# Patient Record
Sex: FEMALE | Race: WHITE | NOT HISPANIC OR LATINO | Employment: PART TIME | URBAN - METROPOLITAN AREA
[De-identification: names, ages, dates, MRNs, and addresses within clinical notes are randomized per-mention and may not be internally consistent; named-entity substitution may affect disease eponyms.]

---

## 2018-03-06 ENCOUNTER — TELEPHONE (OUTPATIENT)
Dept: FAMILY MEDICINE | Facility: CLINIC | Age: 48
End: 2018-03-06

## 2018-03-06 RX ORDER — MONTELUKAST SODIUM 10 MG/1
5 TABLET ORAL NIGHTLY
COMMUNITY
End: 2018-04-02 | Stop reason: SDUPTHER

## 2018-03-06 RX ORDER — LEVOTHYROXINE SODIUM 25 UG/1
TABLET ORAL
COMMUNITY
Start: 2017-10-20 | End: 2019-04-04

## 2018-03-06 RX ORDER — MONTELUKAST SODIUM 10 MG/1
10 TABLET ORAL NIGHTLY
Qty: 30 TABLET | Refills: 0 | Status: SHIPPED | OUTPATIENT
Start: 2018-03-06 | End: 2018-04-02 | Stop reason: SDUPTHER

## 2018-03-06 RX ORDER — ZOLPIDEM TARTRATE 5 MG/1
5 TABLET ORAL
COMMUNITY
Start: 2017-07-14 | End: 2018-04-11 | Stop reason: SDUPTHER

## 2018-03-06 RX ORDER — MONTELUKAST SODIUM 10 MG/1
10 TABLET ORAL
COMMUNITY
Start: 2017-08-07 | End: 2018-03-06 | Stop reason: SDUPTHER

## 2018-03-06 RX ORDER — AZELASTINE HCL 205.5 UG/1
SPRAY NASAL
COMMUNITY
Start: 2017-07-26 | End: 2018-10-12 | Stop reason: SDUPTHER

## 2018-03-06 RX ORDER — BUPROPION HYDROCHLORIDE 150 MG/1
150 TABLET ORAL
COMMUNITY
Start: 2017-12-26 | End: 2018-03-26 | Stop reason: SDUPTHER

## 2018-03-06 RX ORDER — PAROXETINE HYDROCHLORIDE 40 MG/1
40 TABLET, FILM COATED ORAL
COMMUNITY
Start: 2017-12-06 | End: 2018-03-26 | Stop reason: SDUPTHER

## 2018-03-26 RX ORDER — BUPROPION HYDROCHLORIDE 150 MG/1
TABLET ORAL
Qty: 30 TABLET | Refills: 3 | Status: SHIPPED | OUTPATIENT
Start: 2018-03-26 | End: 2018-07-21 | Stop reason: SDUPTHER

## 2018-03-26 RX ORDER — PAROXETINE HYDROCHLORIDE 40 MG/1
TABLET, FILM COATED ORAL
Qty: 30 TABLET | Refills: 3 | Status: SHIPPED | OUTPATIENT
Start: 2018-03-26 | End: 2018-07-21 | Stop reason: SDUPTHER

## 2018-03-28 ENCOUNTER — TELEPHONE (OUTPATIENT)
Dept: FAMILY MEDICINE | Facility: CLINIC | Age: 48
End: 2018-03-28

## 2018-03-28 NOTE — TELEPHONE ENCOUNTER
Pt calling for refill of her Zolpidem 5mg. Pt can be reached at: [phone]. Pt would like [facility]

## 2018-04-02 RX ORDER — MONTELUKAST SODIUM 10 MG/1
TABLET ORAL
Qty: 30 TABLET | Refills: 0 | Status: SHIPPED | OUTPATIENT
Start: 2018-04-02 | End: 2018-09-01

## 2018-04-11 RX ORDER — ZOLPIDEM TARTRATE 5 MG/1
5 TABLET ORAL NIGHTLY
Qty: 30 TABLET | Refills: 3 | Status: SHIPPED | OUTPATIENT
Start: 2018-04-11 | End: 2018-10-30 | Stop reason: SDUPTHER

## 2018-04-11 NOTE — TELEPHONE ENCOUNTER
Dr Beavers, [facility] shows that this script was filled 2/16/18 for #30 w/ 3 rf, but confirmed with pharmacy that they do not show receiving this script.  Pt last filled 11/2016, can we please resend the refill. Pt has been seen, last seen by you on 5/25/17 ty

## 2018-07-11 ENCOUNTER — TELEPHONE (OUTPATIENT)
Dept: FAMILY MEDICINE | Facility: CLINIC | Age: 48
End: 2018-07-11

## 2018-07-11 NOTE — TELEPHONE ENCOUNTER
Pt LVM, returning Phone call about pharm, Pt needs refill of medication for zolpidem (AMBIEN) 5 mg tablet, [facility] to [facility]

## 2018-07-12 NOTE — TELEPHONE ENCOUNTER
Last refill 4/11 for 30# with 3 refills  Pt should have a refill at her pharmacy  LM asking [facility] to contact [facility]

## 2018-08-03 ENCOUNTER — OFFICE VISIT (OUTPATIENT)
Dept: FAMILY MEDICINE | Facility: CLINIC | Age: 48
End: 2018-08-03
Payer: COMMERCIAL

## 2018-08-03 VITALS
TEMPERATURE: 99.1 F | WEIGHT: 186 LBS | SYSTOLIC BLOOD PRESSURE: 124 MMHG | HEIGHT: 63 IN | HEART RATE: 87 BPM | OXYGEN SATURATION: 98 % | BODY MASS INDEX: 32.96 KG/M2 | DIASTOLIC BLOOD PRESSURE: 84 MMHG

## 2018-08-03 DIAGNOSIS — Z09 HOSPITAL DISCHARGE FOLLOW-UP: Primary | ICD-10-CM

## 2018-08-03 PROCEDURE — 99213 OFFICE O/P EST LOW 20 MIN: CPT | Performed by: NURSE PRACTITIONER

## 2018-08-03 RX ORDER — SUMATRIPTAN SUCCINATE 25 MG/1
25 TABLET ORAL ONCE AS NEEDED
Qty: 9 TABLET | Refills: 0 | Status: SHIPPED | OUTPATIENT
Start: 2018-08-03 | End: 2020-09-04

## 2018-08-03 RX ORDER — CLINDAMYCIN HYDROCHLORIDE 150 MG/1
CAPSULE ORAL
Refills: 0 | COMMUNITY
Start: 2018-06-18 | End: 2019-04-04

## 2018-08-03 RX ORDER — CHLORHEXIDINE GLUCONATE ORAL RINSE 1.2 MG/ML
SOLUTION DENTAL
Refills: 0 | COMMUNITY
Start: 2018-06-18 | End: 2019-04-04

## 2018-08-03 RX ORDER — LEVOTHYROXINE SODIUM 75 UG/1
TABLET ORAL
Refills: 3 | COMMUNITY
Start: 2018-07-21

## 2018-08-03 RX ORDER — OXYCODONE AND ACETAMINOPHEN 5; 325 MG/1; MG/1
TABLET ORAL
Refills: 0 | COMMUNITY
Start: 2018-06-25 | End: 2019-04-04

## 2018-08-03 RX ORDER — IBUPROFEN 600 MG/1
TABLET ORAL
Refills: 0 | COMMUNITY
Start: 2018-06-18 | End: 2019-04-04

## 2018-08-03 ASSESSMENT — ENCOUNTER SYMPTOMS
HEADACHES: 1
FEVER: 1
WHEEZING: 0
CHILLS: 1
COUGH: 0
VOMITING: 0
FATIGUE: 1
NAUSEA: 0
DIARRHEA: 0
CONSTIPATION: 0
SHORTNESS OF BREATH: 0
ABDOMINAL PAIN: 0

## 2018-08-03 ASSESSMENT — PAIN SCALES - GENERAL: PAINLEVEL: 0-NO PAIN

## 2018-08-03 NOTE — PATIENT INSTRUCTIONS
New onset migraine 5d ago, seen at [facility] ER and released. CT negative per papers from the Er. Trial sumatriptan PRN migraine, refer to Dr Xavier.     If Migraine returns, take 1 sumatriptan tablet. If symptoms not resolved in 2 hours, you can take a second tablet. If symptoms still not resolved 2 hours after the second dose, go to the ER for evaluation.     If you have any weakness, difficulty speaking, blurred vision, go to the ER right away for evaluation.

## 2018-08-03 NOTE — LETTER
August 3, 2018     Patient: Annette Montes De Oca   YOB: 1970   Date of Visit: 8/3/2018       To Whom it May Concern:    Annette Montes De Oca was seen in my clinic on 8/3/2018 at 10:20 am. Please excuse Annette NEIL for her absence from work on this day to make the appointment. She may return to work on Wednesday, 8/8/18.    If you have any questions or concerns, please don't hesitate to call.         Sincerely,         CLINTON Delaney        CC: No Recipients

## 2018-08-03 NOTE — PROGRESS NOTES
[facility]  [address] [place] [id number]  [phone]     Reason for visit:   Chief Complaint   Patient presents with   • Post Op Hospital Visit     For migraines- seen at [facility]. [place] disorganized Saturday and [place] started Sunday with multiple vomiting.       HPI   Annette Montes De Oca is a 48 y.o. female who presents with hospital follow up       No past medical history on file.  No past surgical history on file.  Social History     Social History Narrative   • No narrative on file     No family history on file.  Cortisone and Penicillins  Current Outpatient Prescriptions   Medication Sig Dispense Refill   • azelastine 0.15 % (205.5 mcg) nasal spray spray 1 spray by intranasal route 2 times every day in each nostril     • buPROPion XL (WELLBUTRIN XL) 150 mg 24 hr tablet TAKE 1 TABLET BY MOUTH DAILY 30 tablet 1   • ibuprofen (MOTRIN) 600 mg tablet TAKE 1 TABLET BY MOUTH EVERY 4 TO 6 HOURS AS NEEDED FOR PAIN  0   • insulin regular (NovoLIN R) 100 unit/mL injection inject by sliding scale with meals (according to carbohydrate scale)     • levothyroxine (SYNTHROID) 75 mcg tablet 1 TABLET ON AN EMPTY STOMACH IN THE MORNING  3   • montelukast (SINGULAIR) 10 mg tablet TAKE 1 TABLET BY MOUTH NIGHTLY 30 tablet 0   • norgestrel-ethinyl estradiol (CRYSELLE, 28,) 0.3-30 mg-mcg per tablet take 1 tablet by oral route  every day     • oxyCODONE-acetaminophen (PERCOCET) 5-325 mg per tablet TAKE 1 TABLET BY MOUTH EVERY 4 TO 6 HOURS AS NEEDED FOR PAIN  0   • PARoxetine (PAXIL) 40 mg tablet TAKE 1 TABLET BY MOUTH DAILY 30 tablet 1   • zolpidem (AMBIEN) 5 mg tablet Take 1 tablet (5 mg total) by mouth nightly. 30 tablet 3   • chlorhexidine (PERIDEX) 0.12 % solution SWISH FOR 30 SECONDS 1/2 OUNCE TWICE A DAY DO NOT RINSE,DRINK OR EAT FOR 1/2 HOUR  0   • clindamycin (CLEOCIN) 150 mg capsule TAKE ONE CAPSULE BY MOUTH 4 TIMES A DAY UNTIL FINISHED  0   • levothyroxine (SYNTHROID) 25 mcg tablet  take 1 tablet by oral route  every day       No current facility-administered medications for this visit.        Review of Systems   Constitutional: Positive for chills (occasional), fatigue and fever (low grade).   Respiratory: Negative for cough, shortness of breath and wheezing.    Cardiovascular: Negative for chest pain.   Gastrointestinal: Negative for abdominal pain, constipation, diarrhea, nausea and vomiting.   Neurological: Positive for headaches (complex migraine 5d ago).       Objective     Vitals:    08/03/18 1029   BP: 124/84   Pulse: 87   Temp: 37.3 °C (99.1 °F)   SpO2: 98%       Physical Exam   Constitutional: She is oriented to person, place, and time. Vital signs are normal. She appears well-developed and well-nourished.   HENT:   Head: Normocephalic and atraumatic.   Cardiovascular: Normal rate, regular rhythm and normal heart sounds.    Pulmonary/Chest: Effort normal and breath sounds normal.   Neurological: She is alert and oriented to person, place, and time.   Psychiatric: She has a normal mood and affect. Her speech is normal and behavior is normal. Judgment and thought content normal. Cognition and memory are normal.       Recent labs before today:     Lab Results   Component Value Date    WBC 5.6 10/06/2017    HGB 14.3 10/06/2017    HCT 41.9 10/06/2017     (H) 10/06/2017    ALT 11 10/06/2017    AST 10 10/06/2017     10/06/2017    K 4.2 10/06/2017    CL 99 10/06/2017    CREATININE 0.71 10/06/2017    BUN 9 10/06/2017    CO2 23 10/06/2017    TSH 4.920 (H) 10/06/2017    HGBA1C 8.0 (H) 10/06/2017        Procedures   Assessment   [unfilled]  Problem List Items Addressed This Visit     None              CLINTON Delaney  8/3/2018

## 2018-08-29 ENCOUNTER — TELEPHONE (OUTPATIENT)
Dept: FAMILY MEDICINE | Facility: CLINIC | Age: 48
End: 2018-08-29

## 2018-08-29 NOTE — TELEPHONE ENCOUNTER
Pt is scheduled with Dr Shivam greene for DM check up  Pt is a complicated type 1 diabetic and typically sees endo  Per Dr Beavers she will see the pt tonight at 6:40 if she is not seeing nilton, or Friday afternoon per Dr Beavers   Tried to reach pt at both number and LM askingher to call..  If she does not call back will attempt again jc to schedule for friday

## 2018-08-30 NOTE — TELEPHONE ENCOUNTER
Do not see that this pt called back last night after our attempts to reach.  I tried to reach her again today, but had to again leave a detailed message that Dr Beavers her PCP would like to see her jc and we would like to change her appt from Today with Dr Langley to an appt time for jc w/ Dr Beavers, my direct line as well as the  number left for the pt.

## 2018-08-30 NOTE — TELEPHONE ENCOUNTER
I tried to call patient again , stated Nimesh Marc seen she was schd with Shivam today and she would rather see her I have an appt with Nimesh Friday open for her , left message for her to return my call

## 2018-08-31 ENCOUNTER — OFFICE VISIT (OUTPATIENT)
Dept: FAMILY MEDICINE | Facility: CLINIC | Age: 48
End: 2018-08-31
Payer: COMMERCIAL

## 2018-08-31 VITALS
HEART RATE: 94 BPM | DIASTOLIC BLOOD PRESSURE: 60 MMHG | RESPIRATION RATE: 14 BRPM | TEMPERATURE: 99.1 F | HEIGHT: 64 IN | BODY MASS INDEX: 31.76 KG/M2 | SYSTOLIC BLOOD PRESSURE: 128 MMHG | WEIGHT: 186 LBS | OXYGEN SATURATION: 97 %

## 2018-08-31 DIAGNOSIS — L02.211 ABDOMINAL WALL ABSCESS: Primary | ICD-10-CM

## 2018-08-31 DIAGNOSIS — E10.628 TYPE 1 DIABETES MELLITUS WITH OTHER SKIN COMPLICATION: ICD-10-CM

## 2018-08-31 PROCEDURE — 99213 OFFICE O/P EST LOW 20 MIN: CPT | Mod: 25 | Performed by: FAMILY MEDICINE

## 2018-08-31 PROCEDURE — 10061 I&D ABSCESS COMP/MULTIPLE: CPT | Performed by: FAMILY MEDICINE

## 2018-08-31 RX ORDER — CLINDAMYCIN HYDROCHLORIDE 300 MG/1
300 CAPSULE ORAL 3 TIMES DAILY
Qty: 30 CAPSULE | Refills: 0 | Status: SHIPPED | OUTPATIENT
Start: 2018-08-31 | End: 2018-09-07 | Stop reason: SDUPTHER

## 2018-08-31 RX ORDER — CLINDAMYCIN HYDROCHLORIDE 300 MG/1
300 CAPSULE ORAL 3 TIMES DAILY
Qty: 30 CAPSULE | Refills: 0 | Status: SHIPPED | OUTPATIENT
Start: 2018-08-31 | End: 2018-08-31 | Stop reason: SDUPTHER

## 2018-09-01 ASSESSMENT — ENCOUNTER SYMPTOMS
CHEST TIGHTNESS: 0
ACTIVITY CHANGE: 0
FATIGUE: 0
APPETITE CHANGE: 0
ABDOMINAL DISTENTION: 0
FEVER: 1
POLYPHAGIA: 0
BRUISES/BLEEDS EASILY: 0
DIAPHORESIS: 0
DYSURIA: 0

## 2018-09-02 NOTE — PROGRESS NOTES
Subjective      Patient ID: Annette Montes De Oca is a 48 y.o. female.      HPI    The following have been reviewed and updated as appropriate in this visit:     Pt with abscess left lower abd - started few days ago     Likely where she injects insulin     Diabetic with fair control  BG in 200 range   Have not been abnormally out of control for  She does have followup with endo in  A few weeks     She thinks she maybe fever but not sure       Review of Systems   Constitutional: Positive for fever. Negative for activity change, appetite change, diaphoresis and fatigue.   Eyes: Negative for visual disturbance.   Respiratory: Negative for chest tightness.    Cardiovascular: Negative for chest pain.   Gastrointestinal: Negative for abdominal distention.   Endocrine: Negative for polyphagia and polyuria.   Genitourinary: Negative for dysuria.   Hematological: Does not bruise/bleed easily.         Current Outpatient Prescriptions:   •  azelastine 0.15 % (205.5 mcg) nasal spray, spray 1 spray by intranasal route 2 times every day in each nostril, Disp: , Rfl:   •  buPROPion XL (WELLBUTRIN XL) 150 mg 24 hr tablet, TAKE 1 TABLET BY MOUTH DAILY, Disp: 30 tablet, Rfl: 1  •  chlorhexidine (PERIDEX) 0.12 % solution, SWISH FOR 30 SECONDS 1/2 OUNCE TWICE A DAY DO NOT RINSE,DRINK OR EAT FOR 1/2 HOUR, Disp: , Rfl: 0  •  ibuprofen (MOTRIN) 600 mg tablet, TAKE 1 TABLET BY MOUTH EVERY 4 TO 6 HOURS AS NEEDED FOR PAIN, Disp: , Rfl: 0  •  insulin regular (NovoLIN R) 100 unit/mL injection, inject by sliding scale with meals (according to carbohydrate scale), Disp: , Rfl:   •  levothyroxine (SYNTHROID) 25 mcg tablet, take 1 tablet by oral route  every day, Disp: , Rfl:   •  levothyroxine (SYNTHROID) 75 mcg tablet, 1 TABLET ON AN EMPTY STOMACH IN THE MORNING, Disp: , Rfl: 3  •  norgestrel-ethinyl estradiol (CRYSELLE, [place],) 0.3-30 mg-mcg per tablet, take 1 tablet by oral route  every day, Disp: , Rfl:   •  PARoxetine (PAXIL) 40 mg tablet, TAKE 1  "TABLET BY MOUTH DAILY, Disp: 30 tablet, Rfl: 1  •  SUMAtriptan (IMITREX) 25 mg tablet, Take 1 tablet (25 mg total) by mouth once as needed for migraine for up to 1 dose. May repeat dose once in 2 hours if no relief., Disp: 9 tablet, Rfl: 0  •  zolpidem (AMBIEN) 5 mg tablet, Take 1 tablet (5 mg total) by mouth nightly., Disp: 30 tablet, Rfl: 3  •  clindamycin (CLEOCIN HCL) 300 mg capsule, Take 1 capsule (300 mg total) by mouth 3 (three) times a day for 10 days., Disp: 30 capsule, Rfl: 0  •  clindamycin (CLEOCIN) 150 mg capsule, TAKE ONE CAPSULE BY MOUTH 4 TIMES A DAY UNTIL FINISHED, Disp: , Rfl: 0  •  oxyCODONE-acetaminophen (PERCOCET) 5-325 mg per tablet, TAKE 1 TABLET BY MOUTH EVERY 4 TO 6 HOURS AS NEEDED FOR PAIN, Disp: , Rfl: 0    History reviewed. No pertinent past medical history.  Objective     Vitals:    08/31/18 1512   BP: 128/60   BP Location: Right upper arm   Patient Position: Sitting   Pulse: 94   Resp: 14   Temp: 37.3 °C (99.1 °F)   TempSrc: Oral   SpO2: 97%   Weight: 84.4 kg (186 lb)   Height: 1.626 m (5' 4\")     Body mass index is 31.93 kg/m².    Physical Exam   Constitutional: She appears well-developed and well-nourished.   HENT:   Head: Normocephalic and atraumatic.   Neck: Neck supple.   Cardiovascular: Normal rate, regular rhythm, normal heart sounds and intact distal pulses.    Pulmonary/Chest: Effort normal and breath sounds normal.   Abdominal: Soft. Bowel sounds are normal. There is no tenderness. There is no guarding.   Musculoskeletal: She exhibits no edema or deformity.   Skin:   abd wall left lower abd -  Palp abcess with surrounding erythema        Psychiatric: She has a normal mood and affect. Her behavior is normal. Judgment and thought content normal.     Procedures   Incision and Drainage Procedure Note  Diagnosis: abscess  Location: abdomen  Informed Consent: Discussed risks (permanent scarring, light or dark discoloration, infection, pain, bleeding, bruising, redness, damage to " adjacent structures, and recurrence of the lesion) and benefits of the procedure, as well as the alternatives.  Informed consent was obtained.  Preparation: The area was prepared and draped in a standard fashion.  Anesthesia: Lidocaine 2% without epinephrine  Procedure Details: An incision was made overlying the lesion. The lesion drained pus.   A large amount of fluid was drained.     Antibiotic ointment and a sterile pressure dressing were applied. The patient tolerated procedure well.  Total number of lesions drained: 1  Plan: The patient was instructed on post-op care. Recommend OTC analgesia as needed for pain.               Assessment/Plan   Problem List Items Addressed This Visit        Other    Type 1 diabetes mellitus (CMS/[facility]) ([facility])      Other Visit Diagnoses     Abdominal wall abscess    -  Primary    Relevant Orders    Bacterial culture / smear, aerobic and anaerobic      abscess - incised released copious discharge   Wound packed with small amt of of packing with tail     Disc this is a severe infection and may require IV abx   rx clindamycin dis starting immediately with abx take now and another dose in 4-6 hrs before bed tonight  Disc if increasing redness or drainage or fevers she will need to go to ER immediately    Disc monitoring BG closely and cover   Maintain hydration   Disc on Sunday if decreased redness can remove packing - discussed how   followu with me on tuesday

## 2018-09-02 NOTE — PROCEDURES
Procedures   Incision and Drainage Procedure Note  Diagnosis: abscess  Location: abdomen  Informed Consent: Discussed risks (permanent scarring, light or dark discoloration, infection, pain, bleeding, bruising, redness, damage to adjacent structures, and recurrence of the lesion) and benefits of the procedure, as well as the alternatives.  Informed consent was obtained.  Preparation: The area was prepared and draped in a standard fashion.  Anesthesia: Lidocaine 2% without epinephrine  Procedure Details: An incision was made overlying the lesion. The lesion drained pus.   A large amount of fluid was drained.     Antibiotic ointment and a sterile pressure dressing were applied. The patient tolerated procedure well.  Total number of lesions drained: 1  Plan: The patient was instructed on post-op care. Recommend OTC analgesia as needed for pain.

## 2018-09-04 ENCOUNTER — OFFICE VISIT (OUTPATIENT)
Dept: FAMILY MEDICINE | Facility: CLINIC | Age: 48
End: 2018-09-04
Payer: COMMERCIAL

## 2018-09-04 VITALS
TEMPERATURE: 98.4 F | HEIGHT: 64 IN | BODY MASS INDEX: 31.65 KG/M2 | SYSTOLIC BLOOD PRESSURE: 118 MMHG | DIASTOLIC BLOOD PRESSURE: 60 MMHG | OXYGEN SATURATION: 96 % | WEIGHT: 185.4 LBS | RESPIRATION RATE: 14 BRPM | HEART RATE: 91 BPM

## 2018-09-04 DIAGNOSIS — L02.211 ABDOMINAL WALL ABSCESS: ICD-10-CM

## 2018-09-04 DIAGNOSIS — E10.628 TYPE 1 DIABETES MELLITUS WITH OTHER SKIN COMPLICATION: Primary | ICD-10-CM

## 2018-09-04 LAB
BACTERIA SPEC ANAEROBE CULT: ABNORMAL
BACTERIA SPEC CULT: ABNORMAL

## 2018-09-04 PROCEDURE — 99024 POSTOP FOLLOW-UP VISIT: CPT | Performed by: FAMILY MEDICINE

## 2018-09-04 NOTE — PROGRESS NOTES
Subjective      Patient ID: Annette Montes De Oca is a 48 y.o. female.      HPI    The following have been reviewed and updated as appropriate in this visit:          PT with abd wall abcess with I and D - Done 4 days  Ago (Friday)  packing was removed yesterday     She is on day 4 of clindamycin ; still open with some discharge minimal     She did have fevers  Saturday and maybe Sunday but no fever past 2 days ; tolerating clinda without side effects.     She has hx of type 1 DM - has followup with endo set up for next month     BG  occas high was 350 this am  but used ISS  And has come down to < 200     Sept 18th has followup of DM      Review of Systems      Current Outpatient Prescriptions:   •  azelastine 0.15 % (205.5 mcg) nasal spray, spray 1 spray by intranasal route 2 times every day in each nostril, Disp: , Rfl:   •  buPROPion XL (WELLBUTRIN XL) 150 mg 24 hr tablet, TAKE 1 TABLET BY MOUTH DAILY, Disp: 30 tablet, Rfl: 1  •  chlorhexidine (PERIDEX) 0.12 % solution, SWISH FOR 30 SECONDS 1/2 OUNCE TWICE A DAY DO NOT RINSE,DRINK OR EAT FOR 1/2 HOUR, Disp: , Rfl: 0  •  clindamycin (CLEOCIN HCL) 300 mg capsule, Take 1 capsule (300 mg total) by mouth 3 (three) times a day for 10 days., Disp: 30 capsule, Rfl: 0  •  clindamycin (CLEOCIN) 150 mg capsule, TAKE ONE CAPSULE BY MOUTH 4 TIMES A DAY UNTIL FINISHED, Disp: , Rfl: 0  •  ibuprofen (MOTRIN) 600 mg tablet, TAKE 1 TABLET BY MOUTH EVERY 4 TO 6 HOURS AS NEEDED FOR PAIN, Disp: , Rfl: 0  •  insulin regular (NovoLIN R) 100 unit/mL injection, inject by sliding scale with meals (according to carbohydrate scale), Disp: , Rfl:   •  levothyroxine (SYNTHROID) 25 mcg tablet, take 1 tablet by oral route  every day, Disp: , Rfl:   •  levothyroxine (SYNTHROID) 75 mcg tablet, 1 TABLET ON AN EMPTY STOMACH IN THE MORNING, Disp: , Rfl: 3  •  norgestrel-ethinyl estradiol (CRYSELLE, [place],) 0.3-30 mg-mcg per tablet, take 1 tablet by oral route  every day, Disp: , Rfl:   •   "oxyCODONE-acetaminophen (PERCOCET) 5-325 mg per tablet, TAKE 1 TABLET BY MOUTH EVERY 4 TO 6 HOURS AS NEEDED FOR PAIN, Disp: , Rfl: 0  •  PARoxetine (PAXIL) 40 mg tablet, TAKE 1 TABLET BY MOUTH DAILY, Disp: 30 tablet, Rfl: 1  •  SUMAtriptan (IMITREX) 25 mg tablet, Take 1 tablet (25 mg total) by mouth once as needed for migraine for up to 1 dose. May repeat dose once in 2 hours if no relief., Disp: 9 tablet, Rfl: 0  •  zolpidem (AMBIEN) 5 mg tablet, Take 1 tablet (5 mg total) by mouth nightly., Disp: 30 tablet, Rfl: 3    History reviewed. No pertinent past medical history.  Objective     Vitals:    09/04/18 1040   BP: 118/60   BP Location: Right upper arm   Patient Position: Sitting   Pulse: 91   Resp: 14   Temp: 36.9 °C (98.4 °F)   TempSrc: Oral   SpO2: 96%   Weight: 84.1 kg (185 lb 6.4 oz)   Height: 1.626 m (5' 4\")     Body mass index is 31.82 kg/m².    Physical Exam   Constitutional: She appears well-developed and well-nourished.   HENT:   Head: Normocephalic and atraumatic.   Eyes: Conjunctivae and EOM are normal.   Neck: Neck supple.   Cardiovascular: Normal rate, regular rhythm and normal heart sounds.    Pulmonary/Chest: Effort normal.   Abdominal: Soft. Bowel sounds are normal. She exhibits no distension and no mass. There is no tenderness. There is no guarding.   Skin:   abd wall abcess lower left abd wall   I and D site still open some discharge   Not able to express significant discharge       Assessment/Plan   Problem List Items Addressed This Visit        Other    Type 1 diabetes mellitus (CMS/HCC) (HCC) - Primary    Abdominal wall abscess      continue clindamycin   Warm compresses moist   Monitor closely if increase in redness or discharge or pain or fever call or go to ER     Monitor BG closely  And adjust with ISS   followup in 3 day    "

## 2018-09-06 ENCOUNTER — TELEPHONE (OUTPATIENT)
Dept: FAMILY MEDICINE | Facility: CLINIC | Age: 48
End: 2018-09-06

## 2018-09-06 NOTE — TELEPHONE ENCOUNTER
----- Message from Ruba Beavers MD sent at 9/5/2018 12:56 PM EDT -----  Please call pt   Culture results back finally from abd wall abckt showed bacteria is sensitive to clindamycin that she has been prescribed     Continue to take abx  dressing changes twice a day     See me as scheduled on Friday or call if worsening  eg  fever more drainage more redness more fullness  in mean time

## 2018-09-07 ENCOUNTER — OFFICE VISIT (OUTPATIENT)
Dept: FAMILY MEDICINE | Facility: CLINIC | Age: 48
End: 2018-09-07
Payer: COMMERCIAL

## 2018-09-07 VITALS
WEIGHT: 184.2 LBS | RESPIRATION RATE: 14 BRPM | HEIGHT: 64 IN | HEART RATE: 95 BPM | SYSTOLIC BLOOD PRESSURE: 120 MMHG | DIASTOLIC BLOOD PRESSURE: 60 MMHG | OXYGEN SATURATION: 99 % | BODY MASS INDEX: 31.45 KG/M2 | TEMPERATURE: 98.5 F

## 2018-09-07 DIAGNOSIS — L02.211 ABDOMINAL WALL ABSCESS: Primary | ICD-10-CM

## 2018-09-07 PROCEDURE — 99024 POSTOP FOLLOW-UP VISIT: CPT | Performed by: FAMILY MEDICINE

## 2018-09-07 RX ORDER — CLINDAMYCIN HYDROCHLORIDE 300 MG/1
300 CAPSULE ORAL 3 TIMES DAILY
Qty: 9 CAPSULE | Refills: 0 | Status: SHIPPED | OUTPATIENT
Start: 2018-09-07 | End: 2019-04-04

## 2018-09-07 NOTE — PROGRESS NOTES
Subjective      Patient ID: Annette Montes De Oca is a 48 y.o. female.      HPI    The following have been reviewed and updated as appropriate in this visit:     improving   No fever   Less drainage   Tolerating clinda -  No diarrhea no GI upset     BG are better 100-200 range      Review of Systems      Current Outpatient Prescriptions:   •  azelastine 0.15 % (205.5 mcg) nasal spray, spray 1 spray by intranasal route 2 times every day in each nostril, Disp: , Rfl:   •  buPROPion XL (WELLBUTRIN XL) 150 mg 24 hr tablet, TAKE 1 TABLET BY MOUTH DAILY, Disp: 30 tablet, Rfl: 1  •  chlorhexidine (PERIDEX) 0.12 % solution, SWISH FOR 30 SECONDS 1/2 OUNCE TWICE A DAY DO NOT RINSE,DRINK OR EAT FOR 1/2 HOUR, Disp: , Rfl: 0  •  clindamycin (CLEOCIN HCL) 300 mg capsule, Take 1 capsule (300 mg total) by mouth 3 (three) times a day., Disp: 9 capsule, Rfl: 0  •  clindamycin (CLEOCIN) 150 mg capsule, TAKE ONE CAPSULE BY MOUTH 4 TIMES A DAY UNTIL FINISHED, Disp: , Rfl: 0  •  ibuprofen (MOTRIN) 600 mg tablet, TAKE 1 TABLET BY MOUTH EVERY 4 TO 6 HOURS AS NEEDED FOR PAIN, Disp: , Rfl: 0  •  insulin regular (NovoLIN R) 100 unit/mL injection, inject by sliding scale with meals (according to carbohydrate scale), Disp: , Rfl:   •  levothyroxine (SYNTHROID) 25 mcg tablet, take 1 tablet by oral route  every day, Disp: , Rfl:   •  levothyroxine (SYNTHROID) 75 mcg tablet, 1 TABLET ON AN EMPTY STOMACH IN THE MORNING, Disp: , Rfl: 3  •  norgestrel-ethinyl estradiol (CRYSELLE, 28,) 0.3-30 mg-mcg per tablet, take 1 tablet by oral route  every day, Disp: , Rfl:   •  oxyCODONE-acetaminophen (PERCOCET) 5-325 mg per tablet, TAKE 1 TABLET BY MOUTH EVERY 4 TO 6 HOURS AS NEEDED FOR PAIN, Disp: , Rfl: 0  •  PARoxetine (PAXIL) 40 mg tablet, TAKE 1 TABLET BY MOUTH DAILY, Disp: 30 tablet, Rfl: 1  •  SUMAtriptan (IMITREX) 25 mg tablet, Take 1 tablet (25 mg total) by mouth once as needed for migraine for up to 1 dose. May repeat dose once in 2 hours if no relief.,  "Disp: 9 tablet, Rfl: 0  •  zolpidem (AMBIEN) 5 mg tablet, Take 1 tablet (5 mg total) by mouth nightly., Disp: 30 tablet, Rfl: 3    History reviewed. No pertinent past medical history.  Objective     Vitals:    09/07/18 1107   BP: 120/60   BP Location: Right upper arm   Patient Position: Sitting   Pulse: 95   Resp: 14   Temp: 36.9 °C (98.5 °F)   TempSrc: Oral   SpO2: 99%   Weight: 83.6 kg (184 lb 3.2 oz)   Height: 1.626 m (5' 4\")     Body mass index is 31.62 kg/m².    Physical Exam   Constitutional: She is oriented to person, place, and time. She appears well-developed and well-nourished.   HENT:   Head: Normocephalic and atraumatic.   Abdominal: Soft. Bowel sounds are normal. She exhibits no distension.   Neurological: She is alert and oriented to person, place, and time.   Skin:   Left lower abd wall   Less erythema   Less palp abcess   No purulent drainage          Assessment/Plan   Problem List Items Addressed This Visit        Other    Abdominal wall abscess - Primary      resolving abx x 4 more days   Call of not resolving or if increased area of redness or discharge go to er     "

## 2018-09-17 RX ORDER — MONTELUKAST SODIUM 10 MG/1
TABLET ORAL
Qty: 30 TABLET | Refills: 4 | OUTPATIENT
Start: 2018-09-17

## 2018-09-17 NOTE — TELEPHONE ENCOUNTER
Can you please look into this med request? I don't see Singulair in the patients med list. Perhaps it didn't transfer in the conversion. Thank you

## 2018-10-12 ENCOUNTER — TELEPHONE (OUTPATIENT)
Dept: FAMILY MEDICINE | Facility: CLINIC | Age: 48
End: 2018-10-12

## 2018-10-12 RX ORDER — AZELASTINE HCL 205.5 UG/1
SPRAY NASAL
Qty: 30 ML | Refills: 0 | Status: SHIPPED | OUTPATIENT
Start: 2018-10-12 | End: 2019-05-26 | Stop reason: SDUPTHER

## 2018-10-14 RX ORDER — AZELASTINE 1 MG/ML
SPRAY, METERED NASAL
Qty: 30 ML | Refills: 1 | OUTPATIENT
Start: 2018-10-14

## 2018-10-23 ENCOUNTER — OFFICE VISIT (OUTPATIENT)
Dept: FAMILY MEDICINE | Facility: CLINIC | Age: 48
End: 2018-10-23
Payer: COMMERCIAL

## 2018-10-23 DIAGNOSIS — Z23 IMMUNIZATION DUE: Primary | ICD-10-CM

## 2018-10-23 PROCEDURE — 200200 PR NO CHARGE: Performed by: NURSE PRACTITIONER

## 2018-10-23 PROCEDURE — 90471 IMMUNIZATION ADMIN: CPT | Performed by: NURSE PRACTITIONER

## 2018-10-23 PROCEDURE — 90686 IIV4 VACC NO PRSV 0.5 ML IM: CPT | Performed by: NURSE PRACTITIONER

## 2018-10-30 RX ORDER — ZOLPIDEM TARTRATE 5 MG/1
TABLET ORAL
Qty: 30 TABLET | Refills: 0 | Status: SHIPPED | OUTPATIENT
Start: 2018-10-30 | End: 2018-11-30 | Stop reason: SDUPTHER

## 2018-11-30 ENCOUNTER — TELEPHONE (OUTPATIENT)
Dept: FAMILY MEDICINE | Facility: CLINIC | Age: 48
End: 2018-11-30

## 2018-11-30 RX ORDER — PAROXETINE HYDROCHLORIDE 40 MG/1
TABLET, FILM COATED ORAL
Qty: 30 TABLET | Refills: 1 | Status: SHIPPED | OUTPATIENT
Start: 2018-11-30 | End: 2019-01-29 | Stop reason: SDUPTHER

## 2018-11-30 RX ORDER — ZOLPIDEM TARTRATE 5 MG/1
5 TABLET ORAL NIGHTLY PRN
Qty: 30 TABLET | Refills: 3 | Status: SHIPPED | OUTPATIENT
Start: 2018-11-30 | End: 2019-05-14 | Stop reason: SDUPTHER

## 2018-11-30 RX ORDER — ZOLPIDEM TARTRATE 5 MG/1
TABLET ORAL
Qty: 30 TABLET | Refills: 0 | Status: SHIPPED | OUTPATIENT
Start: 2018-11-30 | End: 2018-11-30 | Stop reason: SDUPTHER

## 2018-12-04 ENCOUNTER — PATIENT OUTREACH (OUTPATIENT)
Dept: FAMILY MEDICINE | Facility: CLINIC | Age: 48
End: 2018-12-04

## 2018-12-04 NOTE — PROGRESS NOTES
Care Gap Team has outreached to Annette Montes De Oca on behalf of their primary care provider.      Care Gap Source:: IPPIP Gap Report         Care Gap Status:: Due    Outreach via:: Telephone    Adult Preventive Wellness Protocol(s) Used: : HbA1c Completion, Diabetic Nephropathy Screening    Chart Review Completed:: Yes  Patient interview completed:: No  Inclusion Criteria:: Met  Exclusion Criteria: None  Patient educated on recommended care:: Yes                        I left a message requesting that patient return my call at her earliest convenience to further discuss past due testing.

## 2019-01-02 ENCOUNTER — OFFICE VISIT (OUTPATIENT)
Dept: FAMILY MEDICINE | Facility: CLINIC | Age: 49
End: 2019-01-02
Payer: COMMERCIAL

## 2019-01-02 ENCOUNTER — TELEPHONE (OUTPATIENT)
Dept: FAMILY MEDICINE | Facility: CLINIC | Age: 49
End: 2019-01-02

## 2019-01-02 VITALS
HEIGHT: 63 IN | DIASTOLIC BLOOD PRESSURE: 70 MMHG | HEART RATE: 85 BPM | OXYGEN SATURATION: 98 % | BODY MASS INDEX: 33.06 KG/M2 | WEIGHT: 186.6 LBS | TEMPERATURE: 99.4 F | SYSTOLIC BLOOD PRESSURE: 122 MMHG

## 2019-01-02 DIAGNOSIS — L03.032 PARONYCHIA OF TOE OF LEFT FOOT: Primary | ICD-10-CM

## 2019-01-02 PROCEDURE — 99213 OFFICE O/P EST LOW 20 MIN: CPT | Performed by: FAMILY MEDICINE

## 2019-01-02 RX ORDER — CEPHALEXIN 500 MG/1
500 CAPSULE ORAL 4 TIMES DAILY
Qty: 28 CAPSULE | Refills: 1 | Status: SHIPPED | OUTPATIENT
Start: 2019-01-02 | End: 2019-04-04

## 2019-01-02 NOTE — TELEPHONE ENCOUNTER
Pt's penicillin allergy is GI upset, informed Ritesh at [facility] of this and they will proceed with RF. Pt already informed pharm of this as well.

## 2019-01-02 NOTE — ASSESSMENT & PLAN NOTE
Discussed and reviewed her allergy history: She has GI intolerance to penicillin drugs but no true allergy, denies known allergy to cephalosporins.  Will initiate oral Keflex times 7 days, cautious use of warm foot baths and Epson salt and water, being very careful not to make the water too hot.  Follow-up with podiatrist in 7 days if any problems.    We discussed the expected course of losing that nail over the next 8 weeks: She is instructed to keep it trimmed back, cover with a Band-Aid as needed to avoid irritation of raised nail.

## 2019-01-02 NOTE — TELEPHONE ENCOUNTER
Ritesh from [facility] calling asking if its ok to fill cephalexin due to pcn allergy

## 2019-01-02 NOTE — PATIENT INSTRUCTIONS
Keflex x 7 to 14 days  (call if any GI side effects).    Stay out of work till Saturday or Monday.    Paronychia of toe of left foot  Discussed and reviewed her allergy history: She has GI intolerance to penicillin drugs but no true allergy, denies known allergy to cephalosporins.  Will initiate oral Keflex times 7 days, cautious use of warm foot baths and Epson salt and water, being very careful not to make the water too hot.  Follow-up with podiatrist in 7 days if any problems.    We discussed the expected course of losing that nail over the next 8 weeks: She is instructed to keep it trimmed back, cover with a Band-Aid as needed to avoid irritation of raised nail.

## 2019-01-02 NOTE — PROGRESS NOTES
"Subjective  After wearing a new pair of poorly fitted narrow shoes patient developed partial separation of her left great toenail along with redness swelling and sign of infection with some drainage at the base of the nail which is .    She has a 40-year history of type 1 diabetes but no neuropathy today.  Most recent A1c was above target at 8.0 according to patient.She denies fever chills lymphangitic streaking.  She has been using topical Neosporin for the past few days.  It is uncomfortable to stand on her feet at work at present.     Patient ID: Annette Montes De Oca is a 48 y.o. female.  1970      HPI    The following have been reviewed and updated as appropriate in this visit:  Allergies  Meds  Problems       Review of Systems    Objective     Vitals:    01/02/19 0956   BP: 122/70   BP Location: Right upper arm   Patient Position: Sitting   Pulse: 85   Temp: 37.4 °C (99.4 °F)   SpO2: 98%   Weight: 84.6 kg (186 lb 9.6 oz)   Height: 1.6 m (5' 3\")     Body mass index is 33.05 kg/m².    Physical Exam   Skin:   Left great toe exhibits early paronychia but no fluctuance at medial aspect of the cuticle, the nail has started to separate but remains beneath the cuticle.  No lymphangitic streaking but mild tenderness at the base of nail.    Sensory exam of bilateral feet shows intact vibratory and filament testing.       Assessment/Plan   Problem List Items Addressed This Visit     Paronychia of toe of left foot - Primary     Discussed and reviewed her allergy history: She has GI intolerance to penicillin drugs but no true allergy, denies known allergy to cephalosporins.  Will initiate oral Keflex times 7 days, cautious use of warm foot baths and Epson salt and water, being very careful not to make the water too hot.  Follow-up with podiatrist in 7 days if any problems.    We discussed the expected course of losing that nail over the next 8 weeks: She is instructed to keep it trimmed back, cover with a " Band-Aid as needed to avoid irritation of raised nail.         Relevant Medications    cephalexin (KEFLEX) 500 mg capsule

## 2019-01-02 NOTE — LETTER
January 2, 2019     Patient: Annette Montes De Oca   YOB: 1970   Date of Visit: 1/2/2019       To Whom it May Concern:    Annette Montes De Oca was seen in my clinic on 1/2/2019 at 10:00 am. Please excuse Annette NEIL for her absence from work until Saturday (at the soonest) or Monday if not improved due to  Foot infection treated today    If you have any questions or concerns, please don't hesitate to call.         Sincerely,         Ephraim Patel MD        CC: No Recipients

## 2019-01-11 ENCOUNTER — PATIENT OUTREACH (OUTPATIENT)
Dept: FAMILY MEDICINE | Facility: CLINIC | Age: 49
End: 2019-01-11

## 2019-01-11 NOTE — PROGRESS NOTES
Care Gap Team has outreached to Annette Montes De Oca on behalf of their primary care provider.      Care Gap Source:: IPPIP Gap Report         Care Gap Status:: Due    Outreach via:: Telephone    Adult Preventive Wellness Protocol(s) Used: : HbA1c Completion, Diabetic Nephropathy Screening    Chart Review Completed:: Yes  Patient interview completed:: No  Inclusion Criteria:: Met  Exclusion Criteria: None  Patient educated on recommended care:: Yes       I left a message requesting that patient return my call at her earliest convenience to further discuss past due HbA1c testng and Nephropathy screening.

## 2019-01-29 ENCOUNTER — PATIENT OUTREACH (OUTPATIENT)
Dept: FAMILY MEDICINE | Facility: CLINIC | Age: 49
End: 2019-01-29

## 2019-01-29 NOTE — PROGRESS NOTES
Care Gap Team has outreached to Annette Montes De Oca on behalf of their primary care provider.      Care Gap Source:: [employer] CareBook Report         Care Gap Status:: Due    Outreach via:: Telephone    Adult Preventive Wellness Protocol(s) Used: : HbA1c Completion, Diabetic Nephropathy Screening    Chart Review Completed:: Yes  Patient interview completed:: Yes  Inclusion Criteria:: Met  Exclusion Criteria: None  Patient educated on recommended care:: Yes                 Appointment provided:: No                Addressed patient's questions and concerns.  Patient verbalized an understanding of the plan and is without additional questions or concerns at this time. Patient has lab slips from her endocrinologist and will have them completed soon. She has an appt scheduled for 3/6/19 and her endo is Dr. Vega.

## 2019-03-05 RX ORDER — PAROXETINE HYDROCHLORIDE 40 MG/1
TABLET, FILM COATED ORAL
Qty: 30 TABLET | Refills: 0 | Status: SHIPPED | OUTPATIENT
Start: 2019-03-05 | End: 2019-04-02 | Stop reason: SDUPTHER

## 2019-04-04 ENCOUNTER — OFFICE VISIT (OUTPATIENT)
Dept: FAMILY MEDICINE | Facility: CLINIC | Age: 49
End: 2019-04-04
Payer: COMMERCIAL

## 2019-04-04 VITALS
HEART RATE: 87 BPM | WEIGHT: 185.8 LBS | OXYGEN SATURATION: 99 % | BODY MASS INDEX: 32.92 KG/M2 | HEIGHT: 63 IN | TEMPERATURE: 98.7 F | SYSTOLIC BLOOD PRESSURE: 130 MMHG | RESPIRATION RATE: 20 BRPM | DIASTOLIC BLOOD PRESSURE: 70 MMHG

## 2019-04-04 DIAGNOSIS — R35.0 FREQUENCY OF URINATION: ICD-10-CM

## 2019-04-04 DIAGNOSIS — E10.628 TYPE 1 DIABETES MELLITUS WITH OTHER SKIN COMPLICATION: ICD-10-CM

## 2019-04-04 DIAGNOSIS — N30.00 ACUTE CYSTITIS WITHOUT HEMATURIA: Primary | ICD-10-CM

## 2019-04-04 LAB
BILIRUBIN, POC: NEGATIVE
BLOOD URINE, POC: ABNORMAL
CLARITY, POC: CLEAR
COLOR, POC: YELLOW
EXPIRATION DATE: ABNORMAL
GLUCOSE URINE, POC: NEGATIVE
KETONES, POC: NEGATIVE
LEUKOCYTE EST, POC: ABNORMAL
Lab: ABNORMAL
NITRITE, POC: NEGATIVE
PH, POC: 5
POCT MANUFACTURER: ABNORMAL
PROTEIN, POC: ABNORMAL
SPECIFIC GRAVITY, POC: 1.02
UROBILINOGEN, POC: 0.2

## 2019-04-04 PROCEDURE — 99214 OFFICE O/P EST MOD 30 MIN: CPT | Performed by: FAMILY MEDICINE

## 2019-04-04 PROCEDURE — 81002 URINALYSIS NONAUTO W/O SCOPE: CPT | Performed by: FAMILY MEDICINE

## 2019-04-04 RX ORDER — SULFAMETHOXAZOLE AND TRIMETHOPRIM 800; 160 MG/1; MG/1
1 TABLET ORAL 2 TIMES DAILY
Qty: 20 TABLET | Refills: 0 | Status: SHIPPED | OUTPATIENT
Start: 2019-04-04 | End: 2019-07-19

## 2019-04-04 NOTE — PROGRESS NOTES
Chief Complaint  Chief Complaint   Patient presents with   • Abdominal Pain     left side pain   • UTI       History of Present Illness  Has been under a lot of work and financial stress. Blood sugars have been high and have been variable.   This AM started to notice urine frequency and urgency and a little bladder pressure more to the left and midline in lower pelvis and feels like her UTI in the past and BS higher yet.  No fever, back pain or GI complaints.          Current Outpatient Prescriptions   Medication Sig Dispense Refill   • azelastine 0.15 % (205.5 mcg) nasal spray spray 1 spray by intranasal route 2 times every day in each nostril 30 mL 0   • buPROPion XL (WELLBUTRIN XL) 150 mg 24 hr tablet TAKE 1 TABLET BY MOUTH EVERY DAY 30 tablet 5   • insulin regular (NovoLIN R) 100 unit/mL injection inject by sliding scale with meals (according to carbohydrate scale)     • levothyroxine (SYNTHROID) 75 mcg tablet 1 TABLET ON AN EMPTY STOMACH IN THE MORNING  3   • norgestrel-ethinyl estradiol (CRYSELLE, 28,) 0.3-30 mg-mcg per tablet take 1 tablet by oral route  every day     • PARoxetine (PAXIL) 40 mg tablet TAKE 1 TABLET BY MOUTH EVERY DAY 30 tablet 0   • SUMAtriptan (IMITREX) 25 mg tablet Take 1 tablet (25 mg total) by mouth once as needed for migraine for up to 1 dose. May repeat dose once in 2 hours if no relief. 9 tablet 0   • zolpidem (AMBIEN) 5 mg tablet Take 1 tablet (5 mg total) by mouth nightly as needed for sleep. 30 tablet 3     No current facility-administered medications for this visit.        Patient Active Problem List   Diagnosis   • Anxiety   • Hypothyroid   • Iron deficiency anemia   • Type 1 diabetes mellitus (CMS/HCC) (HCC)   • Abdominal wall abscess   • Paronychia of toe of left foot       Past Medical History:   Diagnosis Date   • Diabetes mellitus type I (CMS/HCC) (HCC)    • Disease of thyroid gland        Past Surgical History:   Procedure Laterality Date   • CARPAL TUNNEL RELEASE     • FOOT  "SURGERY     • HERNIA REPAIR     • MOUTH SURGERY     • OOPHORECTOMY     • SINUS SURGERY     • WISDOM TOOTH EXTRACTION         Family History   Problem Relation Age of Onset   • Heart disease Maternal Grandmother        Social History     Social History   • Marital status:      Spouse name: N/A   • Number of children: N/A   • Years of education: N/A     Occupational History   • Not on file.     Social History Main Topics   • Smoking status: Former Smoker   • Smokeless tobacco: Never Used   • Alcohol use No   • Drug use: No   • Sexual activity: Defer     Other Topics Concern   • Not on file     Social History Narrative    [employer]       Allergies   Allergen Reactions   • Cortisone      inrease in blood sugars, mood changes   • Penicillins GI intolerance       Review of Systems   Constitutional:        As in HPI       Vitals:    04/04/19 0903   BP: 130/70   BP Location: Left upper arm   Patient Position: Sitting   Pulse: 87   Resp: 20   Temp: 37.1 °C (98.7 °F)   TempSrc: Oral   SpO2: 99%   Weight: 84.3 kg (185 lb 12.8 oz)   Height: 1.6 m (5' 3\")     Body mass index is 32.91 kg/m².    Physical Exam   Constitutional: She is oriented to person, place, and time. She appears well-developed and well-nourished.   Eyes: Conjunctivae are normal.   Cardiovascular: Normal rate and regular rhythm.    Pulmonary/Chest: Effort normal and breath sounds normal.   Abdominal: Soft. She exhibits no mass. There is tenderness (Suprapubic and just left of the suprapubic area). There is no rebound and no guarding.   Neurological: She is alert and oriented to person, place, and time.   Skin: No rash noted.   Psychiatric: She has a normal mood and affect. Her behavior is normal.   Nursing note and vitals reviewed.      Problem List Items Addressed This Visit     Type 1 diabetes mellitus (CMS/HCC) ([facility])      Other Visit Diagnoses     Acute cystitis without hematuria    -  Primary    Urine dip with trace leukocytes and blood, start " antibiotic and counseled. ER if worsening left sided pain or fever as atypical diverticulitis a possibility.    Relevant Medications    sulfamethoxazole-trimethoprim (BACTRIM DS) 800-160 mg per tablet    Other Relevant Orders    Urine culture    Urinalysis with microscopic    Frequency of urination        Await culture results and aware to proceed to ER if not responding.  Frequency was disproportionate and urgency disproportionate to when she has high blood suga    Relevant Orders    POCT urinalysis dipstick (Completed)          Return if symptoms worsen or fail to improve.          Jessica Singh MD  [facility]  Family Medicine in [place]  [address],  [address]  [place]  [address]  P: [phone]  F: [phone]

## 2019-04-06 LAB
APPEARANCE UR: CLEAR
BACTERIA #/AREA URNS HPF: ABNORMAL /[HPF]
BILIRUB UR QL STRIP: NEGATIVE
COLOR UR: YELLOW
EPI CELLS #/AREA URNS HPF: ABNORMAL /HPF
GLUCOSE UR QL: NEGATIVE
HGB UR QL STRIP: NEGATIVE
KETONES UR QL STRIP: NEGATIVE
LEUKOCYTE ESTERASE UR QL STRIP: (no result)
MICRO URNS: (no result)
MUCOUS THREADS URNS QL MICRO: PRESENT
NITRITE UR QL STRIP: NEGATIVE
PH UR STRIP: 5.5 [PH] (ref 5–7.5)
PROT UR QL STRIP: NEGATIVE
RBC #/AREA URNS HPF: ABNORMAL /HPF
SP GR UR: 1.02 (ref 1–1.03)
UROBILINOGEN UR STRIP-MCNC: 1 EU/DL (ref 0.2–1)
WBC #/AREA URNS HPF: ABNORMAL /HPF

## 2019-04-07 LAB
BACTERIA UR CULT: NO GROWTH
BACTERIA UR CULT: NORMAL

## 2019-04-25 ENCOUNTER — PATIENT OUTREACH (OUTPATIENT)
Dept: FAMILY MEDICINE | Facility: CLINIC | Age: 49
End: 2019-04-25

## 2019-04-25 NOTE — PROGRESS NOTES
Care Gap Team has outreached to Annette Montes De Oca on behalf of their primary care provider.      Care Gap Source:: IPPIP Gap Report         Care Gap Status:: Due    Outreach via:: Telephone    Adult Preventive Wellness Protocol(s) Used: : HbA1c Completion, Diabetic Nephropathy Screening    Chart Review Completed:: Yes  Patient interview completed:: No  Inclusion Criteria:: Met  Exclusion Criteria: None  Patient educated on recommended care:: No       Provided order(s) for:: none    Provided clinical referral(s) for:: None    Appointment provided:: No        I left a message requesting that patient return my call at her earliest convenience. Per IPPIP Care Gap Report, patient is due for HbA1c and microalbumin screening.

## 2019-05-01 RX ORDER — PAROXETINE HYDROCHLORIDE 40 MG/1
TABLET, FILM COATED ORAL
Qty: 30 TABLET | Refills: 0 | Status: SHIPPED | OUTPATIENT
Start: 2019-05-01 | End: 2019-06-03 | Stop reason: SDUPTHER

## 2019-05-14 RX ORDER — ZOLPIDEM TARTRATE 5 MG/1
5 TABLET ORAL NIGHTLY PRN
Qty: 30 TABLET | Refills: 3 | Status: SHIPPED | OUTPATIENT
Start: 2019-05-14 | End: 2019-09-22 | Stop reason: SDUPTHER

## 2019-05-29 ENCOUNTER — TELEPHONE (OUTPATIENT)
Dept: FAMILY MEDICINE | Facility: CLINIC | Age: 49
End: 2019-05-29

## 2019-05-29 RX ORDER — MONTELUKAST SODIUM 10 MG/1
10 TABLET ORAL EVERY EVENING
Qty: 90 TABLET | Refills: 0 | Status: SHIPPED | OUTPATIENT
Start: 2019-05-29 | End: 2019-09-07 | Stop reason: SDUPTHER

## 2019-05-29 RX ORDER — MONTELUKAST SODIUM 10 MG/1
TABLET ORAL EVERY EVENING
Refills: 4 | COMMUNITY
Start: 2019-04-09 | End: 2019-05-29 | Stop reason: SDUPTHER

## 2019-05-29 RX ORDER — MONTELUKAST SODIUM 10 MG/1
TABLET ORAL EVERY EVENING
Refills: 4 | Status: CANCELLED | OUTPATIENT
Start: 2019-05-29

## 2019-05-29 NOTE — TELEPHONE ENCOUNTER
Medicine Refill Request    Last Office Visit: 4/4/2019  Next Office Visit: 7/15/2019        Current Outpatient Prescriptions:   •  azelastine 0.15 % (205.5 mcg) nasal spray, SPRAY 1 SPRAY IN EACH NOSTRIL 2 TIMES EVERY DAY, Disp: 30 mL, Rfl: 0  •  buPROPion XL (WELLBUTRIN XL) 150 mg 24 hr tablet, TAKE 1 TABLET BY MOUTH EVERY DAY, Disp: 30 tablet, Rfl: 1  •  insulin regular (NovoLIN R) 100 unit/mL injection, inject by sliding scale with meals (according to carbohydrate scale), Disp: , Rfl:   •  levothyroxine (SYNTHROID) 75 mcg tablet, 1 TABLET ON AN EMPTY STOMACH IN THE MORNING, Disp: , Rfl: 3  •  montelukast (SINGULAIR) 10 mg tablet, every evening., Disp: , Rfl: 4  •  norgestrel-ethinyl estradiol (CRYSELLE, 28,) 0.3-30 mg-mcg per tablet, take 1 tablet by oral route  every day, Disp: , Rfl:   •  PARoxetine (PAXIL) 40 mg tablet, TAKE 1 TABLET BY MOUTH EVERY DAY, Disp: 30 tablet, Rfl: 0  •  SUMAtriptan (IMITREX) 25 mg tablet, Take 1 tablet (25 mg total) by mouth once as needed for migraine for up to 1 dose. May repeat dose once in 2 hours if no relief., Disp: 9 tablet, Rfl: 0  •  zolpidem (AMBIEN) 5 mg tablet, Take 1 tablet (5 mg total) by mouth nightly as needed for sleep., Disp: 30 tablet, Rfl: 3      BP Readings from Last 3 Encounters:   04/04/19 130/70   01/02/19 122/70   09/07/18 120/60       Recent Lab results:  No results found for: CHOL, No results found for: HDL, No results found for: LDLCALC, No results found for: TRIG     Lab Results   Component Value Date    GLUCOSE 149 (H) 10/06/2017   ,   Lab Results   Component Value Date    HGBA1C 8.0 (H) 10/06/2017         Lab Results   Component Value Date    CREATININE 0.71 10/06/2017

## 2019-05-29 NOTE — TELEPHONE ENCOUNTER
Pt lvm, returning [facility] phone call about montelukast (SINGULAIR) 10 mg tablet, pt uses it every day,

## 2019-05-29 NOTE — TELEPHONE ENCOUNTER
Left voice mail message for patient to call back to clarify the medication.  At her OV on 8/31/18 she reports that she was no longer taking the medication.

## 2019-07-19 ENCOUNTER — OFFICE VISIT (OUTPATIENT)
Dept: FAMILY MEDICINE | Facility: CLINIC | Age: 49
End: 2019-07-19
Payer: COMMERCIAL

## 2019-07-19 ENCOUNTER — HOSPITAL ENCOUNTER (OUTPATIENT)
Dept: RADIOLOGY | Age: 49
Discharge: HOME | End: 2019-07-19
Attending: FAMILY MEDICINE
Payer: COMMERCIAL

## 2019-07-19 ENCOUNTER — TELEPHONE (OUTPATIENT)
Dept: FAMILY MEDICINE | Facility: CLINIC | Age: 49
End: 2019-07-19

## 2019-07-19 VITALS
RESPIRATION RATE: 16 BRPM | TEMPERATURE: 98.6 F | DIASTOLIC BLOOD PRESSURE: 70 MMHG | BODY MASS INDEX: 34.69 KG/M2 | SYSTOLIC BLOOD PRESSURE: 130 MMHG | HEIGHT: 63 IN | WEIGHT: 195.8 LBS | HEART RATE: 90 BPM | OXYGEN SATURATION: 99 %

## 2019-07-19 DIAGNOSIS — R10.32 ABDOMINAL PAIN, LEFT LOWER QUADRANT: ICD-10-CM

## 2019-07-19 DIAGNOSIS — R30.0 DYSURIA: ICD-10-CM

## 2019-07-19 DIAGNOSIS — E10.628 TYPE 1 DIABETES MELLITUS WITH OTHER SKIN COMPLICATION: ICD-10-CM

## 2019-07-19 DIAGNOSIS — R10.32 ABDOMINAL PAIN, LEFT LOWER QUADRANT: Primary | ICD-10-CM

## 2019-07-19 LAB
BILIRUBIN, POC: NEGATIVE
BLOOD URINE, POC: NEGATIVE
CLARITY, POC: CLEAR
COLOR, POC: YELLOW
GLUCOSE URINE, POC: ABNORMAL
KETONES, POC: NEGATIVE
LEUKOCYTE EST, POC: NEGATIVE
NITRITE, POC: NEGATIVE
PH, POC: 7
PROTEIN, POC: ABNORMAL
SPECIFIC GRAVITY, POC: 1
UROBILINOGEN, POC: 0.2

## 2019-07-19 PROCEDURE — 99214 OFFICE O/P EST MOD 30 MIN: CPT | Performed by: FAMILY MEDICINE

## 2019-07-19 PROCEDURE — 74176 CT ABD & PELVIS W/O CONTRAST: CPT

## 2019-07-19 PROCEDURE — 81002 URINALYSIS NONAUTO W/O SCOPE: CPT | Performed by: FAMILY MEDICINE

## 2019-07-19 RX ORDER — SULFAMETHOXAZOLE AND TRIMETHOPRIM 800; 160 MG/1; MG/1
1 TABLET ORAL 2 TIMES DAILY
Qty: 14 TABLET | Refills: 0 | Status: SHIPPED | OUTPATIENT
Start: 2019-07-19 | End: 2019-08-14

## 2019-07-19 NOTE — TELEPHONE ENCOUNTER
I call ed pt - disc CT results normal  No sign of diverticulitis mass or other infection     Disc will cover for uti with bactrim     If the abd pain persists calll    Do get endo labs and see endo      ( urine culture was sent out )

## 2019-07-19 NOTE — PROGRESS NOTES
Subjective      Patient ID: Annette Montes De Oca is a 49 y.o. female.      HPI    The following have been reviewed and updated as appropriate in this visit:          Pt has had 2 days mild paiin left side of lower abd with midl urinary sx    She had similar sx few most ago saw Dr Singh who rx abx for presumed dx of UTI and sx resolved   But she also did consider possible dx of  Diverticulitis    Pt has not had diverticulitis previously   No fever no chills no diarrhea no BM changes   No vaginal sx   No back pain no flank pain     Pain is left lower abd   No uri sx no focal neuro sx   No SOB no cherst pain   Nno GERD   No bleeding in stool or urine     UA in office + glucose but not blood   DM type 1 fair control   Has apt with endo  Dr Vega     Pain pelvis           Review of Systems      Current Outpatient Prescriptions:   •  azelastine 0.15 % (205.5 mcg) nasal spray, INSTILL 1 SPRAY IN EACH NOSTRIL 2 TIMES EVERY DAY, Disp: 30 mL, Rfl: 0  •  buPROPion XL (WELLBUTRIN XL) 150 mg 24 hr tablet, TAKE 1 TABLET BY MOUTH EVERY DAY, Disp: 30 tablet, Rfl: 0  •  insulin regular (NovoLIN R) 100 unit/mL injection, inject by sliding scale with meals (according to carbohydrate scale), Disp: , Rfl:   •  levothyroxine (SYNTHROID) 75 mcg tablet, 1 TABLET ON AN EMPTY STOMACH IN THE MORNING, Disp: , Rfl: 3  •  montelukast (SINGULAIR) 10 mg tablet, Take 1 tablet (10 mg total) by mouth every evening., Disp: 90 tablet, Rfl: 0  •  norgestrel-ethinyl estradiol (CRYSELLE, 28,) 0.3-30 mg-mcg per tablet, take 1 tablet by oral route  every day, Disp: , Rfl:   •  PARoxetine (PAXIL) 40 mg tablet, TAKE 1 TABLET BY MOUTH EVERY DAY, Disp: 30 tablet, Rfl: 1  •  SUMAtriptan (IMITREX) 25 mg tablet, Take 1 tablet (25 mg total) by mouth once as needed for migraine for up to 1 dose. May repeat dose once in 2 hours if no relief., Disp: 9 tablet, Rfl: 0  •  zolpidem (AMBIEN) 5 mg tablet, Take 1 tablet (5 mg total) by mouth nightly as needed for  "sleep., Disp: 30 tablet, Rfl: 3  •  sulfamethoxazole-trimethoprim (BACTRIM DS) 800-160 mg per tablet, Take 1 tablet by mouth 2 (two) times a day for 7 days., Disp: 14 tablet, Rfl: 0    Past Medical History:   Diagnosis Date   • Diabetes mellitus type I (CMS/HCC) (HCC)    • Disease of thyroid gland      Past Surgical History:   Procedure Laterality Date   • CARPAL TUNNEL RELEASE     • FOOT SURGERY     • HERNIA REPAIR     • MOUTH SURGERY     • OOPHORECTOMY     • SINUS SURGERY     • WISDOM TOOTH EXTRACTION       Objective     Vitals:    07/19/19 1110   BP: 130/70   BP Location: Right upper arm   Patient Position: Sitting   Pulse: 90   Resp: 16   Temp: 37 °C (98.6 °F)   TempSrc: Oral   SpO2: 99%   Weight: 88.8 kg (195 lb 12.8 oz)   Height: 1.6 m (5' 3\")     Body mass index is 34.68 kg/m².    Physical Exam   Constitutional: She is oriented to person, place, and time. She appears well-developed and well-nourished. No distress.   Comfortable appearing   HENT:   Head: Normocephalic and atraumatic.   Neck: Normal range of motion. Neck supple. No thyromegaly present.   Cardiovascular: Normal rate, regular rhythm and normal heart sounds.  Exam reveals no gallop and no friction rub.    No murmur heard.  Pulmonary/Chest: Effort normal and breath sounds normal. No respiratory distress. She has no wheezes. She has no rales.   Abdominal: Soft. Bowel sounds are normal. She exhibits no distension and no mass. There is tenderness. There is no rebound and no guarding.   Just mild tenderness LLQ  No rebound      Musculoskeletal: She exhibits no edema.   Lymphadenopathy:     She has no cervical adenopathy.   Neurological: She is alert and oriented to person, place, and time.   Skin: She is not diaphoretic.   Psychiatric: She has a normal mood and affect. Her behavior is normal. Judgment and thought content normal.   Vitals reviewed.      Assessment/Plan   Problem List Items Addressed This Visit        Nervous    Dysuria    Relevant " Orders    POCT urinalysis dipstick (Completed)    Urine culture (clean catch) (Completed)       Endocrine/Metabolic    Type 1 diabetes mellitus (CMS/HCC) (HCC)    Relevant Orders    Comprehensive metabolic panel    Hemoglobin A1c    Lipid panel    TSH w reflex FT4    Microalbumin/Creatinine Ur Random      Other Visit Diagnoses     Abdominal pain, left lower quadrant    -  Primary    Relevant Orders    CT ABDOMEN PELVIS WITHOUT IV CONTRAST (Completed)      suspected early diverticulitis but CT benign - no bowel wall thickening or inflammation also  Nl pancreas  Etc     After CT dw pt will cover for uti   Will send out urine for clx   May still be early diverticulitis   If sx worsen over weekend go to ER     In long run she will need colonoscopy

## 2019-07-20 LAB
BACTERIA UR CULT: NO GROWTH
BACTERIA UR CULT: NORMAL

## 2019-07-21 ENCOUNTER — TELEPHONE (OUTPATIENT)
Dept: FAMILY MEDICINE | Facility: CLINIC | Age: 49
End: 2019-07-21

## 2019-07-23 ENCOUNTER — TELEPHONE (OUTPATIENT)
Dept: FAMILY MEDICINE | Facility: CLINIC | Age: 49
End: 2019-07-23

## 2019-07-23 LAB
ALBUMIN SERPL-MCNC: 3.8 G/DL (ref 3.5–5.5)
ALBUMIN/CREAT UR: <3.4 MG/G CREAT (ref 0–30)
ALBUMIN/GLOB SERPL: 1.5 {RATIO} (ref 1.2–2.2)
ALP SERPL-CCNC: 120 IU/L (ref 39–117)
ALT SERPL-CCNC: 9 IU/L (ref 0–32)
AST SERPL-CCNC: 9 IU/L (ref 0–40)
BILIRUB SERPL-MCNC: 0.3 MG/DL (ref 0–1.2)
BUN SERPL-MCNC: 9 MG/DL (ref 6–24)
BUN/CREAT SERPL: 10 (ref 9–23)
CALCIUM SERPL-MCNC: 9.5 MG/DL (ref 8.7–10.2)
CHLORIDE SERPL-SCNC: 104 MMOL/L (ref 96–106)
CHOLEST SERPL-MCNC: 150 MG/DL (ref 100–199)
CO2 SERPL-SCNC: 20 MMOL/L (ref 20–29)
CREAT SERPL-MCNC: 0.89 MG/DL (ref 0.57–1)
CREAT UR-MCNC: 88.8 MG/DL
GLOBULIN SER CALC-MCNC: 2.6 G/DL (ref 1.5–4.5)
GLUCOSE SERPL-MCNC: 168 MG/DL (ref 65–99)
HBA1C MFR BLD: 8.3 % (ref 4.8–5.6)
HDLC SERPL-MCNC: 45 MG/DL
LAB CORP EGFR IF AFRICN AM: 87 ML/MIN/1.73
LAB CORP EGFR IF NONAFRICN AM: 76 ML/MIN/1.73
LDLC SERPL CALC-MCNC: 94 MG/DL (ref 0–99)
MICROALBUMIN UR-MCNC: <3 UG/ML
POTASSIUM SERPL-SCNC: 4.5 MMOL/L (ref 3.5–5.2)
PROT SERPL-MCNC: 6.4 G/DL (ref 6–8.5)
SODIUM SERPL-SCNC: 139 MMOL/L (ref 134–144)
T4 FREE SERPL-MCNC: 1.36 NG/DL (ref 0.82–1.77)
TRIGL SERPL-MCNC: 55 MG/DL (ref 0–149)
TSH SERPL DL<=0.005 MIU/L-ACNC: 2.83 UIU/ML (ref 0.45–4.5)
VLDLC SERPL CALC-MCNC: 11 MG/DL (ref 5–40)

## 2019-07-24 NOTE — TELEPHONE ENCOUNTER
Please call pt   See if she is feeling better from left abd pain ? early Diverticulitis vs uti    Let her know she needs to have a colonoscopy both for screening and also because of possible diverticulitis -  It should be at least 2 mos after current  Symptoms have resolved     I also want to have followup with her - I see she is scheduled with Hemal - please get her in with me within the next month  -  I will be seeing pts 8/21 or one of holds next week

## 2019-08-06 RX ORDER — BUPROPION HYDROCHLORIDE 150 MG/1
TABLET ORAL
Qty: 30 TABLET | Refills: 0 | Status: SHIPPED | OUTPATIENT
Start: 2019-08-06 | End: 2019-09-01 | Stop reason: SDUPTHER

## 2019-08-14 ENCOUNTER — OFFICE VISIT (OUTPATIENT)
Dept: FAMILY MEDICINE | Facility: CLINIC | Age: 49
End: 2019-08-14
Payer: COMMERCIAL

## 2019-08-14 VITALS
RESPIRATION RATE: 16 BRPM | SYSTOLIC BLOOD PRESSURE: 130 MMHG | OXYGEN SATURATION: 98 % | HEART RATE: 90 BPM | WEIGHT: 196.6 LBS | HEIGHT: 63 IN | BODY MASS INDEX: 34.84 KG/M2 | TEMPERATURE: 98.9 F | DIASTOLIC BLOOD PRESSURE: 70 MMHG

## 2019-08-14 DIAGNOSIS — E10.9 TYPE 1 DIABETES MELLITUS WITHOUT COMPLICATION (CMS/HCC): Primary | ICD-10-CM

## 2019-08-14 DIAGNOSIS — I25.84 CORONARY ATHEROSCLEROSIS DUE TO CALCIFIED CORONARY LESION OF NATIVE ARTERY: ICD-10-CM

## 2019-08-14 DIAGNOSIS — E03.9 ACQUIRED HYPOTHYROIDISM: ICD-10-CM

## 2019-08-14 DIAGNOSIS — Z12.11 COLON CANCER SCREENING: ICD-10-CM

## 2019-08-14 DIAGNOSIS — I25.10 CORONARY ATHEROSCLEROSIS DUE TO CALCIFIED CORONARY LESION OF NATIVE ARTERY: ICD-10-CM

## 2019-08-14 PROCEDURE — 99214 OFFICE O/P EST MOD 30 MIN: CPT | Performed by: FAMILY MEDICINE

## 2019-08-14 RX ORDER — ATORVASTATIN CALCIUM 10 MG/1
10 TABLET, FILM COATED ORAL DAILY
Qty: 30 TABLET | Refills: 6 | Status: SHIPPED | OUTPATIENT
Start: 2019-08-14 | End: 2020-03-04 | Stop reason: SDUPTHER

## 2019-09-23 RX ORDER — ZOLPIDEM TARTRATE 5 MG/1
5 TABLET ORAL NIGHTLY PRN
Qty: 30 TABLET | Refills: 0 | Status: SHIPPED | OUTPATIENT
Start: 2019-09-23 | End: 2019-10-23 | Stop reason: SDUPTHER

## 2019-10-24 RX ORDER — ZOLPIDEM TARTRATE 5 MG/1
TABLET ORAL
Qty: 30 TABLET | Refills: 0 | Status: SHIPPED | OUTPATIENT
Start: 2019-10-24 | End: 2019-11-29 | Stop reason: SDUPTHER

## 2019-10-31 ENCOUNTER — CLINICAL SUPPORT (OUTPATIENT)
Dept: FAMILY MEDICINE | Facility: CLINIC | Age: 49
End: 2019-10-31
Payer: COMMERCIAL

## 2019-10-31 DIAGNOSIS — Z23 NEED FOR IMMUNIZATION AGAINST INFLUENZA: Primary | ICD-10-CM

## 2019-10-31 PROCEDURE — 90471 IMMUNIZATION ADMIN: CPT | Performed by: FAMILY MEDICINE

## 2019-10-31 PROCEDURE — 90686 IIV4 VACC NO PRSV 0.5 ML IM: CPT | Performed by: FAMILY MEDICINE

## 2019-11-29 RX ORDER — ZOLPIDEM TARTRATE 5 MG/1
TABLET ORAL
Qty: 30 TABLET | Refills: 0 | Status: SHIPPED | OUTPATIENT
Start: 2019-11-29 | End: 2019-12-31 | Stop reason: SDUPTHER

## 2019-11-29 NOTE — TELEPHONE ENCOUNTER
Last r/f 10/24/19    ..  Medicine Refill Request    Last Office Visit: 8/14/2019  Next Office Visit: 12/17/2019        Current Outpatient Medications:   •  atorvastatin (LIPITOR) 10 mg tablet, Take 1 tablet (10 mg total) by mouth daily., Disp: 30 tablet, Rfl: 6  •  azelastine 0.15 % (205.5 mcg) nasal spray, INSTILL 1 SPRAY IN EACH NOSTRIL 2 TIMES EVERY DAY, Disp: 30 mL, Rfl: 0  •  buPROPion XL (WELLBUTRIN XL) 150 mg 24 hr tablet, TAKE 1 TABLET BY MOUTH EVERY DAY, Disp: 30 tablet, Rfl: 5  •  insulin regular (NovoLIN R) 100 unit/mL injection, inject by sliding scale with meals (according to carbohydrate scale), Disp: , Rfl:   •  levothyroxine (SYNTHROID) 75 mcg tablet, 1 TABLET ON AN EMPTY STOMACH IN THE MORNING, Disp: , Rfl: 3  •  montelukast (SINGULAIR) 10 mg tablet, TAKE 1 TABLET BY MOUTH EVERY DAY IN THE EVENING, Disp: 90 tablet, Rfl: 1  •  norgestrel-ethinyl estradiol (CRYSELLE, 28,) 0.3-30 mg-mcg per tablet, take 1 tablet by oral route  every day, Disp: , Rfl:   •  PARoxetine (PAXIL) 40 mg tablet, TAKE 1 TABLET BY MOUTH EVERY DAY, Disp: 30 tablet, Rfl: 3  •  SUMAtriptan (IMITREX) 25 mg tablet, Take 1 tablet (25 mg total) by mouth once as needed for migraine for up to 1 dose. May repeat dose once in 2 hours if no relief., Disp: 9 tablet, Rfl: 0  •  zolpidem (AMBIEN) 5 mg tablet, TAKE 1 TABLET BY MOUTH NIGHTLY AS NEEDED FOR SLEEP, Disp: 30 tablet, Rfl: 0      BP Readings from Last 3 Encounters:   08/14/19 130/70   07/19/19 130/70   04/04/19 130/70       Recent Lab results:  Lab Results   Component Value Date    CHOL 150 07/22/2019   ,   Lab Results   Component Value Date    HDL 45 07/22/2019   ,   Lab Results   Component Value Date    LDLCALC 94 07/22/2019   ,   Lab Results   Component Value Date    TRIG 55 07/22/2019        Lab Results   Component Value Date    GLUCOSE 168 (H) 07/22/2019   ,   Lab Results   Component Value Date    HGBA1C 8.3 (H) 07/22/2019         Lab Results   Component Value Date    CREATININE  0.89 07/22/2019       Lab Results   Component Value Date    TSH 2.830 07/22/2019

## 2019-12-11 ENCOUNTER — OFFICE VISIT (OUTPATIENT)
Dept: FAMILY MEDICINE | Facility: CLINIC | Age: 49
End: 2019-12-11
Payer: COMMERCIAL

## 2019-12-11 VITALS
DIASTOLIC BLOOD PRESSURE: 76 MMHG | OXYGEN SATURATION: 94 % | TEMPERATURE: 97.7 F | BODY MASS INDEX: 34.59 KG/M2 | HEART RATE: 94 BPM | SYSTOLIC BLOOD PRESSURE: 120 MMHG | HEIGHT: 63 IN | WEIGHT: 195.2 LBS | RESPIRATION RATE: 16 BRPM

## 2019-12-11 DIAGNOSIS — H66.002 NON-RECURRENT ACUTE SUPPURATIVE OTITIS MEDIA OF LEFT EAR WITHOUT SPONTANEOUS RUPTURE OF TYMPANIC MEMBRANE: Primary | ICD-10-CM

## 2019-12-11 PROCEDURE — 99213 OFFICE O/P EST LOW 20 MIN: CPT | Performed by: FAMILY MEDICINE

## 2019-12-11 RX ORDER — DOXYCYCLINE 100 MG/1
100 CAPSULE ORAL 2 TIMES DAILY
Qty: 14 CAPSULE | Refills: 0 | Status: SHIPPED | OUTPATIENT
Start: 2019-12-11 | End: 2020-03-04

## 2019-12-11 RX ORDER — HUMAN INSULIN 100 [IU]/ML
INJECTION, SUSPENSION SUBCUTANEOUS
Refills: 12 | COMMUNITY
Start: 2019-12-02 | End: 2020-09-04

## 2019-12-11 ASSESSMENT — ENCOUNTER SYMPTOMS
NECK PAIN: 0
SORE THROAT: 0
ABDOMINAL PAIN: 0
COUGH: 0
DIFFICULTY URINATING: 0
SHORTNESS OF BREATH: 0
VOMITING: 0
TROUBLE SWALLOWING: 0
HEADACHES: 0
LIGHT-HEADEDNESS: 0
PALPITATIONS: 0
ADENOPATHY: 0
WHEEZING: 0
WEAKNESS: 0
DIARRHEA: 0
UNEXPECTED WEIGHT CHANGE: 0
CONSTIPATION: 0
FEVER: 0
FATIGUE: 0
DIZZINESS: 0
ABDOMINAL DISTENTION: 0
BLOOD IN STOOL: 0
RHINORRHEA: 0
NAUSEA: 0

## 2019-12-11 NOTE — PATIENT INSTRUCTIONS
Patient Education     Otitis Media, Adult    Otitis media occurs when there is inflammation and fluid in the middle ear. Your middle ear is a part of the ear that contains bones for hearing as well as air that helps send sounds to your brain.  What are the causes?  This condition is caused by a blockage in the eustachian tube. This tube drains fluid from the ear to the back of the nose (nasopharynx). A blockage in this tube can be caused by an object or by swelling (edema) in the tube. Problems that can cause a blockage include:  · A cold or other upper respiratory infection.  · Allergies.  · An irritant, such as tobacco smoke.  · Enlarged adenoids. The adenoids are areas of soft tissue located high in the back of the throat, behind the nose and the roof of the mouth.  · A mass in the nasopharynx.  · Damage to the ear caused by pressure changes (barotrauma).  What are the signs or symptoms?  Symptoms of this condition include:  · Ear pain.  · A fever.  · Decreased hearing.  · A headache.  · Tiredness (lethargy).  · Fluid leaking from the ear.  · Ringing in the ear.  How is this diagnosed?  This condition is diagnosed with a physical exam. During the exam your health care provider will use an instrument called an otoscope to look into your ear and check for redness, swelling, and fluid. He or she will also ask about your symptoms.  Your health care provider may also order tests, such as:  · A test to check the movement of the eardrum (pneumatic otoscopy). This test is done by squeezing a small amount of air into the ear.  · A test that changes air pressure in the middle ear to check how well the eardrum moves and whether the eustachian tube is working (tympanogram).  How is this treated?  This condition usually goes away on its own within 3-5 days. But if the condition is caused by a bacteria infection and does not go away own its own, or keeps coming back, your health care provider may:  · Prescribe antibiotic  medicines to treat the infection.  · Prescribe or recommend medicines to control pain.  Follow these instructions at home:  · Take over-the-counter and prescription medicines only as told by your health care provider.  · If you were prescribed an antibiotic medicine, take it as told by your health care provider. Do not stop taking the antibiotic even if you start to feel better.  · Keep all follow-up visits as told by your health care provider. This is important.  Contact a health care provider if:  · You have bleeding from your nose.  · There is a lump on your neck.  · You are not getting better in 5 days.  · You feel worse instead of better.  Get help right away if:  · You have severe pain that is not controlled with medicine.  · You have swelling, redness, or pain around your ear.  · You have stiffness in your neck.  · A part of your face is paralyzed.  · The bone behind your ear (mastoid) is tender when you touch it.  · You develop a severe headache.  Summary  · Otitis media is redness, soreness, and swelling of the middle ear.  · This condition usually goes away on its own within 3-5 days.  · If the problem does not go away in 3-5 days, your health care provider may prescribe or recommend medicines to treat your symptoms.  · If you were prescribed an antibiotic medicine, take it as told by your health care provider.  This information is not intended to replace advice given to you by your health care provider. Make sure you discuss any questions you have with your health care provider.  Document Released: 09/22/2005 Document Revised: 12/08/2017 Document Reviewed: 12/08/2017  [employer] Patient Education © 2019 [employer]

## 2019-12-11 NOTE — PROGRESS NOTES
Daily Progress Note       Patient ID: Annette Montes De Oca is a 49 y.o. female.    49 year old female presents for left ear pain.     Earache / Otalgia    There is pain in the left ear. Episode onset: 10 days ago. The problem occurs constantly. The problem has been gradually worsening. There has been no fever. The pain is mild. Associated symptoms include hearing loss. Pertinent negatives include no abdominal pain, coughing, diarrhea, ear discharge, headaches, neck pain, rash, rhinorrhea, sore throat or vomiting. Treatments tried: Azelastine 1 spray/nostril twice daily.       The following have been reviewed and updated as appropriate in this visit:  Allergies  Meds  Problems       Review of Systems   Constitutional: Negative for fatigue, fever and unexpected weight change.   HENT: Positive for ear pain and hearing loss. Negative for ear discharge, rhinorrhea, sore throat and trouble swallowing.    Eyes: Negative for visual disturbance.   Respiratory: Negative for cough, shortness of breath and wheezing.    Cardiovascular: Negative for chest pain, palpitations and leg swelling.   Gastrointestinal: Negative for abdominal distention, abdominal pain, blood in stool, constipation, diarrhea, nausea and vomiting.   Endocrine: Negative for cold intolerance and heat intolerance.   Genitourinary: Negative for difficulty urinating.   Musculoskeletal: Negative for neck pain.   Skin: Negative for rash.   Neurological: Negative for dizziness, syncope, weakness, light-headedness and headaches.   Hematological: Negative for adenopathy.             Current Outpatient Medications   Medication Sig Dispense Refill   • atorvastatin (LIPITOR) 10 mg tablet Take 1 tablet (10 mg total) by mouth daily. 30 tablet 6   • azelastine 0.15 % (205.5 mcg) nasal spray INSTILL 1 SPRAY IN EACH NOSTRIL 2 TIMES EVERY DAY 30 mL 0   • buPROPion XL (WELLBUTRIN XL) 150 mg 24 hr tablet TAKE 1 TABLET BY MOUTH EVERY DAY 30 tablet 5   • insulin regular (NovoLIN  R) 100 unit/mL injection inject by sliding scale with meals (according to carbohydrate scale)     • levothyroxine (SYNTHROID) 75 mcg tablet 1 TABLET ON AN EMPTY STOMACH IN THE MORNING  3   • montelukast (SINGULAIR) 10 mg tablet TAKE 1 TABLET BY MOUTH EVERY DAY IN THE EVENING 90 tablet 1   • norgestrel-ethinyl estradiol (CRYSELLE, 28,) 0.3-30 mg-mcg per tablet take 1 tablet by oral route  every day     • NOVOLIN N NPH U-100 INSULIN 100 unit/mL injection INJECT 40 UNITS SUBCUTANEOUSLY AT BEDTIME AS DIRECTED  12   • PARoxetine (PAXIL) 40 mg tablet TAKE 1 TABLET BY MOUTH EVERY DAY 30 tablet 3   • SUMAtriptan (IMITREX) 25 mg tablet Take 1 tablet (25 mg total) by mouth once as needed for migraine for up to 1 dose. May repeat dose once in 2 hours if no relief. 9 tablet 0   • zolpidem (AMBIEN) 5 mg tablet TAKE 1 TABLET BY MOUTH EVERY DAY AT BEDTIME AS NEEDED FOR SLEEP 30 tablet 0   • doxycycline hyclate (VIBRAMYCIN) 100 mg capsule Take 1 capsule (100 mg total) by mouth 2 (two) times a day for 7 days. 14 capsule 0     No current facility-administered medications for this visit.      Past Medical History:   Diagnosis Date   • Diabetes mellitus type I (CMS/HCC)    • Disease of thyroid gland      Family History   Problem Relation Age of Onset   • Heart disease Maternal Grandmother      Past Surgical History:   Procedure Laterality Date   • CARPAL TUNNEL RELEASE     • FOOT SURGERY     • HERNIA REPAIR     • MOUTH SURGERY     • OOPHORECTOMY     • SINUS SURGERY     • WISDOM TOOTH EXTRACTION       Social History     Tobacco Use   • Smoking status: Former Smoker   • Smokeless tobacco: Never Used   Substance Use Topics   • Alcohol use: No   • Drug use: No     Allergies   Allergen Reactions   • Cortisone      inrease in blood sugars, mood changes   • Penicillins GI intolerance       Objective   No new labs.    Vitals:    12/11/19 1531   BP: 120/76   BP Location: Left upper arm   Patient Position: Sitting   Pulse: 94   Resp: 16   Temp:  "36.5 °C (97.7 °F)   TempSrc: Oral   SpO2: 94%   Weight: 88.5 kg (195 lb 3.2 oz)   Height: 1.6 m (5' 2.99\")         Physical Exam   Constitutional: She is oriented to person, place, and time. She appears well-developed and well-nourished.   HENT:   Head: Normocephalic and atraumatic.   Left Ear: Tympanic membrane is erythematous.   Eyes: Pupils are equal, round, and reactive to light. Conjunctivae and EOM are normal.   Neck: Normal range of motion. Neck supple.   Cardiovascular: Normal rate, regular rhythm and normal heart sounds.   Pulmonary/Chest: Effort normal and breath sounds normal. No respiratory distress.   Abdominal: Soft. Bowel sounds are normal. She exhibits no distension. There is no tenderness.   Musculoskeletal: Normal range of motion. She exhibits no edema.   Neurological: She is alert and oriented to person, place, and time.   Skin: Skin is warm and dry.   Nursing note and vitals reviewed.      Assessment/Plan   Problem List Items Addressed This Visit     None      Visit Diagnoses     Non-recurrent acute suppurative otitis media of left ear without spontaneous rupture of tympanic membrane    -  Primary    Relevant Medications    doxycycline hyclate (VIBRAMYCIN) 100 mg capsule      Will initiate abx at this time - started on doxycycline 100 mg twice daily x 7 days. May take NSAIDs or acetaminophen as needed for pain. Advised patient to call back if symptoms persists or worsens.     Chetan Rausch MD  12/11/2019  "

## 2020-01-06 RX ORDER — PAROXETINE HYDROCHLORIDE 40 MG/1
TABLET, FILM COATED ORAL
Qty: 30 TABLET | Refills: 0 | Status: SHIPPED | OUTPATIENT
Start: 2020-01-06 | End: 2020-02-29

## 2020-01-06 NOTE — TELEPHONE ENCOUNTER
Please call and schedule appt  Pt was due 12/14/19      ..  Medicine Refill Request    Last Office Visit: 12/11/2019  Next Office Visit: Visit date not found        Current Outpatient Medications:   •  atorvastatin (LIPITOR) 10 mg tablet, Take 1 tablet (10 mg total) by mouth daily., Disp: 30 tablet, Rfl: 6  •  azelastine 0.15 % (205.5 mcg) nasal spray, INSTILL 1 SPRAY IN EACH NOSTRIL 2 TIMES EVERY DAY, Disp: 30 mL, Rfl: 0  •  buPROPion XL (WELLBUTRIN XL) 150 mg 24 hr tablet, TAKE 1 TABLET BY MOUTH EVERY DAY, Disp: 30 tablet, Rfl: 5  •  insulin regular (NovoLIN R) 100 unit/mL injection, inject by sliding scale with meals (according to carbohydrate scale), Disp: , Rfl:   •  levothyroxine (SYNTHROID) 75 mcg tablet, 1 TABLET ON AN EMPTY STOMACH IN THE MORNING, Disp: , Rfl: 3  •  montelukast (SINGULAIR) 10 mg tablet, TAKE 1 TABLET BY MOUTH EVERY DAY IN THE EVENING, Disp: 90 tablet, Rfl: 1  •  norgestrel-ethinyl estradiol (CRYSELLE, 28,) 0.3-30 mg-mcg per tablet, take 1 tablet by oral route  every day, Disp: , Rfl:   •  NOVOLIN N NPH U-100 INSULIN 100 unit/mL injection, INJECT 40 UNITS SUBCUTANEOUSLY AT BEDTIME AS DIRECTED, Disp: , Rfl: 12  •  PARoxetine (PAXIL) 40 mg tablet, TAKE 1 TABLET BY MOUTH EVERY DAY, Disp: 30 tablet, Rfl: 3  •  SUMAtriptan (IMITREX) 25 mg tablet, Take 1 tablet (25 mg total) by mouth once as needed for migraine for up to 1 dose. May repeat dose once in 2 hours if no relief., Disp: 9 tablet, Rfl: 0  •  zolpidem (AMBIEN) 5 mg tablet, TAKE 1 TABLET BY MOUTH AT BEDTIME AS NEEDED FOR SLEEP, Disp: 30 tablet, Rfl: 1      BP Readings from Last 3 Encounters:   12/11/19 120/76   08/14/19 130/70   07/19/19 130/70       Recent Lab results:  Lab Results   Component Value Date    CHOL 150 07/22/2019   ,   Lab Results   Component Value Date    HDL 45 07/22/2019   ,   Lab Results   Component Value Date    LDLCALC 94 07/22/2019   ,   Lab Results   Component Value Date    TRIG 55 07/22/2019        Lab Results    Component Value Date    GLUCOSE 168 (H) 07/22/2019   ,   Lab Results   Component Value Date    HGBA1C 8.3 (H) 07/22/2019         Lab Results   Component Value Date    CREATININE 0.89 07/22/2019       Lab Results   Component Value Date    TSH 2.830 07/22/2019

## 2020-02-29 RX ORDER — PAROXETINE HYDROCHLORIDE 40 MG/1
TABLET, FILM COATED ORAL
Qty: 30 TABLET | Refills: 0 | Status: SHIPPED | OUTPATIENT
Start: 2020-02-29 | End: 2020-03-04 | Stop reason: SDUPTHER

## 2020-03-02 RX ORDER — ZOLPIDEM TARTRATE 5 MG/1
TABLET ORAL
Qty: 30 TABLET | Refills: 1 | Status: SHIPPED | OUTPATIENT
Start: 2020-03-02 | End: 2020-05-12 | Stop reason: SDUPTHER

## 2020-03-02 NOTE — TELEPHONE ENCOUNTER
Last OV 12/11/19 (SDS), 8/14/19 (med chk)    *Pt has return OV Wed 3/4/20    Last RF 1/1/20 #30 + 1RF

## 2020-03-04 ENCOUNTER — OFFICE VISIT (OUTPATIENT)
Dept: FAMILY MEDICINE | Facility: CLINIC | Age: 50
End: 2020-03-04
Payer: COMMERCIAL

## 2020-03-04 VITALS
RESPIRATION RATE: 16 BRPM | BODY MASS INDEX: 34.23 KG/M2 | TEMPERATURE: 98 F | WEIGHT: 193.2 LBS | SYSTOLIC BLOOD PRESSURE: 144 MMHG | HEART RATE: 101 BPM | OXYGEN SATURATION: 97 % | HEIGHT: 63 IN | DIASTOLIC BLOOD PRESSURE: 70 MMHG

## 2020-03-04 DIAGNOSIS — E03.9 ACQUIRED HYPOTHYROIDISM: ICD-10-CM

## 2020-03-04 DIAGNOSIS — E10.9 TYPE 1 DIABETES MELLITUS WITHOUT COMPLICATION (CMS/HCC): Primary | ICD-10-CM

## 2020-03-04 DIAGNOSIS — I25.10 CORONARY ATHEROSCLEROSIS DUE TO CALCIFIED CORONARY LESION OF NATIVE ARTERY: ICD-10-CM

## 2020-03-04 DIAGNOSIS — I25.84 CORONARY ATHEROSCLEROSIS DUE TO CALCIFIED CORONARY LESION OF NATIVE ARTERY: ICD-10-CM

## 2020-03-04 DIAGNOSIS — H65.92 LEFT OTITIS MEDIA WITH EFFUSION: ICD-10-CM

## 2020-03-04 PROCEDURE — 99214 OFFICE O/P EST MOD 30 MIN: CPT | Performed by: FAMILY MEDICINE

## 2020-03-04 RX ORDER — PAROXETINE HYDROCHLORIDE 40 MG/1
40 TABLET, FILM COATED ORAL
Qty: 30 TABLET | Refills: 5 | Status: SHIPPED | OUTPATIENT
Start: 2020-03-04 | End: 2020-10-21 | Stop reason: SDUPTHER

## 2020-03-04 RX ORDER — ATORVASTATIN CALCIUM 10 MG/1
10 TABLET, FILM COATED ORAL DAILY
Qty: 30 TABLET | Refills: 6 | Status: SHIPPED | OUTPATIENT
Start: 2020-03-04 | End: 2020-12-16 | Stop reason: SDUPTHER

## 2020-03-04 RX ORDER — BUPROPION HYDROCHLORIDE 150 MG/1
150 TABLET ORAL
Qty: 30 TABLET | Refills: 5 | Status: SHIPPED | OUTPATIENT
Start: 2020-03-04 | End: 2020-10-21 | Stop reason: SDUPTHER

## 2020-03-04 RX ORDER — DOXYCYCLINE HYCLATE 100 MG
100 TABLET ORAL 2 TIMES DAILY
Qty: 14 TABLET | Refills: 0 | Status: SHIPPED | OUTPATIENT
Start: 2020-03-04 | End: 2020-03-11

## 2020-03-04 NOTE — PROGRESS NOTES
Subjective      Patient ID: Annette Montes De Oca is a 50 y.o. female.      HPI    The following have been reviewed and updated as appropriate in this visit:  Allergies  Meds  Problems          Pt with hx of type 1 dm hypothyroidism and coronary atherosclerosis on CT started in lipitor last fall - due to recheck labs  - but does not have followup with endo apt until may    Endo  Last ov in august and not scheduled until may - Dr Vega at [facility] last month diabetic ok  Has haslasaer tx in past for retinopathy But dev cataracts     Mood is good     Left ear pressure - past week or two sinus congestion mild   No fever      Review of Systems      Current Outpatient Medications:   •  atorvastatin (LIPITOR) 10 mg tablet, Take 1 tablet (10 mg total) by mouth daily., Disp: 30 tablet, Rfl: 6  •  azelastine 0.15 % (205.5 mcg) nasal spray, INSTILL 1 SPRAY IN EACH NOSTRIL 2 TIMES EVERY DAY, Disp: 30 mL, Rfl: 0  •  buPROPion XL (WELLBUTRIN XL) 150 mg 24 hr tablet, Take 1 tablet (150 mg total) by mouth once daily., Disp: 30 tablet, Rfl: 5  •  insulin regular (NovoLIN R) 100 unit/mL injection, inject by sliding scale with meals (according to carbohydrate scale), Disp: , Rfl:   •  levothyroxine (SYNTHROID) 75 mcg tablet, 1 TABLET ON AN EMPTY STOMACH IN THE MORNING, Disp: , Rfl: 3  •  norgestrel-ethinyl estradiol (CRYSELLE, 28,) 0.3-30 mg-mcg per tablet, take 1 tablet by oral route  every day, Disp: , Rfl:   •  NOVOLIN N NPH U-100 INSULIN 100 unit/mL injection, INJECT 40 UNITS SUBCUTANEOUSLY AT BEDTIME AS DIRECTED, Disp: , Rfl: 12  •  PARoxetine (PAXIL) 40 mg tablet, Take 1 tablet (40 mg total) by mouth once daily., Disp: 30 tablet, Rfl: 5  •  SUMAtriptan (IMITREX) 25 mg tablet, Take 1 tablet (25 mg total) by mouth once as needed for migraine for up to 1 dose. May repeat dose once in 2 hours if no relief., Disp: 9 tablet, Rfl: 0  •  zolpidem (AMBIEN) 5 mg tablet, TAKE 1 TABLET BY MOUTH EVERY DAY AT BEDTIME AS NEEDED FOR  "SLEEP, Disp: 30 tablet, Rfl: 1  •  doxycycline hyclate (VIBRA-TABS) 100 mg tablet, Take 1 tablet (100 mg total) by mouth 2 (two) times a day for 7 days., Disp: 14 tablet, Rfl: 0    Past Medical History:   Diagnosis Date   • Diabetes mellitus type I (CMS/HCC)    • Disease of thyroid gland      Past Surgical History:   Procedure Laterality Date   • CARPAL TUNNEL RELEASE     • FOOT SURGERY     • HERNIA REPAIR     • MOUTH SURGERY     • OOPHORECTOMY     • SINUS SURGERY     • WISDOM TOOTH EXTRACTION       Objective     Vitals:    03/04/20 1440   BP: (!) 144/70   BP Location: Right upper arm   Patient Position: Sitting   Pulse: (!) 101   Resp: 16   Temp: 36.7 °C (98 °F)   TempSrc: Oral   SpO2: 97%   Weight: 87.6 kg (193 lb 3.2 oz)   Height: 1.6 m (5' 2.99\")      Body mass index is 34.23 kg/m².    Physical Exam   Constitutional: She is oriented to person, place, and time. She appears well-developed and well-nourished.   HENT:   Head: Normocephalic and atraumatic.   Right Ear: Tympanic membrane, external ear and ear canal normal.   Left Ear: External ear and ear canal normal. Tympanic membrane is injected, erythematous and retracted.   Neck: Normal range of motion. Neck supple. No thyromegaly present.   Cardiovascular: Normal rate, regular rhythm and normal heart sounds. Exam reveals no gallop and no friction rub.   No murmur heard.  Pulmonary/Chest: Effort normal and breath sounds normal. No respiratory distress. She has no wheezes. She has no rales.   Musculoskeletal: She exhibits deformity. She exhibits no edema.   Lymphadenopathy:     She has no cervical adenopathy.   Neurological: She is alert and oriented to person, place, and time.   Psychiatric: She has a normal mood and affect. Her behavior is normal. Judgment and thought content normal.       Assessment/Plan   Problem List Items Addressed This Visit        Endocrine/Metabolic    Hypothyroid    Relevant Orders    T4, free    TSH    Type 1 diabetes mellitus (CMS/HCC) " - Primary    Relevant Orders    Hemoglobin A1c    Comprehensive metabolic panel    Lipid panel    T4, free    TSH      Other Visit Diagnoses     Coronary atherosclerosis due to calcified coronary lesion of native artery        Relevant Medications    atorvastatin (LIPITOR) 10 mg tablet    Other Relevant Orders    Hemoglobin A1c    Comprehensive metabolic panel    Lipid panel    Left otitis media with effusion          disc doxy aom     Disc labs over due for dm followup   Disc consider freestyle amalia meter to get better data    There are no Patient Instructions on file for this visit.    Ruba Beavers MD

## 2020-03-13 ENCOUNTER — TELEPHONE (OUTPATIENT)
Dept: PRIMARY CARE | Facility: CLINIC | Age: 50
End: 2020-03-13

## 2020-03-13 NOTE — TELEPHONE ENCOUNTER
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 Pt is requesting a doctor's note to excuse her form work because of her increased risk to CO-VID.

## 2020-03-13 NOTE — TELEPHONE ENCOUNTER
Attempted to call patient 2 times, both times the phone hung up on me.  I was calling to tell her that a note is not necessary.  She is still able to work.  She just needs to follow good hand washing.

## 2020-03-15 NOTE — TELEPHONE ENCOUNTER
Please call patient and let her know that a letter to be out of work it not appropriate.  Please remind her of good hand washing.  TY

## 2020-03-18 NOTE — TELEPHONE ENCOUNTER
Medicine Refill Request    Last Office Visit: 3/4/2020  Next Office Visit: Visit date not found        Current Outpatient Medications:   •  atorvastatin (LIPITOR) 10 mg tablet, Take 1 tablet (10 mg total) by mouth daily., Disp: 30 tablet, Rfl: 6  •  azelastine 0.15 % (205.5 mcg) nasal spray, INSTILL 1 SPRAY IN EACH NOSTRIL 2 TIMES EVERY DAY, Disp: 30 mL, Rfl: 0  •  buPROPion XL (WELLBUTRIN XL) 150 mg 24 hr tablet, Take 1 tablet (150 mg total) by mouth once daily., Disp: 30 tablet, Rfl: 5  •  insulin regular (NovoLIN R) 100 unit/mL injection, inject by sliding scale with meals (according to carbohydrate scale), Disp: , Rfl:   •  levothyroxine (SYNTHROID) 75 mcg tablet, 1 TABLET ON AN EMPTY STOMACH IN THE MORNING, Disp: , Rfl: 3  •  norgestrel-ethinyl estradiol (CRYSELLE, 28,) 0.3-30 mg-mcg per tablet, take 1 tablet by oral route  every day, Disp: , Rfl:   •  NOVOLIN N NPH U-100 INSULIN 100 unit/mL injection, INJECT 40 UNITS SUBCUTANEOUSLY AT BEDTIME AS DIRECTED, Disp: , Rfl: 12  •  PARoxetine (PAXIL) 40 mg tablet, Take 1 tablet (40 mg total) by mouth once daily., Disp: 30 tablet, Rfl: 5  •  SUMAtriptan (IMITREX) 25 mg tablet, Take 1 tablet (25 mg total) by mouth once as needed for migraine for up to 1 dose. May repeat dose once in 2 hours if no relief., Disp: 9 tablet, Rfl: 0  •  zolpidem (AMBIEN) 5 mg tablet, TAKE 1 TABLET BY MOUTH EVERY DAY AT BEDTIME AS NEEDED FOR SLEEP, Disp: 30 tablet, Rfl: 1      BP Readings from Last 3 Encounters:   03/04/20 (!) 144/70   12/11/19 120/76   08/14/19 130/70       Recent Lab results:  Lab Results   Component Value Date    CHOL 150 07/22/2019   ,   Lab Results   Component Value Date    HDL 45 07/22/2019   ,   Lab Results   Component Value Date    LDLCALC 94 07/22/2019   ,   Lab Results   Component Value Date    TRIG 55 07/22/2019        Lab Results   Component Value Date    GLUCOSE 168 (H) 07/22/2019   ,   Lab Results   Component Value Date    HGBA1C 8.3 (H) 07/22/2019         Lab  Results   Component Value Date    CREATININE 0.89 07/22/2019       Lab Results   Component Value Date    TSH 2.830 07/22/2019

## 2020-03-27 RX ORDER — MONTELUKAST SODIUM 10 MG/1
TABLET ORAL
Qty: 30 TABLET | Refills: 5 | OUTPATIENT
Start: 2020-03-27

## 2020-04-09 ENCOUNTER — TELEPHONE (OUTPATIENT)
Dept: PRIMARY CARE | Facility: CLINIC | Age: 50
End: 2020-04-09

## 2020-04-09 RX ORDER — MONTELUKAST SODIUM 10 MG/1
10 TABLET ORAL NIGHTLY
Qty: 90 TABLET | Refills: 1 | Status: SHIPPED | OUTPATIENT
Start: 2020-04-09 | End: 2020-10-07

## 2020-04-09 RX ORDER — AZELASTINE HCL 205.5 UG/1
1 SPRAY NASAL 2 TIMES DAILY PRN
Qty: 30 ML | Refills: 0 | Status: SHIPPED | OUTPATIENT
Start: 2020-04-09 | End: 2020-04-30

## 2020-04-09 NOTE — TELEPHONE ENCOUNTER
Do you have enough medication for the next 5 days?: YES    Did you request this medication through your pharmacy or our patient portal in the last day or two? NO    Name of medication requested: MONTELUKAST  Medication Strength: 10MG TABLET  Mediation Directions:   Quantity Requested (example 30/90):   Number of refills requested:       System Generated Preferred Pharmacy(s)  are as follows.  If more than one pharmacy displays please identify which one should be used for this call    Has the pharmacy information below been confirmed with the patient?  YES       [id number] [place] - [address]  [address] [place] [address]  Phone: [phone] Fax: [phone]          If necessary, provide pharmacy details below.     Is this pharmacy a mail order pharmacy?:   Pharmacy Name:   Pharmacy City:   Pharmacy Telephone:     Additional Comments:    Next Encounter with this provider: 9/4/2020

## 2020-04-09 NOTE — TELEPHONE ENCOUNTER
Do you have enough medication for the next 5 days?: YES    Did you request this medication through your pharmacy or our patient portal in the last day or two? NO    Name of medication requested: AZELASTINE   Medication Strength: 205.5 MCG  Mediation Directions: 2 TIMES DAILY  Quantity Requested (example 30/90):   Number of refills requested:       System Generated Preferred Pharmacy(s)  are as follows.  If more than one pharmacy displays please identify which one should be used for this call    Has the pharmacy information below been confirmed with the patient?  YES       [id number] [place] - [address]  [address] [place] [address]  Phone: [phone] Fax: [phone]          If necessary, provide pharmacy details below.     Is this pharmacy a mail order pharmacy?:   Pharmacy Name:   Pharmacy City:   Pharmacy Telephone:     Additional Comments:    Next Encounter with this provider: 9/4/2020

## 2020-04-30 ENCOUNTER — TELEPHONE (OUTPATIENT)
Dept: PRIMARY CARE | Facility: CLINIC | Age: 50
End: 2020-04-30

## 2020-04-30 RX ORDER — AZELASTINE HCL 205.5 UG/1
SPRAY NASAL
Qty: 30 ML | Refills: 4 | Status: SHIPPED | OUTPATIENT
Start: 2020-04-30 | End: 2020-09-21

## 2020-05-05 ENCOUNTER — TELEPHONE (OUTPATIENT)
Dept: FAMILY MEDICINE | Facility: CLINIC | Age: 50
End: 2020-05-05

## 2020-05-05 LAB
ALBUMIN SERPL-MCNC: 3.9 G/DL (ref 3.8–4.9)
ALBUMIN/GLOB SERPL: 1.7 {RATIO} (ref 1.2–2.2)
ALP SERPL-CCNC: 85 IU/L (ref 39–117)
ALT SERPL-CCNC: 7 IU/L (ref 0–32)
AST SERPL-CCNC: 9 IU/L (ref 0–40)
BILIRUB SERPL-MCNC: 0.5 MG/DL (ref 0–1.2)
BUN SERPL-MCNC: 10 MG/DL (ref 6–24)
BUN/CREAT SERPL: 12 (ref 9–23)
CALCIUM SERPL-MCNC: 9.8 MG/DL (ref 8.7–10.2)
CHLORIDE SERPL-SCNC: 105 MMOL/L (ref 96–106)
CHOLEST SERPL-MCNC: 149 MG/DL (ref 100–199)
CO2 SERPL-SCNC: 23 MMOL/L (ref 20–29)
CREAT SERPL-MCNC: 0.82 MG/DL (ref 0.57–1)
GLOBULIN SER CALC-MCNC: 2.3 G/DL (ref 1.5–4.5)
GLUCOSE SERPL-MCNC: 143 MG/DL (ref 65–99)
HBA1C MFR BLD: 8.2 % (ref 4.8–5.6)
HDLC SERPL-MCNC: 44 MG/DL
LAB CORP EGFR IF AFRICN AM: 96 ML/MIN/1.73
LAB CORP EGFR IF NONAFRICN AM: 83 ML/MIN/1.73
LDLC SERPL CALC-MCNC: 87 MG/DL (ref 0–99)
POTASSIUM SERPL-SCNC: 4.5 MMOL/L (ref 3.5–5.2)
PROT SERPL-MCNC: 6.2 G/DL (ref 6–8.5)
SODIUM SERPL-SCNC: 141 MMOL/L (ref 134–144)
T4 FREE SERPL-MCNC: 1.22 NG/DL (ref 0.82–1.77)
TRIGL SERPL-MCNC: 89 MG/DL (ref 0–149)
TSH SERPL DL<=0.005 MIU/L-ACNC: 4.33 UIU/ML (ref 0.45–4.5)
VLDLC SERPL CALC-MCNC: 18 MG/DL (ref 5–40)

## 2020-05-05 NOTE — TELEPHONE ENCOUNTER
FYI - Patient is seeing Dr Darrell Vega at [facility]. I faxed her labs over to him at [phone].

## 2020-05-12 NOTE — TELEPHONE ENCOUNTER
Medicine Refill Request    Last Office Visit: Visit date not found  Next Office Visit: 9/4/2020        Current Outpatient Medications:   •  atorvastatin (LIPITOR) 10 mg tablet, Take 1 tablet (10 mg total) by mouth daily., Disp: 30 tablet, Rfl: 6  •  azelastine 0.15 % (205.5 mcg) nasal spray, ADMINISTER 1 SPRAY INTO EACH NOSTRIL 2 TIMES A DAY AS NEEDED FOR RHINITIS., Disp: 30 mL, Rfl: 4  •  buPROPion XL (WELLBUTRIN XL) 150 mg 24 hr tablet, Take 1 tablet (150 mg total) by mouth once daily., Disp: 30 tablet, Rfl: 5  •  insulin regular (NovoLIN R) 100 unit/mL injection, inject by sliding scale with meals (according to carbohydrate scale), Disp: , Rfl:   •  levothyroxine (SYNTHROID) 75 mcg tablet, 1 TABLET ON AN EMPTY STOMACH IN THE MORNING, Disp: , Rfl: 3  •  montelukast (SINGULAIR) 10 mg tablet, Take 1 tablet (10 mg total) by mouth nightly., Disp: 90 tablet, Rfl: 1  •  norgestrel-ethinyl estradiol (CRYSELLE, 28,) 0.3-30 mg-mcg per tablet, take 1 tablet by oral route  every day, Disp: , Rfl:   •  NOVOLIN N NPH U-100 INSULIN 100 unit/mL injection, INJECT 40 UNITS SUBCUTANEOUSLY AT BEDTIME AS DIRECTED, Disp: , Rfl: 12  •  PARoxetine (PAXIL) 40 mg tablet, Take 1 tablet (40 mg total) by mouth once daily., Disp: 30 tablet, Rfl: 5  •  SUMAtriptan (IMITREX) 25 mg tablet, Take 1 tablet (25 mg total) by mouth once as needed for migraine for up to 1 dose. May repeat dose once in 2 hours if no relief., Disp: 9 tablet, Rfl: 0  •  zolpidem (AMBIEN) 5 mg tablet, TAKE 1 TABLET BY MOUTH EVERY DAY AT BEDTIME AS NEEDED FOR SLEEP, Disp: 30 tablet, Rfl: 1      BP Readings from Last 3 Encounters:   03/04/20 (!) 144/70   12/11/19 120/76   08/14/19 130/70       Recent Lab results:  Lab Results   Component Value Date    CHOL 149 05/04/2020   ,   Lab Results   Component Value Date    HDL 44 05/04/2020   ,   Lab Results   Component Value Date    LDLCALC 87 05/04/2020   ,   Lab Results   Component Value Date    TRIG 89 05/04/2020        Lab  Results   Component Value Date    GLUCOSE 143 (H) 05/04/2020   ,   Lab Results   Component Value Date    HGBA1C 8.2 (H) 05/04/2020         Lab Results   Component Value Date    CREATININE 0.82 05/04/2020       Lab Results   Component Value Date    TSH 4.330 05/04/2020

## 2020-05-12 NOTE — TELEPHONE ENCOUNTER
Do you have enough medication for the next 5 days?: yes    Did you request this medication through your pharmacy or our patient portal in the last day or two? no    Name of medication requested: zolpidem  Medication Strength: 5mg  Mediation Directions: bedtime  Quantity Requested (example 30/90): 30  Number of refills requested:       System Generated Preferred Pharmacy(s)  are as follows.  If more than one pharmacy displays please identify which one should be used for this call    Has the pharmacy information below been confirmed with the patient?  yes       [id number] [place] - [address]  [address] [place] [address]  Phone: [phone] Fax: [phone]          If necessary, provide pharmacy details below.     Is this pharmacy a mail order pharmacy?:   Pharmacy Name:   Pharmacy City:   Pharmacy Telephone:     Additional Comments:    Next Encounter with this provider: 9/4/2020

## 2020-05-13 RX ORDER — ZOLPIDEM TARTRATE 5 MG/1
5 TABLET ORAL NIGHTLY PRN
Qty: 30 TABLET | Refills: 1 | Status: SHIPPED | OUTPATIENT
Start: 2020-05-13 | End: 2020-07-24

## 2020-05-15 ENCOUNTER — TELEPHONE (OUTPATIENT)
Dept: PRIMARY CARE | Facility: CLINIC | Age: 50
End: 2020-05-15

## 2020-05-15 NOTE — TELEPHONE ENCOUNTER
Patientt requesting a copy of her eye report from [facility]  to be faxed to her   Endocrine Specialist.     Darrell Vega     Fax # [phone]

## 2020-07-24 RX ORDER — ZOLPIDEM TARTRATE 5 MG/1
TABLET ORAL
Qty: 30 TABLET | Refills: 1 | Status: SHIPPED | OUTPATIENT
Start: 2020-07-24 | End: 2020-07-28 | Stop reason: SDUPTHER

## 2020-07-27 ENCOUNTER — TELEPHONE (OUTPATIENT)
Dept: PRIMARY CARE | Facility: CLINIC | Age: 50
End: 2020-07-27

## 2020-07-27 NOTE — TELEPHONE ENCOUNTER
[facility]  NPI [id number], diagnosis [id number]    Please submit. Thanks

## 2020-07-28 RX ORDER — ZOLPIDEM TARTRATE 5 MG/1
5 TABLET ORAL NIGHTLY PRN
Qty: 30 TABLET | Refills: 1 | Status: SHIPPED | OUTPATIENT
Start: 2020-07-28 | End: 2020-10-30 | Stop reason: SDUPTHER

## 2020-07-28 NOTE — TELEPHONE ENCOUNTER
Do you have enough medication for the next 5 days?: yes    Did you request this medication through your pharmacy or our patient portal in the last day or two? no    Name of medication requested: zolpidem (AMBIEN)   Medication Strength: 5mg tablet  Mediation Directions: at night as needed  Quantity Requested (example 30/90): 30  Number of refills requested:       System Generated Preferred Pharmacy(s)  are as follows.  If more than one pharmacy displays please identify which one should be used for this call    Has the pharmacy information below been confirmed with the patient?yes         [id number] [place] - [address]  [address] [place] [address]  Phone: [phone] Fax: [phone]          If necessary, provide pharmacy details below.     Is this pharmacy a mail order pharmacy?:   Pharmacy Name:   Pharmacy City:   Pharmacy Telephone:     Additional Comments:    Next Encounter with this provider: 9/4/2020

## 2020-08-12 ENCOUNTER — OFFICE VISIT (OUTPATIENT)
Dept: PRIMARY CARE | Facility: CLINIC | Age: 50
End: 2020-08-12
Payer: COMMERCIAL

## 2020-08-12 VITALS
TEMPERATURE: 98.7 F | BODY MASS INDEX: 35.01 KG/M2 | WEIGHT: 197.6 LBS | HEART RATE: 80 BPM | SYSTOLIC BLOOD PRESSURE: 128 MMHG | OXYGEN SATURATION: 99 % | RESPIRATION RATE: 18 BRPM | HEIGHT: 63 IN | DIASTOLIC BLOOD PRESSURE: 80 MMHG

## 2020-08-12 DIAGNOSIS — J30.89 OTHER ALLERGIC RHINITIS: ICD-10-CM

## 2020-08-12 DIAGNOSIS — H65.03 BILATERAL ACUTE SEROUS OTITIS MEDIA, RECURRENCE NOT SPECIFIED: Primary | ICD-10-CM

## 2020-08-12 PROCEDURE — 99213 OFFICE O/P EST LOW 20 MIN: CPT | Performed by: NURSE PRACTITIONER

## 2020-08-12 RX ORDER — LORATADINE 10 MG/1
10 TABLET ORAL DAILY
Qty: 30 TABLET | Refills: 3 | Status: SHIPPED | OUTPATIENT
Start: 2020-08-12 | End: 2021-01-07 | Stop reason: SDUPTHER

## 2020-08-12 RX ORDER — AZITHROMYCIN 250 MG/1
TABLET, FILM COATED ORAL
COMMUNITY
Start: 2020-06-10 | End: 2020-09-04

## 2020-08-12 RX ORDER — DOXYCYCLINE 100 MG/1
100 CAPSULE ORAL 2 TIMES DAILY
Qty: 20 CAPSULE | Refills: 0 | Status: SHIPPED | OUTPATIENT
Start: 2020-08-12 | End: 2020-09-04

## 2020-08-12 RX ORDER — SODIUM FLUORIDE 5 MG/G
PASTE, DENTIFRICE ORAL
COMMUNITY
Start: 2020-06-11 | End: 2024-04-19

## 2020-08-12 ASSESSMENT — ENCOUNTER SYMPTOMS
AGITATION: 0
NAUSEA: 0
NEUROLOGICAL NEGATIVE: 1
CONFUSION: 0
DIAPHORESIS: 0
PALPITATIONS: 0
ENDOCRINE NEGATIVE: 1
EYES NEGATIVE: 1
ABDOMINAL PAIN: 0
SHORTNESS OF BREATH: 0
FEVER: 0
DIARRHEA: 0
WOUND: 0
FATIGUE: 0
SINUS PRESSURE: 0
SORE THROAT: 0
HEMATOLOGIC/LYMPHATIC NEGATIVE: 1
COUGH: 0
MUSCULOSKELETAL NEGATIVE: 1
SINUS PAIN: 0
CHEST TIGHTNESS: 0
ALLERGIC/IMMUNOLOGIC NEGATIVE: 1

## 2020-08-12 NOTE — PROGRESS NOTES
"Subjective      Patient ID: Annette Montes De Oca is a 50 y.o. female.  1970      Pt. Reports one week of bilateral ear pain and pressure.  Pt denies fever but admits to having ear infections in the past.  Pt denies fever.  Pt. Admits to having allergy and sinus issues.  Pt admits to mild post nasal drip.       The following have been reviewed and updated as appropriate in this visit:  Allergies  Meds  Problems       Review of Systems   Constitutional: Negative for diaphoresis, fatigue and fever.   HENT: Positive for congestion, ear pain and postnasal drip. Negative for sinus pressure, sinus pain, sneezing and sore throat.    Eyes: Negative.    Respiratory: Negative for cough, chest tightness and shortness of breath.    Cardiovascular: Negative for chest pain and palpitations.   Gastrointestinal: Negative for abdominal pain, diarrhea and nausea.   Endocrine: Negative.    Genitourinary: Negative.    Musculoskeletal: Negative.    Skin: Negative for rash and wound.   Allergic/Immunologic: Negative.    Neurological: Negative.    Hematological: Negative.    Psychiatric/Behavioral: Negative for agitation, behavioral problems, confusion, self-injury and suicidal ideas.       Objective     Vitals:    08/12/20 1303   BP: 128/80   BP Location: Left upper arm   Patient Position: Sitting   Pulse: 80   Resp: 18   Temp: 37.1 °C (98.7 °F)   TempSrc: Oral   SpO2: 99%   Weight: 89.6 kg (197 lb 9.6 oz)   Height: 1.6 m (5' 3\")     Body mass index is 35 kg/m².    Physical Exam   Constitutional: She is oriented to person, place, and time. Vital signs are normal. She is cooperative.  Non-toxic appearance. She does not have a sickly appearance. She does not appear ill. No distress.   HENT:   Head: Normocephalic.   Right Ear: Hearing, external ear and ear canal normal. Tympanic membrane is erythematous and bulging. A middle ear effusion is present.   Left Ear: Hearing, external ear and ear canal normal. Tympanic membrane is erythematous " and bulging. A middle ear effusion is present.   Nose: Mucosal edema present.   Mouth/Throat: Uvula is midline and mucous membranes are normal. Posterior oropharyngeal erythema present. No posterior oropharyngeal edema.   Eyes: Pupils are equal, round, and reactive to light. Conjunctivae, EOM and lids are normal.   Neck: Trachea normal and normal range of motion.   Cardiovascular: Regular rhythm, S1 normal and S2 normal.   Pulmonary/Chest: Effort normal and breath sounds normal.   Abdominal: Normal appearance and bowel sounds are normal. There is no tenderness.   Neurological: She is alert and oriented to person, place, and time. She has normal strength.   Skin: Skin is warm, dry and intact.   Psychiatric: She has a normal mood and affect. Her speech is normal and behavior is normal. Judgment and thought content normal. Cognition and memory are normal.       Assessment/Plan   Diagnoses and all orders for this visit:    Bilateral acute serous otitis media, recurrence not specified (Primary)  -     doxycycline hyclate (VIBRAMYCIN) 100 mg capsule; Take 1 capsule (100 mg total) by mouth 2 (two) times a day for 10 days.    Other allergic rhinitis  -     loratadine (CLARITIN) 10 mg tablet; Take 1 tablet (10 mg total) by mouth daily.    Bilateral otitis media - secondary to allergies uncontrolled - pt education on nasal spray - daily antihistamine - Doxycycline x 10 days, follow up as needed

## 2020-08-12 NOTE — PATIENT INSTRUCTIONS
Please take Doxycycline 2x per day for 10 days.  Take Claritin every morning for the next one month.  Use nose spray two squirts in each nostril at night spraying to the outside of each nostril once inserted.      Thank you for choosing [facility].

## 2020-09-04 ENCOUNTER — OFFICE VISIT (OUTPATIENT)
Dept: PRIMARY CARE | Facility: CLINIC | Age: 50
End: 2020-09-04
Payer: COMMERCIAL

## 2020-09-04 VITALS
TEMPERATURE: 98.9 F | SYSTOLIC BLOOD PRESSURE: 140 MMHG | RESPIRATION RATE: 16 BRPM | HEIGHT: 63 IN | WEIGHT: 195.8 LBS | DIASTOLIC BLOOD PRESSURE: 70 MMHG | OXYGEN SATURATION: 98 % | BODY MASS INDEX: 34.69 KG/M2 | HEART RATE: 81 BPM

## 2020-09-04 DIAGNOSIS — Z23 NEED FOR VACCINATION: ICD-10-CM

## 2020-09-04 DIAGNOSIS — E03.9 ACQUIRED HYPOTHYROIDISM: ICD-10-CM

## 2020-09-04 DIAGNOSIS — E10.9 TYPE 1 DIABETES MELLITUS WITHOUT COMPLICATION (CMS/HCC): Primary | ICD-10-CM

## 2020-09-04 DIAGNOSIS — Z12.39 BREAST CANCER SCREENING: ICD-10-CM

## 2020-09-04 DIAGNOSIS — F41.9 ANXIETY: ICD-10-CM

## 2020-09-04 PROCEDURE — 90471 IMMUNIZATION ADMIN: CPT | Performed by: FAMILY MEDICINE

## 2020-09-04 PROCEDURE — 90686 IIV4 VACC NO PRSV 0.5 ML IM: CPT | Performed by: FAMILY MEDICINE

## 2020-09-04 PROCEDURE — 99214 OFFICE O/P EST MOD 30 MIN: CPT | Mod: 25 | Performed by: FAMILY MEDICINE
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
## 2020-09-07 ASSESSMENT — ENCOUNTER SYMPTOMS
ABDOMINAL PAIN: 0
COUGH: 0
DIZZINESS: 0
DYSURIA: 0
AGITATION: 0
WEAKNESS: 0
CHILLS: 0
EYE DISCHARGE: 0
JOINT SWELLING: 0
CONFUSION: 0
CHEST TIGHTNESS: 0
POLYPHAGIA: 0
WHEEZING: 0
FEVER: 0
DYSPHORIC MOOD: 1
BLOOD IN STOOL: 0
BRUISES/BLEEDS EASILY: 0
PALPITATIONS: 0
SINUS PRESSURE: 0
NERVOUS/ANXIOUS: 1

## 2020-09-08 ENCOUNTER — TELEPHONE (OUTPATIENT)
Dept: PRIMARY CARE | Facility: CLINIC | Age: 50
End: 2020-09-08

## 2020-09-08 NOTE — TELEPHONE ENCOUNTER
Description of Request (Referral/PreAuth/Testing): Referral    Diagnosis/Reason for Request (Include ICD-10 Code if available): Diabetic eye exam       Name of Facility or Specialist providing  Service: [facility] ( [facility])   NPI#: [id number]  Address/Telephone #: [address]   [address] [place] [address]    Date of Appointment: 09/11/2020    Insurance Plan: [employer]   Insurance ID: [id number]    Additional Comments:       Next Encounter with provider: Visit date not found

## 2020-09-09 ENCOUNTER — PATIENT OUTREACH (OUTPATIENT)
Dept: CASE MANAGEMENT | Facility: CLINIC | Age: 50
End: 2020-09-09

## 2020-09-09 LAB — HM MAMMOGRAM: NORMAL

## 2020-09-09 NOTE — PROGRESS NOTES
Social Work Note     received referral from PCP RN.    Patient is a 50 year old female diagnosed with Diabetes Mellitus Type 1.  She needs to start on an insuline pump.  Pt's endocrinologist told her that it would cost $10,000 and was not very helpful for her.  PCP would like SW to assist pt with insuline pump request.      KELVIN spoke with [facility] RN and reviewed insuline pump request.  Endocrinologist must submit insuline pump prior auth request.with all the required information.  Patient also needs to attend diabetic education classes in order to learn how to use it.    KELVIN placed call to pt, no answer.  KELVIN lft VM message for pt and requested a return call.    SW to follow up.    Yesi Gr, MSW  [phone]

## 2020-09-11 ENCOUNTER — PATIENT OUTREACH (OUTPATIENT)
Dept: CASE MANAGEMENT | Facility: CLINIC | Age: 50
End: 2020-09-11

## 2020-09-11 NOTE — PROGRESS NOTES
Social Work Note      received referral from PCP RN.     Patient is a 50 year old female diagnosed with Diabetes Mellitus Type 1.  She needs to start on an insuline pump.  Pt's endocrinologist told her that it would cost $10,000 and was not very helpful for her.  PCP would like SW to assist pt with insuline pump request.       KELVIN spoke with [facility] RN and reviewed insuline pump request.  Endocrinologist must submit insuline pump prior auth request.with all the required information.  Patient also needs to attend diabetic education classes in order to learn how to use it.     KELVIN placed 2nd  call to pt, no answer.  KELVIN left 2nd VM message for pt and requested a return call.     SW to follow up.     Yesi Gr, MSW  [phone]

## 2020-09-21 RX ORDER — AZELASTINE HCL 205.5 UG/1
SPRAY NASAL
Qty: 30 ML | Refills: 4 | Status: SHIPPED | OUTPATIENT
Start: 2020-09-21 | End: 2024-03-11

## 2020-09-25 ENCOUNTER — PATIENT OUTREACH (OUTPATIENT)
Dept: CASE MANAGEMENT | Facility: CLINIC | Age: 50
End: 2020-09-25

## 2020-09-25 NOTE — PROGRESS NOTES
Social Work Note      received referral from PCP RN.     Patient is a 50 year old female diagnosed with Diabetes Mellitus Type 1.  She needs to start on an insuline pump.  Pt's endocrinologist told her that it would cost $10,000 and was not very helpful for her.  PCP would like SW to assist pt with insuline pump request.       KELVIN spoke with [facility] RN and reviewed insuline pump request.  Endocrinologist must submit insuline pump prior auth request.with all the required information.  Patient also needs to attend diabetic education classes in order to learn how to use it.     KELVIN placed 3rd  call to pt, no answer.  SW left 3rd VM message for pt and requested a return call.    SW will remain available if patient needs further assistance.  KELVIN to follow as requested, no further calls planned at this time.     Yesi Gr, MSW  [phone]

## 2020-10-05 NOTE — TELEPHONE ENCOUNTER
appt 10/6     Alternatives Discussed Intro (Do Not Add Period): I discussed alternative treatments to Mohs surgery and specifically discussed the risks and benefits of

## 2020-10-06 ENCOUNTER — OFFICE VISIT (OUTPATIENT)
Dept: PRIMARY CARE | Facility: CLINIC | Age: 50
End: 2020-10-06
Payer: COMMERCIAL

## 2020-10-06 VITALS
HEIGHT: 63 IN | WEIGHT: 193 LBS | DIASTOLIC BLOOD PRESSURE: 85 MMHG | BODY MASS INDEX: 34.2 KG/M2 | RESPIRATION RATE: 16 BRPM | TEMPERATURE: 98.8 F | OXYGEN SATURATION: 98 % | HEART RATE: 77 BPM | SYSTOLIC BLOOD PRESSURE: 132 MMHG

## 2020-10-06 DIAGNOSIS — E10.628 TYPE 1 DIABETES MELLITUS WITH OTHER SKIN COMPLICATION: ICD-10-CM

## 2020-10-06 DIAGNOSIS — H92.02 CHRONIC EAR PAIN, LEFT: ICD-10-CM

## 2020-10-06 DIAGNOSIS — J30.2 SEASONAL ALLERGIES: Primary | ICD-10-CM

## 2020-10-06 DIAGNOSIS — G89.29 CHRONIC EAR PAIN, LEFT: ICD-10-CM

## 2020-10-06 PROCEDURE — 99214 OFFICE O/P EST MOD 30 MIN: CPT | Performed by: NURSE PRACTITIONER

## 2020-10-06 RX ORDER — HUMAN INSULIN 100 [IU]/ML
INJECTION, SUSPENSION SUBCUTANEOUS
COMMUNITY
Start: 2020-09-11 | End: 2025-03-12

## 2020-10-06 ASSESSMENT — ENCOUNTER SYMPTOMS
APPETITE CHANGE: 0
FEVER: 0
DIARRHEA: 0
NAUSEA: 0
SINUS PAIN: 0
ARTHRALGIAS: 0
SHORTNESS OF BREATH: 0
CHILLS: 0
WHEEZING: 0
SORE THROAT: 1
COUGH: 1
CHEST TIGHTNESS: 0
FATIGUE: 0
ADENOPATHY: 0
VOMITING: 0
EYE DISCHARGE: 1
TROUBLE SWALLOWING: 0
DIAPHORESIS: 0
SINUS PRESSURE: 1
MYALGIAS: 0
ABDOMINAL PAIN: 0
EYE REDNESS: 1
EYE ITCHING: 1

## 2020-10-06 NOTE — PROGRESS NOTES
"      Subjective      Patient ID: Annette Montes De Oca is a 50 y.o. female.  1970      Pt presents for c/o possible ear infection.    She says she has recurrent issues with left ear pain/infections.  Allergy symptoms started acting up about 2 weeks ago.  Left ear \"pressure\" started today.  Pt was seen in office on 8/12 and diagnosed with antonino serous otitis media secondary to uncontrolled allergies and was started on doxy and claritin.  She has been taking azelastine, claritin, and sinuglair daily since her last visit.  Feels like her allergies have been difficult to control the past few months.  She does not see allergy/immunology.  She says she was on Flonase years ago which worked well for her, but a previous PCP switched her to azelastine but does not remember why.    Earache / Otalgia   There is pain in the left ear. This is a recurrent problem. The current episode started today. The problem occurs every few hours. The problem has been unchanged. The pain is at a severity of 2/10. The pain is mild. Associated symptoms include coughing and a sore throat. Pertinent negatives include no abdominal pain, diarrhea, rash or vomiting. She has tried NSAIDs for the symptoms. The treatment provided mild relief.       The following have been reviewed and updated as appropriate in this visit:  Allergies  Meds  Problems       Review of Systems   Constitutional: Negative for appetite change, chills, diaphoresis, fatigue and fever.   HENT: Positive for congestion, ear pain, postnasal drip, sinus pressure and sore throat. Negative for sinus pain, sneezing and trouble swallowing.    Eyes: Positive for discharge (watery), redness and itching.   Respiratory: Positive for cough. Negative for chest tightness, shortness of breath and wheezing.    Cardiovascular: Negative for chest pain.   Gastrointestinal: Negative for abdominal pain, diarrhea, nausea and vomiting.   Musculoskeletal: Negative for arthralgias and myalgias.   Skin: " "Negative for rash.   Allergic/Immunologic: Positive for environmental allergies.   Hematological: Negative for adenopathy.       Objective     Vitals:    10/06/20 1554   BP: 132/85   BP Location: Left upper arm   Patient Position: Sitting   Pulse: 77   Resp: 16   Temp: 37.1 °C (98.8 °F)   TempSrc: Oral   SpO2: 98%   Weight: 87.5 kg (193 lb)   Height: 1.6 m (5' 3\")     Body mass index is 34.19 kg/m².    Physical Exam  Constitutional:       General: She is not in acute distress.     Appearance: She is well-developed.   HENT:      Right Ear: Ear canal and external ear normal. No drainage or tenderness. Tympanic membrane is not erythematous, retracted or bulging.      Left Ear: Ear canal and external ear normal. No drainage or tenderness. Tympanic membrane is not erythematous, retracted or bulging.      Nose: No mucosal edema or rhinorrhea.      Right Sinus: No maxillary sinus tenderness or frontal sinus tenderness.      Left Sinus: No maxillary sinus tenderness or frontal sinus tenderness.      Mouth/Throat:      Pharynx: No oropharyngeal exudate or posterior oropharyngeal erythema.      Tonsils: No tonsillar exudate.      Comments: + PND  Eyes:      Conjunctiva/sclera: Conjunctivae normal.   Cardiovascular:      Rate and Rhythm: Normal rate and regular rhythm.      Heart sounds: S1 normal and S2 normal.   Pulmonary:      Effort: Pulmonary effort is normal.      Breath sounds: Normal breath sounds. No wheezing or rhonchi.   Lymphadenopathy:      Cervical: No cervical adenopathy.   Skin:     General: Skin is warm and dry.      Findings: No rash.         Assessment/Plan   Diagnoses and all orders for this visit:    Seasonal allergies (Primary)  -     Ambulatory referral to Allergy; Future    Type 1 diabetes mellitus with other skin complication (CMS/[facility])      Pt will switch from azelastine to Flonase.  She will schedule appt with allergist- referral placed and info provided.  Discussed holding of on antibiotic for now " since her ears did not appear to be infected today- will see if switching to Flonase will help.  She will contact us if ear pain is not any better or is worse in another week or so.    Will hold off on medrol dose pack due to pt's fasting sugars being high- low 400 yesterday and low 300 today.    Discussed possible need to see ENT as well, as she also had visit on 12/11/19 for left ear infection.

## 2020-10-07 RX ORDER — MONTELUKAST SODIUM 10 MG/1
TABLET ORAL
Qty: 30 TABLET | Refills: 5 | Status: SHIPPED | OUTPATIENT
Start: 2020-10-07 | End: 2021-05-17

## 2020-10-12 ENCOUNTER — TELEPHONE (OUTPATIENT)
Dept: PRIMARY CARE | Facility: CLINIC | Age: 50
End: 2020-10-12

## 2020-10-12 NOTE — TELEPHONE ENCOUNTER
Pt called and wanted to let EZ know that her family is going to be able to help her get the insulin pump.

## 2020-10-21 ENCOUNTER — TELEPHONE (OUTPATIENT)
Dept: PRIMARY CARE | Facility: CLINIC | Age: 50
End: 2020-10-21

## 2020-10-21 RX ORDER — BUPROPION HYDROCHLORIDE 150 MG/1
150 TABLET ORAL
Qty: 30 TABLET | Refills: 5 | Status: SHIPPED | OUTPATIENT
Start: 2020-10-21 | End: 2021-04-06

## 2020-10-21 RX ORDER — PAROXETINE HYDROCHLORIDE 40 MG/1
40 TABLET, FILM COATED ORAL
Qty: 30 TABLET | Refills: 5 | Status: SHIPPED | OUTPATIENT
Start: 2020-10-21 | End: 2021-04-06

## 2020-10-21 NOTE — TELEPHONE ENCOUNTER
PARoxetine (PAXIL) 40 mg tablet    buPROPion XL (WELLBUTRIN XL) 150 mg 24 hr tablet     Pharmacy:  [facility]/pharmacy #[id number] - [place] - [address]   Phone:  [phone]   Fax:  [phone]   Address:  [address] [place] [address]

## 2020-10-30 NOTE — TELEPHONE ENCOUNTER
zolpidem (AMBIEN) 5 mg tablet     Pharmacy:  [facility]/pharmacy #[id number] - [place] - [address]   Phone:  [phone]   Fax:  [phone]   Address:  [address] [place] [address]

## 2020-11-01 RX ORDER — ZOLPIDEM TARTRATE 5 MG/1
5 TABLET ORAL NIGHTLY PRN
Qty: 30 TABLET | Refills: 1 | Status: SHIPPED | OUTPATIENT
Start: 2020-11-01 | End: 2021-01-07 | Stop reason: SDUPTHER

## 2020-12-15 ENCOUNTER — OFFICE VISIT (OUTPATIENT)
Dept: PRIMARY CARE | Facility: CLINIC | Age: 50
End: 2020-12-15
Payer: COMMERCIAL

## 2020-12-15 ENCOUNTER — DOCUMENTATION (OUTPATIENT)
Dept: PRIMARY CARE | Facility: CLINIC | Age: 50
End: 2020-12-15

## 2020-12-15 VITALS
SYSTOLIC BLOOD PRESSURE: 128 MMHG | DIASTOLIC BLOOD PRESSURE: 70 MMHG | TEMPERATURE: 100.8 F | OXYGEN SATURATION: 98 % | RESPIRATION RATE: 16 BRPM | HEIGHT: 63 IN | BODY MASS INDEX: 34.02 KG/M2 | HEART RATE: 96 BPM | WEIGHT: 192 LBS

## 2020-12-15 DIAGNOSIS — L02.211 CUTANEOUS ABSCESS OF ABDOMINAL WALL: Primary | ICD-10-CM

## 2020-12-15 DIAGNOSIS — E10.65 TYPE 1 DIABETES MELLITUS WITH HYPERGLYCEMIA (CMS/HCC): ICD-10-CM

## 2020-12-15 PROCEDURE — 99213 OFFICE O/P EST LOW 20 MIN: CPT | Performed by: FAMILY MEDICINE

## 2020-12-15 NOTE — PROGRESS NOTES
Subjective      Patient ID: Annette Montes De Oca is a 50 y.o. female.      HPI    The following have been reviewed and updated as appropriate in this visit:  Allergies  Meds  Problems            Pt with hx of type 1 dm hypothyroidism and coronary atherosclerosis on CT started in lipitor last fall - Diabetes type 1 - endo - needs to be rescheduled -will be getting pump with CGM     Presents because of red area right abd wall   Very tender and getting bigger   Now has fever today   Started 2 days ago tried topical abx        Lab Results   Component Value Date    HGBA1C 8.2 (H) 05/04/2020    HGBA1C 8.3 (H) 07/22/2019    HGBA1C 8.0 (H) 10/06/2017     Lab Results   Component Value Date    MICROALBUR <3.0 07/22/2019    LDLCALC 87 05/04/2020    CREATININE 0.82 05/04/2020            Review of Systems      Current Outpatient Medications:   •  atorvastatin (LIPITOR) 10 mg tablet, Take 1 tablet (10 mg total) by mouth daily., Disp: 30 tablet, Rfl: 6  •  azelastine 0.15 % (205.5 mcg) nasal spray, ADMINISTER 1 SPRAY INTO EACH NOSTRIL 2 TIMES A DAY AS NEEDED FOR RHINITIS., Disp: 30 mL, Rfl: 4  •  buPROPion XL (WELLBUTRIN XL) 150 mg 24 hr tablet, Take 1 tablet (150 mg total) by mouth once daily., Disp: 30 tablet, Rfl: 5  •  CLINPRO 5000 1.1 % paste, BRUSH TEETH FOR 1 MINUTE TWICE A DAY DO NOT RINSE, EAT OR DRINK FOR 30 MINS AFTER, Disp: , Rfl:   •  insulin regular (NovoLIN R) 100 unit/mL injection, inject by sliding scale with meals (according to carbohydrate scale), Disp: , Rfl:   •  levothyroxine (SYNTHROID) 75 mcg tablet, 1 TABLET ON AN EMPTY STOMACH IN THE MORNING, Disp: , Rfl: 3  •  loratadine (CLARITIN) 10 mg tablet, Take 1 tablet (10 mg total) by mouth daily., Disp: 30 tablet, Rfl: 3  •  montelukast (SINGULAIR) 10 mg tablet, TAKE 1 TABLET BY MOUTH EVERY DAY AT NIGHT, Disp: 30 tablet, Rfl: 5  •  norgestrel-ethinyl estradiol (CRYSELLE, [place],) 0.3-30 mg-mcg per tablet, take 1 tablet by oral route  every day, Disp: , Rfl:   •   "NovoLIN N NPH U-100 Insulin 100 unit/mL injection, INJECT 40 UNITS SUBCUTANEOUS DAILY AT BEDTIME, Disp: , Rfl:   •  PARoxetine (PAXIL) 40 mg tablet, Take 1 tablet (40 mg total) by mouth once daily., Disp: 30 tablet, Rfl: 5  •  zolpidem (AMBIEN) 5 mg tablet, Take 1 tablet (5 mg total) by mouth nightly as needed for sleep., Disp: 30 tablet, Rfl: 1    Past Medical History:   Diagnosis Date   • Diabetes mellitus type I (CMS/HCC)    • Disease of thyroid gland      Past Surgical History:   Procedure Laterality Date   • CARPAL TUNNEL RELEASE     • FOOT SURGERY     • HERNIA REPAIR     • MOUTH SURGERY     • OOPHORECTOMY     • SINUS SURGERY     • WISDOM TOOTH EXTRACTION       Objective     Vitals:    12/15/20 1501   BP: 128/70   BP Location: Right upper arm   Patient Position: Sitting   Pulse: 96   Resp: 16   Temp: (!) 38.2 °C (100.8 °F)   TempSrc: Oral   SpO2: 98%   Weight: 87.1 kg (192 lb)   Height: 1.6 m (5' 3\")       Body mass index is 34.01 kg/m².    Physical Exam  Constitutional:       General: She is not in acute distress.     Appearance: Normal appearance. She is obese. She is not ill-appearing, toxic-appearing or diaphoretic.   HENT:      Head: Normocephalic and atraumatic.   Neck:      Musculoskeletal: Normal range of motion and neck supple.   Cardiovascular:      Rate and Rhythm: Normal rate.   Pulmonary:      Effort: Pulmonary effort is normal.      Breath sounds: Normal breath sounds.   Abdominal:      General: Bowel sounds are normal.      Comments: 12x 9 cm area of erythema with large subcutaneous collection   Tender    Neurological:      General: No focal deficit present.      Mental Status: She is alert.   Psychiatric:         Mood and Affect: Mood normal.         Behavior: Behavior normal.         Thought Content: Thought content normal.         Judgment: Judgment normal.         Assessment/Plan   Problem List Items Addressed This Visit        Endocrine/Metabolic    Type 1 diabetes mellitus (CMS/HCC)    "   Other Visit Diagnoses     Cutaneous abscess of abdominal wall    -  Primary      needs to go to ER   Type 1 dm with fair - poor control BG in 300's febrile    pt large abcess - needs IV abx   GO to hosp from here  - her endo is at [facility] so prefers to go there   Called ER and gave triage information           There are no Patient Instructions on file for this visit.    Ruba Beavers MD

## 2020-12-15 NOTE — PROGRESS NOTES
Shama Nguyen MA has completed a chart review for Annette Montes De Oca.  Care Gap Source: [facility]    Care Gap(s) Identified: Colorectal Cancer Screening    Chart Review Completed: Yes    Pt outreach not needed. She has an OV today with PCP. Appointment notes edited to reflect care gap.

## 2020-12-16 ENCOUNTER — TELEPHONE (OUTPATIENT)
Dept: PRIMARY CARE | Facility: CLINIC | Age: 50
End: 2020-12-16

## 2020-12-16 RX ORDER — ATORVASTATIN CALCIUM 10 MG/1
10 TABLET, FILM COATED ORAL DAILY
Qty: 30 TABLET | Refills: 3 | Status: SHIPPED | OUTPATIENT
Start: 2020-12-16 | End: 2021-05-17

## 2020-12-18 ENCOUNTER — OFFICE VISIT (OUTPATIENT)
Dept: PRIMARY CARE | Facility: CLINIC | Age: 50
End: 2020-12-18
Payer: COMMERCIAL

## 2020-12-18 VITALS
WEIGHT: 192 LBS | RESPIRATION RATE: 16 BRPM | OXYGEN SATURATION: 99 % | DIASTOLIC BLOOD PRESSURE: 80 MMHG | BODY MASS INDEX: 34.02 KG/M2 | HEART RATE: 91 BPM | TEMPERATURE: 99.1 F | HEIGHT: 63 IN | SYSTOLIC BLOOD PRESSURE: 130 MMHG

## 2020-12-18 DIAGNOSIS — L02.211 ABDOMINAL WALL ABSCESS: Primary | ICD-10-CM

## 2020-12-18 PROCEDURE — 99214 OFFICE O/P EST MOD 30 MIN: CPT | Performed by: NURSE PRACTITIONER

## 2020-12-18 RX ORDER — FLUTICASONE PROPIONATE 50 MCG
1 SPRAY, SUSPENSION (ML) NASAL DAILY
COMMUNITY
End: 2023-09-12 | Stop reason: SDUPTHER

## 2020-12-18 ASSESSMENT — ENCOUNTER SYMPTOMS: WOUND: 1

## 2020-12-18 NOTE — TELEPHONE ENCOUNTER
LVM for patient to call back and let us know if she did in fact go to the ER the other day. If so, which location so we can request the notes from that visit.

## 2020-12-18 NOTE — PROGRESS NOTES
"      Subjective      Patient ID: Annette Montes De Oca is a 50 y.o. female.  1970      F/u ED visit 12/1/5/20, given 600 mg  IV clindamycin and sent home on clindamycin 300 mg daily for 7 days.      The following have been reviewed and updated as appropriate in this visit:  Tobacco  Allergies  Meds  Problems  Med Hx  Surg Hx  Fam Hx       Review of Systems   Skin: Positive for wound.       Objective     Vitals:    12/18/20 1528   BP: 130/80   BP Location: Right upper arm   Patient Position: Sitting   Pulse: 91   Resp: 16   Temp: 37.3 °C (99.1 °F)   TempSrc: Oral   SpO2: 99%   Weight: 87.1 kg (192 lb)   Height: 1.6 m (5' 3\")     Body mass index is 34.01 kg/m².    Physical Exam  Vitals signs and nursing note reviewed.   Constitutional:       Appearance: Normal appearance. She is obese.   Cardiovascular:      Rate and Rhythm: Normal rate.   Pulmonary:      Effort: Pulmonary effort is normal.   Skin:     Findings: Abscess present.             Comments: approx 6 cm area of erythema surrounding puncture site with packing  Packing removed, draining copious pus and blood tinged fluid  Site cleaned, applied triple antibiotic ointment and dressing  Wound culture done   Neurological:      Mental Status: She is alert.         Assessment/Plan   Diagnoses and all orders for this visit:    Abdominal wall abscess (Primary)  -     Wound culture; Future      Patient Instructions   Patient presents today with significant skin condition and diagnosis of abdominal wall cellulitis.  Appropriate selection of topical and/or oral medications prescribed in accordance with research guidelines.  Patient educated on proper skin care and management, as well as any follow-up testing recommended. Wound culture done, will call when results available.     "

## 2020-12-18 NOTE — PATIENT INSTRUCTIONS
Patient presents today with significant skin condition and diagnosis of abdominal wall cellulitis.  Appropriate selection of topical and/or oral medications prescribed in accordance with research guidelines.  Patient educated on proper skin care and management, as well as any follow-up testing recommended. Wound culture done, will call when results available.

## 2020-12-22 LAB
BACTERIA SPEC AEROBE CULT: ABNORMAL
BACTERIA SPEC CULT: ABNORMAL

## 2021-01-07 ENCOUNTER — DOCUMENTATION (OUTPATIENT)
Dept: PRIMARY CARE | Facility: CLINIC | Age: 51
End: 2021-01-07

## 2021-01-07 DIAGNOSIS — J30.89 OTHER ALLERGIC RHINITIS: ICD-10-CM

## 2021-01-07 RX ORDER — LORATADINE 10 MG/1
10 TABLET ORAL DAILY
Qty: 30 TABLET | Refills: 3 | Status: SHIPPED | OUTPATIENT
Start: 2021-01-07 | End: 2021-05-03

## 2021-01-07 RX ORDER — ZOLPIDEM TARTRATE 5 MG/1
5 TABLET ORAL NIGHTLY PRN
Qty: 30 TABLET | Refills: 1 | Status: SHIPPED | OUTPATIENT
Start: 2021-01-07 | End: 2021-03-22 | Stop reason: SDUPTHER

## 2021-01-07 NOTE — TELEPHONE ENCOUNTER
Do you have enough medication for the next 5 days?: NO    Did you request this medication through your pharmacy or our patient portal in the last day or two? NO    Name of medication requested: loratadine (CLARITIN)   Medication Strength: 10 mg  Mediation Directions: Take 1 tablet (10 mg total) by mouth daily  Quantity Requested (example 30/90): 30  Number of refills requested: 3      Name of medication requested: zolpidem (AMBIEN)  Medication Strength: 5 mg  Mediation Directions: Take 1 tablet (5 mg total) by mouth nightly as needed for sleep  Quantity Requested (example 30/90): 30  Number of refills requested: 1      System Generated Preferred Pharmacy(s)  are as follows.  If more than one pharmacy displays please identify which one should be used for this call    Has the pharmacy information below been confirmed with the patient?  YES       [facility]/pharmacy #[id number] - [place] - [address]  [address]  [facility] [address]  Phone: [phone] Fax: [phone]          If necessary, provide pharmacy details below.      Is this pharmacy a mail order pharmacy?:   Pharmacy Name:   Pharmacy City:   Pharmacy Telephone:     Additional Comments:    Next Encounter with this provider: Visit date not found

## 2021-01-07 NOTE — PROGRESS NOTES
Kath Singh MA has completed a chart review for Annette Montes De Oca Care Gap Source: [facility]    Care Gap(s) Identified: Diabetic Hemoglobin A1c    Chart Review Completed: Yes      HbA1c was completed on 5/4/20, but was not controlled with a value of 8.2

## 2021-02-09 ENCOUNTER — TELEPHONE (OUTPATIENT)
Dept: PRIMARY CARE | Facility: CLINIC | Age: 51
End: 2021-02-09

## 2021-02-09 NOTE — TELEPHONE ENCOUNTER
Description of Request (Referral/PreAuth/Testing): Referral    Diagnosis/Reason for Request (Include ICD-10 Code if available): Diabetic Eye Exam       Name of Facility or Specialist providing  Service: Dr. Carolina KIMBLEI#: [id number]  Address/Telephone #:   [address] [place] [address]  [address] [facility] [place] [address] [phone]    Date of Appointment: 2/12/2021    Insurance Plan: [employer] [facility]   Insurance ID: [id number]      Additional Comments:       Next Encounter with provider: Visit date not found

## 2021-02-21 ENCOUNTER — TELEPHONE (OUTPATIENT)
Dept: PRIMARY CARE | Facility: CLINIC | Age: 51
End: 2021-02-21

## 2021-02-21 DIAGNOSIS — E10.65 TYPE 1 DIABETES MELLITUS WITH HYPERGLYCEMIA (CMS/HCC): Primary | ICD-10-CM

## 2021-02-21 DIAGNOSIS — E78.5 HYPERLIPIDEMIA, UNSPECIFIED HYPERLIPIDEMIA TYPE: ICD-10-CM

## 2021-02-21 DIAGNOSIS — E03.9 HYPOTHYROIDISM, UNSPECIFIED TYPE: ICD-10-CM

## 2021-02-22 LAB — AMB MLHC EXTERNAL DIABETIC EYE EXAMS: POSITIVE

## 2021-02-22 NOTE — TELEPHONE ENCOUNTER
Pt called back scheduled for office visit 4/13/2021.Pt states last labs were in November . She is not  Scheduled to go back to Endocrinologist until May.Pt is agreeable to get an labs performed that  wants to order.

## 2021-02-22 NOTE — TELEPHONE ENCOUNTER
Please call pt when was last endo followup and labs?     If more than 4 mos - have her get labs done and call endo to make apt     Order tsh t4 hga1c cmp cmp micro alb and lipid    Set up routine follow up with me in next month or two

## 2021-03-11 ENCOUNTER — TELEPHONE (OUTPATIENT)
Dept: PRIMARY CARE | Facility: CLINIC | Age: 51
End: 2021-03-11

## 2021-03-22 ENCOUNTER — TELEPHONE (OUTPATIENT)
Dept: PRIMARY CARE | Facility: CLINIC | Age: 51
End: 2021-03-22

## 2021-03-22 RX ORDER — ZOLPIDEM TARTRATE 5 MG/1
5 TABLET ORAL NIGHTLY PRN
Qty: 30 TABLET | Refills: 1 | Status: SHIPPED | OUTPATIENT
Start: 2021-03-22 | End: 2021-05-28 | Stop reason: SDUPTHER

## 2021-03-22 NOTE — TELEPHONE ENCOUNTER
Pt requests refill on Zolpidem    Do you have enough medication for the next 5 days?:     Did you request this medication through your pharmacy or our patient portal in the last day or two?     Name of medication requested: zolpidem (AMBIEN)  Medication Strength: 5  mg  Mediation Directions:   Quantity Requested (example 30/90):  Number of refills requested:      System Generated Preferred Pharmacy(s)  are as follows.  If more than one pharmacy displays please identify which one should be used for this call    Has the pharmacy information below been confirmed with the patient?         [facility]/pharmacy #[id number] - [place] - [address]  [address] [place] [address]  Phone: [phone] Fax: [phone]          If necessary, provide pharmacy details below     Is this pharmacy a mail order pharmacy?:   Pharmacy Name:   Pharmacy City:   Pharmacy Telephone:     Additional Comments:    Next Encounter with this provider: 4/6/2021

## 2021-03-22 NOTE — TELEPHONE ENCOUNTER
Medicine Refill Request    Last Office Visit: 12/18/2020  Last Telemedicine Visit: Visit date not found    Next Office Visit: 4/6/2021  Next Telemedicine Visit: Visit date not found         Current Outpatient Medications:   •  atorvastatin (LIPITOR) 10 mg tablet, Take 1 tablet (10 mg total) by mouth daily., Disp: 30 tablet, Rfl: 3  •  azelastine 0.15 % (205.5 mcg) nasal spray, ADMINISTER 1 SPRAY INTO EACH NOSTRIL 2 TIMES A DAY AS NEEDED FOR RHINITIS. (Patient not taking: ADMINISTER 1 SPRAY INTO EACH NOSTRIL 2 TIMES A DAY AS NEEDED FOR RHINITIS.), Disp: 30 mL, Rfl: 4  •  buPROPion XL (WELLBUTRIN XL) 150 mg 24 hr tablet, Take 1 tablet (150 mg total) by mouth once daily., Disp: 30 tablet, Rfl: 5  •  CLINPRO 5000 1.1 % paste, BRUSH TEETH FOR 1 MINUTE TWICE A DAY DO NOT RINSE, EAT OR DRINK FOR 30 MINS AFTER, Disp: , Rfl:   •  fluticasone propionate (FLONASE) 50 mcg/actuation nasal spray, Administer 1 spray into each nostril daily., Disp: , Rfl:   •  insulin regular (NovoLIN R) 100 unit/mL injection, inject by sliding scale with meals (according to carbohydrate scale), Disp: , Rfl:   •  levothyroxine (SYNTHROID) 75 mcg tablet, 1 TABLET ON AN EMPTY STOMACH IN THE MORNING, Disp: , Rfl: 3  •  loratadine (CLARITIN) 10 mg tablet, Take 1 tablet (10 mg total) by mouth daily., Disp: 30 tablet, Rfl: 3  •  montelukast (SINGULAIR) 10 mg tablet, TAKE 1 TABLET BY MOUTH EVERY DAY AT NIGHT, Disp: 30 tablet, Rfl: 5  •  norgestrel-ethinyl estradiol (CRYSELLE, 28,) 0.3-30 mg-mcg per tablet, take 1 tablet by oral route  every day, Disp: , Rfl:   •  NovoLIN N NPH U-100 Insulin 100 unit/mL injection, INJECT 40 UNITS SUBCUTANEOUS DAILY AT BEDTIME, Disp: , Rfl:   •  PARoxetine (PAXIL) 40 mg tablet, Take 1 tablet (40 mg total) by mouth once daily., Disp: 30 tablet, Rfl: 5  •  zolpidem (AMBIEN) 5 mg tablet, Take 1 tablet (5 mg total) by mouth nightly as needed for sleep., Disp: 30 tablet, Rfl: 1      BP Readings from Last 3 Encounters:   12/18/20  130/80   12/15/20 128/70   10/06/20 132/85       Recent Lab results:  Lab Results   Component Value Date    CHOL 149 05/04/2020   ,   Lab Results   Component Value Date    HDL 44 05/04/2020   ,   Lab Results   Component Value Date    LDLCALC 87 05/04/2020   ,   Lab Results   Component Value Date    TRIG 89 05/04/2020        Lab Results   Component Value Date    GLUCOSE 143 (H) 05/04/2020   ,   Lab Results   Component Value Date    HGBA1C 8.2 (H) 05/04/2020         Lab Results   Component Value Date    CREATININE 0.82 05/04/2020       Lab Results   Component Value Date    TSH 4.330 05/04/2020

## 2021-03-24 LAB
ALBUMIN SERPL-MCNC: 4 G/DL (ref 3.8–4.9)
ALBUMIN/CREAT UR: 7 MG/G CREAT (ref 0–29)
ALBUMIN/GLOB SERPL: 1.6 {RATIO} (ref 1.2–2.2)
ALP SERPL-CCNC: 87 IU/L (ref 39–117)
ALT SERPL-CCNC: 7 IU/L (ref 0–32)
AST SERPL-CCNC: 11 IU/L (ref 0–40)
BILIRUB SERPL-MCNC: 0.4 MG/DL (ref 0–1.2)
BUN SERPL-MCNC: 8 MG/DL (ref 6–24)
BUN/CREAT SERPL: 10 (ref 9–23)
CALCIUM SERPL-MCNC: 9.2 MG/DL (ref 8.7–10.2)
CHLORIDE SERPL-SCNC: 105 MMOL/L (ref 96–106)
CHOLEST SERPL-MCNC: 125 MG/DL (ref 100–199)
CO2 SERPL-SCNC: 26 MMOL/L (ref 20–29)
CREAT SERPL-MCNC: 0.82 MG/DL (ref 0.57–1)
CREAT UR-MCNC: 232.8 MG/DL
GLOBULIN SER CALC-MCNC: 2.5 G/DL (ref 1.5–4.5)
GLUCOSE SERPL-MCNC: 90 MG/DL (ref 65–99)
HBA1C MFR BLD: 8.2 % (ref 4.8–5.6)
HDLC SERPL-MCNC: 43 MG/DL
LAB CORP EGFR IF AFRICN AM: 95 ML/MIN/1.73
LAB CORP EGFR IF NONAFRICN AM: 83 ML/MIN/1.73
LDLC SERPL CALC-MCNC: 68 MG/DL (ref 0–99)
MICROALBUMIN UR-MCNC: 16.9 UG/ML
POTASSIUM SERPL-SCNC: 4.3 MMOL/L (ref 3.5–5.2)
PROT SERPL-MCNC: 6.5 G/DL (ref 6–8.5)
SODIUM SERPL-SCNC: 141 MMOL/L (ref 134–144)
T4 FREE SERPL-MCNC: 1.49 NG/DL (ref 0.82–1.77)
TRIGL SERPL-MCNC: 68 MG/DL (ref 0–149)
TSH SERPL DL<=0.005 MIU/L-ACNC: 2.06 UIU/ML (ref 0.45–4.5)
VLDLC SERPL CALC-MCNC: 14 MG/DL (ref 5–40)

## 2021-03-30 ENCOUNTER — IMMUNIZATION (OUTPATIENT)
Dept: IMMUNIZATION | Facility: CLINIC | Age: 51
End: 2021-03-30

## 2021-04-06 ENCOUNTER — CONSULT (OUTPATIENT)
Dept: PRIMARY CARE | Facility: CLINIC | Age: 51
End: 2021-04-06
Payer: COMMERCIAL

## 2021-04-06 VITALS
BODY MASS INDEX: 34.02 KG/M2 | WEIGHT: 192 LBS | TEMPERATURE: 99.8 F | OXYGEN SATURATION: 97 % | HEIGHT: 63 IN | SYSTOLIC BLOOD PRESSURE: 140 MMHG | RESPIRATION RATE: 16 BRPM | DIASTOLIC BLOOD PRESSURE: 80 MMHG | HEART RATE: 95 BPM

## 2021-04-06 DIAGNOSIS — F41.9 ANXIETY: ICD-10-CM

## 2021-04-06 DIAGNOSIS — E03.9 ACQUIRED HYPOTHYROIDISM: ICD-10-CM

## 2021-04-06 DIAGNOSIS — E10.3553 STABLE PROLIFERATIVE DIABETIC RETINOPATHY OF BOTH EYES ASSOCIATED WITH TYPE 1 DIABETES MELLITUS (CMS/HCC): ICD-10-CM

## 2021-04-06 DIAGNOSIS — E10.65 TYPE 1 DIABETES MELLITUS WITH HYPERGLYCEMIA (CMS/HCC): ICD-10-CM

## 2021-04-06 DIAGNOSIS — Z01.818 PRE-OP EXAM: Primary | ICD-10-CM

## 2021-04-06 PROCEDURE — 99214 OFFICE O/P EST MOD 30 MIN: CPT | Performed by: FAMILY MEDICINE

## 2021-04-06 PROCEDURE — 3008F BODY MASS INDEX DOCD: CPT | Performed by: FAMILY MEDICINE

## 2021-04-06 PROCEDURE — 93000 ELECTROCARDIOGRAM COMPLETE: CPT | Performed by: FAMILY MEDICINE

## 2021-04-06 NOTE — PROGRESS NOTES
Subjective      Patient ID: Annette Montes De Oca is a 51 y.o. female.      HPI    The following have been reviewed and updated as appropriate in this visit:  Allergies  Meds  Problems        Pt presents for preoperative evaluation at request of   Dr. Ruben hayward [facility] meeting  4/19     She has hx of type 1 dm diabetic retinopathy obesity hypothyroidism and anxiety     Covid vaccine had 1st 3/29 and scheduled 2nd 4/20      Endocrinologist  scheduled 5/15  omnipod   Dr Silvia piña [place]     Abdominal wall absess has cleared     Lab Results   Component Value Date    WBC 5.6 10/06/2017    HGB 14.3 10/06/2017    HCT 41.9 10/06/2017     (H) 10/06/2017    CHOL 125 03/23/2021    TRIG 68 03/23/2021    HDL 43 03/23/2021    ALT 7 03/23/2021    AST 11 03/23/2021     03/23/2021    K 4.3 03/23/2021     03/23/2021    CREATININE 0.82 03/23/2021    BUN 8 03/23/2021    CO2 26 03/23/2021    TSH 2.060 03/23/2021    HGBA1C 8.2 (H) 03/23/2021    MICROALBUR 16.9 03/23/2021           Review of Systems   Constitutional: Negative for activity change and fatigue.   HENT: Negative for congestion.    Respiratory: Negative for chest tightness.    Cardiovascular: Negative for chest pain and palpitations.   Gastrointestinal: Negative for abdominal pain and blood in stool.   Genitourinary: Negative for difficulty urinating.   Musculoskeletal: Negative for gait problem.   Skin: Negative for rash.   Neurological: Negative for headaches.   Psychiatric/Behavioral: Negative for confusion.         Current Outpatient Medications:   •  atorvastatin (LIPITOR) 10 mg tablet, Take 1 tablet (10 mg total) by mouth daily., Disp: 30 tablet, Rfl: 3  •  azelastine 0.15 % (205.5 mcg) nasal spray, ADMINISTER 1 SPRAY INTO EACH NOSTRIL 2 TIMES A DAY AS NEEDED FOR RHINITIS., Disp: 30 mL, Rfl: 4  •  buPROPion XL (WELLBUTRIN XL) 150 mg 24 hr tablet, TAKE 1 TABLET BY MOUTH EVERY DAY, Disp: 90 tablet, Rfl: 0  •  CLINPRO 5000 1.1 %  "paste, BRUSH TEETH FOR 1 MINUTE TWICE A DAY DO NOT RINSE, EAT OR DRINK FOR 30 MINS AFTER, Disp: , Rfl:   •  fluticasone propionate (FLONASE) 50 mcg/actuation nasal spray, Administer 1 spray into each nostril daily., Disp: , Rfl:   •  insulin regular (NovoLIN R) 100 unit/mL injection, inject by sliding scale with meals (according to carbohydrate scale), Disp: , Rfl:   •  levothyroxine (SYNTHROID) 75 mcg tablet, 1 TABLET ON AN EMPTY STOMACH IN THE MORNING, Disp: , Rfl: 3  •  loratadine (CLARITIN) 10 mg tablet, Take 1 tablet (10 mg total) by mouth daily., Disp: 30 tablet, Rfl: 3  •  montelukast (SINGULAIR) 10 mg tablet, TAKE 1 TABLET BY MOUTH EVERY DAY AT NIGHT, Disp: 30 tablet, Rfl: 5  •  norgestrel-ethinyl estradiol (CRYSELLE, 28,) 0.3-30 mg-mcg per tablet, take 1 tablet by oral route  every day, Disp: , Rfl:   •  NovoLIN N NPH U-100 Insulin 100 unit/mL injection, INJECT 40 UNITS SUBCUTANEOUS DAILY AT BEDTIME, Disp: , Rfl:   •  PARoxetine (PAXIL) 40 mg tablet, TAKE 1 TABLET BY MOUTH EVERY DAY, Disp: 90 tablet, Rfl: 0  •  zolpidem (AMBIEN) 5 mg tablet, Take 1 tablet (5 mg total) by mouth nightly as needed for sleep., Disp: 30 tablet, Rfl: 1    Past Medical History:   Diagnosis Date   • Diabetes mellitus type I (CMS/HCC)    • Disease of thyroid gland      Past Surgical History:   Procedure Laterality Date   • CARPAL TUNNEL RELEASE     • FOOT SURGERY     • HERNIA REPAIR     • MOUTH SURGERY     • OOPHORECTOMY     • SINUS SURGERY     • WISDOM TOOTH EXTRACTION       Objective     Vitals:    04/06/21 1317   BP: 140/80   BP Location: Right upper arm   Patient Position: Sitting   Pulse: 95   Resp: 16   Temp: 37.7 °C (99.8 °F)   TempSrc: Oral   SpO2: 97%   Weight: 87.1 kg (192 lb)   Height: 1.6 m (5' 3\")       Body mass index is 34.01 kg/m².    Physical Exam  Constitutional:       Appearance: She is well-developed. She is obese.   HENT:      Head: Normocephalic and atraumatic.      Right Ear: Tympanic membrane, ear canal and " external ear normal.      Left Ear: Tympanic membrane, ear canal and external ear normal.      Nose: Nose normal.   Eyes:      Conjunctiva/sclera: Conjunctivae normal.   Neck:      Musculoskeletal: Normal range of motion and neck supple.      Thyroid: No thyromegaly.   Cardiovascular:      Rate and Rhythm: Normal rate and regular rhythm.      Heart sounds: Normal heart sounds. No murmur. No friction rub. No gallop.    Pulmonary:      Effort: Pulmonary effort is normal. No respiratory distress.      Breath sounds: Normal breath sounds. No wheezing or rales.   Abdominal:      General: Abdomen is flat. Bowel sounds are normal.      Palpations: Abdomen is soft. There is no mass.      Tenderness: There is no abdominal tenderness.   Musculoskeletal:      Right lower leg: No edema.      Left lower leg: No edema.   Lymphadenopathy:      Cervical: No cervical adenopathy.   Skin:     General: Skin is warm and dry.   Neurological:      General: No focal deficit present.      Mental Status: She is alert and oriented to person, place, and time.   Psychiatric:         Behavior: Behavior normal.         Thought Content: Thought content normal.         Judgment: Judgment normal.         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    Ruba Beavers MD

## 2021-04-07 PROBLEM — E10.3553: Status: ACTIVE | Noted: 2021-04-07

## 2021-04-07 PROBLEM — E10.65 TYPE 1 DIABETES MELLITUS WITH HYPERGLYCEMIA (CMS/HCC): Status: ACTIVE | Noted: 2021-04-07

## 2021-04-07 ASSESSMENT — ENCOUNTER SYMPTOMS
ACTIVITY CHANGE: 0
ABDOMINAL PAIN: 0
CHEST TIGHTNESS: 0
PALPITATIONS: 0
FATIGUE: 0
CONFUSION: 0
HEADACHES: 0
DIFFICULTY URINATING: 0
BLOOD IN STOOL: 0

## 2021-04-09 ENCOUNTER — TELEPHONE (OUTPATIENT)
Dept: PRIMARY CARE | Facility: CLINIC | Age: 51
End: 2021-04-09

## 2021-04-09 NOTE — TELEPHONE ENCOUNTER
Referral #: [id number]   Effective: 04/09/2021   Expires: 07/07/2021     Left a voice message to inform patient about it.

## 2021-04-09 NOTE — TELEPHONE ENCOUNTER
Description of Request (Referral/PreAuth/Testing): Referral    Diagnosis/Reason for Request (Include ICD-10 Code if available): H53.149      Name of Facility or Specialist providing  Service: Dr. Miranda  NPI#: [id number]  Address/Telephone #:   [address]  [facility] [address]   [place] [address] [phone]      Date of Appointment: 4/13/2021     Insurance Plan: [employer]   Insurance ID: [id number]      Additional Comments:       Next Encounter with provider: 7/6/2021

## 2021-04-13 ENCOUNTER — TELEPHONE (OUTPATIENT)
Dept: PRIMARY CARE | Facility: CLINIC | Age: 51
End: 2021-04-13

## 2021-04-14 ENCOUNTER — TELEPHONE (OUTPATIENT)
Dept: PRIMARY CARE | Facility: CLINIC | Age: 51
End: 2021-04-14

## 2021-04-14 NOTE — TELEPHONE ENCOUNTER
Description of Request (Referral/PreAuth/Testing): Referral    Diagnosis/Reason for Request (Include ICD-10 Code if available): H25.12      Name of Facility or Specialist providing  Service: [facility]   NPI#:[id number]  Address/Telephone #: [phone]    Date of Appointment: 04/19    Insurance Plan: [employer]BC - [employer]  Insurance ID: [id number]      Additional Comments:       Next Encounter with provider: 7/6/2021

## 2021-04-14 NOTE — TELEPHONE ENCOUNTER
marg[facility]ita [facility] [phone] , pt [facility] surgery Monday 4/16, a preop form was faxed yesterday and they need it returned no later than tomorrow morning.  She is requesting a call back.

## 2021-04-20 ENCOUNTER — IMMUNIZATION (OUTPATIENT)
Dept: IMMUNIZATION | Facility: CLINIC | Age: 51
End: 2021-04-20
Attending: FAMILY MEDICINE

## 2021-05-03 DIAGNOSIS — J30.89 OTHER ALLERGIC RHINITIS: ICD-10-CM

## 2021-05-03 LAB — AMB MLHC EXTERNAL DIABETIC EYE EXAMS: POSITIVE

## 2021-05-03 RX ORDER — LORATADINE 10 MG/1
TABLET ORAL
Qty: 30 TABLET | Refills: 3 | Status: SHIPPED | OUTPATIENT
Start: 2021-05-03 | End: 2021-08-30

## 2021-05-05 ENCOUNTER — TELEPHONE (OUTPATIENT)
Dept: PRIMARY CARE | Facility: CLINIC | Age: 51
End: 2021-05-05

## 2021-05-05 NOTE — TELEPHONE ENCOUNTER
Description of Request (Referral/PreAuth/Testing Referral     Diagnosis/Reason for Request (Include ICD-10 Code if available)  No Code Provided     diabetic Check up      Name of Facility or Specialist providing     [facility]  Dr Darrell Vega  [address] [place], [place] [address]     Phone    Fax   [phone]      NPI#: [id number]       Date of Appointment: 5.14.21    Insurance Plan: [employer] [facility]   Insurance ID:[id number]      Additional Comments:       Next Encounter with provider: 7/6/2021

## 2021-05-17 RX ORDER — MONTELUKAST SODIUM 10 MG/1
TABLET ORAL
Qty: 30 TABLET | Refills: 5 | Status: SHIPPED | OUTPATIENT
Start: 2021-05-17 | End: 2021-11-17

## 2021-05-17 RX ORDER — ATORVASTATIN CALCIUM 10 MG/1
TABLET, FILM COATED ORAL
Qty: 30 TABLET | Refills: 3 | Status: SHIPPED | OUTPATIENT
Start: 2021-05-17 | End: 2021-09-15

## 2021-05-18 ENCOUNTER — TELEPHONE (OUTPATIENT)
Dept: PRIMARY CARE | Facility: CLINIC | Age: 51
End: 2021-05-18

## 2021-05-18 NOTE — TELEPHONE ENCOUNTER
Caller called for referral explained I  need NPI# she will call them an if still cant get through she will leave a message

## 2021-05-28 NOTE — TELEPHONE ENCOUNTER
Do you have enough medication for the next 5 days?: no    Did you request this medication through your pharmacy or our patient portal in the last day or two? no    Name of medication requested: zolpidem (AMBIEN) 5 mg tablet  Medication Strength: 5mg  Mediation Directions: ON PRN   Quantity Requested (example 30/90): 30  Number of refills requested:       [facility]/pharmacy #[id number] - [place] - [address]  [address] [place] [address]  Phone: [phone] Fax: [phone]    Additional Comments:    Next Encounter with this provider: 7/6/2021

## 2021-05-29 RX ORDER — ZOLPIDEM TARTRATE 5 MG/1
5 TABLET ORAL NIGHTLY PRN
Qty: 30 TABLET | Refills: 1 | Status: SHIPPED | OUTPATIENT
Start: 2021-05-29 | End: 2021-08-04 | Stop reason: SDUPTHER

## 2021-06-28 RX ORDER — BUPROPION HYDROCHLORIDE 150 MG/1
TABLET ORAL
Qty: 90 TABLET | Refills: 0 | Status: SHIPPED | OUTPATIENT
Start: 2021-06-28 | End: 2021-10-01

## 2021-06-28 RX ORDER — PAROXETINE HYDROCHLORIDE 40 MG/1
40 TABLET, FILM COATED ORAL
Qty: 90 TABLET | Refills: 0 | Status: SHIPPED | OUTPATIENT
Start: 2021-06-28 | End: 2021-09-22 | Stop reason: SDUPTHER

## 2021-08-04 NOTE — TELEPHONE ENCOUNTER
Do you have enough medication for the next 5 days?: no    Did you request this medication through your pharmacy or our patient portal in the last day or two? no    Name of medication requested: zolpidem (AMBIEN) 5 mg tablet     Medication Strength:   Mediation Directions:   Quantity Requested (example 30/90):   Number of refills requested:       System Generated Preferred Pharmacy(s)  are as follows.  If more than one pharmacy displays please identify which one should be used for this call    Has the pharmacy information below been confirmed with the patient?    yes     [facility]/pharmacy #[id number] - [place] - [address]  [address] [address]  Phone: [phone] Fax: [phone]          If necessary, provide pharmacy details below.     Is this pharmacy a mail order pharmacy?:   Pharmacy Name:   Pharmacy City:   Pharmacy Telephone:     Additional Comments:    Next Encounter with this provider: 8/31/2021

## 2021-08-05 RX ORDER — ZOLPIDEM TARTRATE 5 MG/1
5 TABLET ORAL NIGHTLY PRN
Qty: 30 TABLET | Refills: 4 | Status: SHIPPED | OUTPATIENT
Start: 2021-08-05 | End: 2021-11-17 | Stop reason: SDUPTHER

## 2021-08-30 DIAGNOSIS — J30.89 OTHER ALLERGIC RHINITIS: ICD-10-CM

## 2021-08-30 RX ORDER — LORATADINE 10 MG/1
TABLET ORAL
Qty: 30 TABLET | Refills: 3 | Status: SHIPPED | OUTPATIENT
Start: 2021-08-30 | End: 2022-04-15

## 2021-09-11 LAB
CREAT SERPL-MCNC: 82.1 MG/DL
EXTERNAL MICROALBUMIN/CREATININE URINE: 7
MICROALBUMIN UR-MCNC: 5.4 MG/L

## 2021-09-15 ENCOUNTER — TELEPHONE (OUTPATIENT)
Dept: PRIMARY CARE | Facility: CLINIC | Age: 51
End: 2021-09-15

## 2021-09-15 NOTE — TELEPHONE ENCOUNTER
Do you have enough medication for the next 5 days?: no     Did you request this medication through your pharmacy or our patient portal in the last day or two? No     Name of medication requested: zolpidem (AMBIEN) 5 mg tablet  Medication Strength: 5mg   Mediation Directions:   Quantity Requested (example 30/90): 30  Number of refills requested:     [facility]/pharmacy #[id number] - [place] - [address]  [address] [address]  Phone: [phone] Fax: [phone]    Additional Comments:    Next Encounter with this provider: 11/19/2021

## 2021-09-22 ENCOUNTER — TELEPHONE (OUTPATIENT)
Dept: PRIMARY CARE | Facility: CLINIC | Age: 51
End: 2021-09-22

## 2021-09-22 RX ORDER — PAROXETINE HYDROCHLORIDE 40 MG/1
40 TABLET, FILM COATED ORAL
Qty: 90 TABLET | Refills: 0 | Status: SHIPPED | OUTPATIENT
Start: 2021-09-22 | End: 2021-11-19 | Stop reason: SDUPTHER

## 2021-09-22 NOTE — TELEPHONE ENCOUNTER
Do you have enough medication for the next 5 days?: No  Did you request this medication through your pharmacy or our patient portal in the last day or two? No    Name of medication requested: PARoxetine (PAXIL)      Medication Strength: 40mg  Mediation Directions: Take 1 tablet (40 mg total) by mouth once daily.  Quantity Requested (example 30/90): 90  Number of refills requested: 2      System Generated Preferred Pharmacy(s)  are as follows.  If more than one pharmacy displays please identify which one should be used for this call    Has the pharmacy information below been confirmed with the patient?         [facility]/pharmacy #[id number] - [place] - [address]  [address]  [facility] [address]  Phone: [phone] Fax: [phone]          If necessary, provide pharmacy details below.     Is this pharmacy a mail order pharmacy?:   Pharmacy Name:   Pharmacy City:   Pharmacy Telephone:     Additional Comments:    Next Encounter with this provider: 11/19/2021

## 2021-10-01 RX ORDER — BUPROPION HYDROCHLORIDE 150 MG/1
TABLET ORAL
Qty: 30 TABLET | Refills: 2 | Status: SHIPPED | OUTPATIENT
Start: 2021-10-01 | End: 2021-11-19 | Stop reason: SDUPTHER

## 2021-11-17 ENCOUNTER — TELEPHONE (OUTPATIENT)
Dept: PRIMARY CARE | Facility: CLINIC | Age: 51
End: 2021-11-17

## 2021-11-17 RX ORDER — ZOLPIDEM TARTRATE 5 MG/1
5 TABLET ORAL NIGHTLY PRN
Qty: 30 TABLET | Refills: 0 | Status: SHIPPED | OUTPATIENT
Start: 2021-11-17 | End: 2022-06-15

## 2021-11-17 RX ORDER — MONTELUKAST SODIUM 10 MG/1
TABLET ORAL
Qty: 30 TABLET | Refills: 5 | Status: SHIPPED | OUTPATIENT
Start: 2021-11-17 | End: 2022-05-17

## 2021-11-17 NOTE — TELEPHONE ENCOUNTER
Last OV 4/6/21 pre op multiple canceled appointments  Scheduled for 11/19/21  Filled on 10/16/21  #30  Pdmp Checked with No Red Flags

## 2021-11-17 NOTE — TELEPHONE ENCOUNTER
Do you have enough medication for the next 5 days?: no    Did you request this medication through your pharmacy or our patient portal in the last day or two? no    Name of medication requested: zolpidem (AMBIEN) 5 mg tablet       Medication Strength:   Mediation Directions:   Quantity Requested (example 30/90):  Number of refills requested:       System Generated Preferred Pharmacy(s)  are as follows.  If more than one pharmacy displays please identify which one should be used for this call    Has the pharmacy information below been confirmed with the patient?    yes     [id number] [place] - [address]  [address] [address]  Phone: [phone] Fax: [phone]          If necessary, provide pharmacy details below.     Is this pharmacy a mail order pharmacy?:   Pharmacy Name:   Pharmacy City:   Pharmacy Telephone:     Additional Comments:    Next Encounter with this provider: 11/19/2021

## 2021-11-19 ENCOUNTER — OFFICE VISIT (OUTPATIENT)
Dept: PRIMARY CARE | Facility: CLINIC | Age: 51
End: 2021-11-19
Payer: COMMERCIAL

## 2021-11-19 VITALS
OXYGEN SATURATION: 98 % | HEART RATE: 100 BPM | RESPIRATION RATE: 16 BRPM | SYSTOLIC BLOOD PRESSURE: 126 MMHG | DIASTOLIC BLOOD PRESSURE: 86 MMHG | BODY MASS INDEX: 34.01 KG/M2 | TEMPERATURE: 97 F | HEIGHT: 63 IN

## 2021-11-19 DIAGNOSIS — E10.3553 STABLE PROLIFERATIVE DIABETIC RETINOPATHY OF BOTH EYES ASSOCIATED WITH TYPE 1 DIABETES MELLITUS (CMS/HCC): ICD-10-CM

## 2021-11-19 DIAGNOSIS — E03.9 ACQUIRED HYPOTHYROIDISM: ICD-10-CM

## 2021-11-19 DIAGNOSIS — E10.65 TYPE 1 DIABETES MELLITUS WITH HYPERGLYCEMIA (CMS/HCC): ICD-10-CM

## 2021-11-19 DIAGNOSIS — J06.9 UPPER RESPIRATORY TRACT INFECTION, UNSPECIFIED TYPE: Primary | ICD-10-CM

## 2021-11-19 PROCEDURE — 99214 OFFICE O/P EST MOD 30 MIN: CPT | Performed by: FAMILY MEDICINE

## 2021-11-19 PROCEDURE — 3008F BODY MASS INDEX DOCD: CPT | Performed by: FAMILY MEDICINE

## 2021-11-19 RX ORDER — BUPROPION HYDROCHLORIDE 150 MG/1
150 TABLET ORAL
Qty: 30 TABLET | Refills: 6 | Status: SHIPPED | OUTPATIENT
Start: 2021-11-19 | End: 2022-08-24

## 2021-11-19 RX ORDER — PAROXETINE HYDROCHLORIDE 40 MG/1
40 TABLET, FILM COATED ORAL
Qty: 90 TABLET | Refills: 6 | Status: SHIPPED | OUTPATIENT
Start: 2021-11-19 | End: 2022-12-12

## 2021-11-19 RX ORDER — ATORVASTATIN CALCIUM 10 MG/1
10 TABLET, FILM COATED ORAL
Qty: 30 TABLET | Refills: 6 | Status: SHIPPED | OUTPATIENT
Start: 2021-11-19 | End: 2022-06-16

## 2021-11-19 NOTE — PROGRESS NOTES
Subjective      Patient ID: Annette Montes De Oca is a 51 y.o. female.      HPI    The following have been reviewed and updated as appropriate in this visit:  Allergies  Meds  Problems        PT with T1IDDM    Had stomach virus few days  Better now     Has had covid vaccine x 3 doses and flu vac and first shingrix     Endo 9/2021  hga1c 8.4  LDL 61 TG 82  TSH nl  micor 7  dexcom meter and then working on getting 3 mos readings  New pump       45 n at night   1/10 lunch dinner      Getting colon - scheduled    Had mammogram done this yr  Gyn Dr Simón Linder      Lab Results   Component Value Date    WBC 5.6 10/06/2017    HGB 14.3 10/06/2017    HCT 41.9 10/06/2017     (H) 10/06/2017    CHOL 125 03/23/2021    TRIG 68 03/23/2021    HDL 43 03/23/2021    ALT 7 03/23/2021    AST 11 03/23/2021     03/23/2021    K 4.3 03/23/2021     03/23/2021    CREATININE 0.82 03/23/2021    BUN 8 03/23/2021    CO2 26 03/23/2021    TSH 2.060 03/23/2021    HGBA1C 8.2 (H) 03/23/2021    MICROALBUR 16.9 03/23/2021    LDLCALC 68 03/23/2021     Review of Systems      Current Outpatient Medications:   •  atorvastatin 10 mg tablet, Take 1 tablet (10 mg total) by mouth once daily., Disp: 30 tablet, Rfl: 6  •  azelastine 0.15 % (205.5 mcg) nasal spray, ADMINISTER 1 SPRAY INTO EACH NOSTRIL 2 TIMES A DAY AS NEEDED FOR RHINITIS., Disp: 30 mL, Rfl: 4  •  buPROPion  mg 24 hr tablet, Take 1 tablet (150 mg total) by mouth once daily., Disp: 30 tablet, Rfl: 6  •  CLINPRO 5000 1.1 % paste, BRUSH TEETH FOR 1 MINUTE TWICE A DAY DO NOT RINSE, EAT OR DRINK FOR 30 MINS AFTER, Disp: , Rfl:   •  fluticasone propionate (FLONASE) 50 mcg/actuation nasal spray, Administer 1 spray into each nostril daily., Disp: , Rfl:   •  levothyroxine (SYNTHROID) 75 mcg tablet, 1 TABLET ON AN EMPTY STOMACH IN THE MORNING, Disp: , Rfl: 3  •  loratadine (CLARITIN) 10 mg tablet, TAKE 1 TABLET BY MOUTH EVERY DAY, Disp: 30 tablet, Rfl: 3  •  montelukast 10 mg tablet,  "TAKE 1 TABLET BY MOUTH EVERY DAY AT NIGHT, Disp: 30 tablet, Rfl: 5  •  norgestrel-ethinyl estradiol (CRYSELLE, 28,) 0.3-30 mg-mcg per tablet, take 1 tablet by oral route  every day, Disp: , Rfl:   •  NovoLIN N NPH U-100 Insulin 100 unit/mL injection, INJECT 40 UNITS SUBCUTANEOUS DAILY AT BEDTIME, Disp: , Rfl:   •  PARoxetine 40 mg tablet, Take 1 tablet (40 mg total) by mouth once daily., Disp: 90 tablet, Rfl: 6  •  zolpidem 5 mg tablet, Take 1 tablet (5 mg total) by mouth nightly as needed for sleep., Disp: 30 tablet, Rfl: 0  •  insulin regular (NovoLIN R) 100 unit/mL injection, inject by sliding scale with meals (according to carbohydrate scale), Disp: , Rfl:     Past Medical History:   Diagnosis Date   • Diabetes mellitus type I (CMS/HCC)    • Disease of thyroid gland      Past Surgical History:   Procedure Laterality Date   • CARPAL TUNNEL RELEASE     • FOOT SURGERY     • HERNIA REPAIR     • MOUTH SURGERY     • OOPHORECTOMY     • SINUS SURGERY     • WISDOM TOOTH EXTRACTION       Objective     Vitals:    11/19/21 1437   BP: 126/86   BP Location: Left upper arm   Patient Position: Sitting   Pulse: 100   Resp: 16   Temp: 36.1 °C (97 °F)   TempSrc: Temporal   SpO2: 98%   Height: 1.6 m (5' 3\")       Body mass index is 34.01 kg/m².    Physical Exam  Constitutional:       Appearance: She is well-developed.   HENT:      Head: Normocephalic and atraumatic.      Right Ear: Tympanic membrane, ear canal and external ear normal.      Left Ear: Tympanic membrane, ear canal and external ear normal.   Neck:      Thyroid: No thyromegaly.   Cardiovascular:      Rate and Rhythm: Normal rate and regular rhythm.      Heart sounds: Normal heart sounds. No murmur heard.  No friction rub. No gallop.    Pulmonary:      Effort: Pulmonary effort is normal. No respiratory distress.      Breath sounds: Normal breath sounds. No wheezing or rales.   Musculoskeletal:      Cervical back: Normal range of motion and neck supple.      Right lower " leg: No edema.      Left lower leg: No edema.   Feet:      Right foot:      Protective Sensation: 10 sites tested. 10 sites sensed.      Left foot:      Protective Sensation: 10 sites tested. 10 sites sensed.   Lymphadenopathy:      Cervical: No cervical adenopathy.   Neurological:      Mental Status: She is alert and oriented to person, place, and time.   Psychiatric:         Mood and Affect: Mood normal.         Behavior: Behavior normal.         Thought Content: Thought content normal.         Judgment: Judgment normal.         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 1 diabetes mellitus (CMS/[facility])       Endocrine/Metabolic    Hypothyroid    Type 1 diabetes mellitus with hyperglycemia (CMS/[facility])      Other Visit Diagnoses     Upper respiratory tract infection, unspecified type    -  Primary    Relevant Orders    COVID-19 [facility] ([facility])      uri sc cough no fever GI sx resolving - call if sx persist     Dm - she is working with endo on getting insulin pump   BG not at goal    followup with  ophtho     Thyroid controlled    Patient Instructions   Colonoscopy     Follow up with endocrinologist       Ruba Beavers MD

## 2021-11-20 ENCOUNTER — TELEPHONE (OUTPATIENT)
Dept: PRIMARY CARE | Facility: CLINIC | Age: 51
End: 2021-11-20

## 2021-11-20 NOTE — TELEPHONE ENCOUNTER
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

## 2021-11-22 NOTE — TELEPHONE ENCOUNTER
Talked to patient. Patient doing much better. Denies nausea/vomiting/diarrhea. Patient eating and drinking and tolerating with no symptoms. Blood sugars stable in the 100s.

## 2021-11-25 LAB — SARS-COV-2 RNA RESP QL NAA+PROBE: NOT DETECTED

## 2021-12-14 LAB — GFR SERPL CREATININE-BSD FRML MDRD: 56 ML/MIN/1.73M*2

## 2022-01-13 ENCOUNTER — OFFICE VISIT (OUTPATIENT)
Dept: PRIMARY CARE | Facility: CLINIC | Age: 52
End: 2022-01-13
Payer: COMMERCIAL

## 2022-01-13 VITALS
OXYGEN SATURATION: 99 % | RESPIRATION RATE: 16 BRPM | HEART RATE: 89 BPM | HEIGHT: 63 IN | TEMPERATURE: 97.3 F | WEIGHT: 190 LBS | DIASTOLIC BLOOD PRESSURE: 88 MMHG | BODY MASS INDEX: 33.66 KG/M2 | SYSTOLIC BLOOD PRESSURE: 136 MMHG

## 2022-01-13 DIAGNOSIS — R53.83 FATIGUE, UNSPECIFIED TYPE: ICD-10-CM

## 2022-01-13 DIAGNOSIS — Z20.822 CLOSE EXPOSURE TO COVID-19 VIRUS: Primary | ICD-10-CM

## 2022-01-13 DIAGNOSIS — R52 GENERALIZED BODY ACHES: ICD-10-CM

## 2022-01-13 PROCEDURE — 3008F BODY MASS INDEX DOCD: CPT | Performed by: NURSE PRACTITIONER

## 2022-01-13 PROCEDURE — 87637 SARSCOV2&INF A&B&RSV AMP PRB: CPT | Performed by: NURSE PRACTITIONER

## 2022-01-13 PROCEDURE — 99213 OFFICE O/P EST LOW 20 MIN: CPT | Mod: CS | Performed by: NURSE PRACTITIONER

## 2022-01-13 ASSESSMENT — ENCOUNTER SYMPTOMS
APPETITE CHANGE: 1
COUGH: 0
SHORTNESS OF BREATH: 0
FATIGUE: 1

## 2022-01-13 NOTE — LETTER
January 13, 2022     Patient: Annette Montes De Oca  YOB: 1970  Date of Visit: 1/13/2022    To Whom it May Concern:    Annette Montes De Oca was seen in my clinic on 1/13/2022 at 1:00 pm. Please excuse Annette NEIL for her absence from work on this day to make the appointment. Patient was seen for symptoms starting 01/10/2022 and causing missed work. May return to work 01/17/2022 with no restrictions, pending test results.    If you have any questions or concerns, please don't hesitate to call.         Sincerely,           CLINTON Contreras        CC: No Recipients

## 2022-01-13 NOTE — PROGRESS NOTES
"      Subjective      Patient ID: Annette Montes De Oca is a 51 y.o. female.  1970      Mon started with body aches and feeling off.  Coworker tested positive and was out Tues. Tues fatigue. Now having some congestion, no appetite. Intermittent chills. Blood sugar elevated. Drinking fluids. Denies taking any meds      The following have been reviewed and updated as appropriate in this visit:   Tobacco  Allergies  Meds  Problems  Med Hx  Surg Hx  Fam Hx       Review of Systems   Constitutional: Positive for appetite change and fatigue.   HENT: Positive for congestion.    Respiratory: Negative for cough and shortness of breath.        Objective     Vitals:    01/13/22 1312   BP: 136/88   BP Location: Left upper arm   Patient Position: Sitting   Pulse: 89   Resp: 16   Temp: 36.3 °C (97.3 °F)   TempSrc: Temporal   SpO2: 99%   Weight: 86.2 kg (190 lb)   Height: 1.6 m (5' 3\")     Body mass index is 33.66 kg/m².    Physical Exam  Vitals and nursing note reviewed.   Constitutional:       General: She is not in acute distress.     Appearance: She is well-developed. She is ill-appearing. She is not diaphoretic.   HENT:      Right Ear: Hearing, ear canal and external ear normal. Tympanic membrane is injected.      Left Ear: Hearing, ear canal and external ear normal. Tympanic membrane is injected.      Nose: Mucosal edema present. No rhinorrhea.      Right Turbinates: Enlarged.      Left Turbinates: Enlarged.      Right Sinus: No maxillary sinus tenderness or frontal sinus tenderness.      Left Sinus: No maxillary sinus tenderness or frontal sinus tenderness.      Mouth/Throat:      Lips: Pink.      Mouth: Mucous membranes are moist.      Pharynx: Oropharynx is clear. Uvula midline. No posterior oropharyngeal erythema.      Tonsils: No tonsillar exudate. 0 on the right. 0 on the left.   Cardiovascular:      Rate and Rhythm: Normal rate and regular rhythm.      Heart sounds: Normal heart sounds. No murmur heard.    No " friction rub.   Pulmonary:      Effort: Pulmonary effort is normal. No respiratory distress.      Breath sounds: Normal breath sounds. No stridor. No decreased breath sounds, wheezing, rhonchi or rales.   Lymphadenopathy:      Head:      Right side of head: Tonsillar adenopathy present. No submental, submandibular, preauricular or posterior auricular adenopathy.      Left side of head: Tonsillar adenopathy present. No submental, submandibular, preauricular or posterior auricular adenopathy.      Cervical: No cervical adenopathy.   Neurological:      Mental Status: She is alert.         Assessment/Plan   Diagnoses and all orders for this visit:    Close exposure to COVID-19 virus (Primary)  -     COVID- 19 PCR Symptomatic (includes FLU A/B & RSV) - [facility]    Generalized body aches  -     COVID- 19 PCR Symptomatic (includes FLU A/B & RSV) - [facility]    Fatigue, unspecified type  -     COVID- 19 PCR Symptomatic (includes FLU A/B & RSV) - [facility]      Patient Instructions   Patient present with congestion, body aches, and Covid exposure. Advised patient that these signs may be representative of Covid. Patient agrees to Covid testing at [facility]. Advised patient that she should self quarantine until results are available. Patient should go to ER if fever is uncontrolled by Advil 600mg every 6 hours and/or tylenol 1000mg 6 hours as needed, or if new symptoms develop such as chest pain or shortness of breath.  Patient is agreeable to treatment plan. No further questions at this time.    Per CDC guidelines, if you tested positive for COVID (or think you had COVID) AND had symptoms:  You can be with others after  · At least 10 days since symptoms first appeared and   · At least 24 hours with no fever without fever-reducing medication and   · Other symptoms of COVID-19 are improving (loss of taste and smell may persist for weeks or months after recovery and need not delay the end of isolation)

## 2022-01-13 NOTE — PATIENT INSTRUCTIONS
Patient present with congestion, body aches, and Covid exposure. Advised patient that these signs may be representative of Covid. Patient agrees to Covid testing at [facility]. Advised patient that she should self quarantine until results are available. Patient should go to ER if fever is uncontrolled by Advil 600mg every 6 hours and/or tylenol 1000mg 6 hours as needed, or if new symptoms develop such as chest pain or shortness of breath.  Patient is agreeable to treatment plan. No further questions at this time.    Per CDC guidelines, if you tested positive for COVID (or think you had COVID) AND had symptoms:  You can be with others after  · At least 10 days since symptoms first appeared and   · At least 24 hours with no fever without fever-reducing medication and   · Other symptoms of COVID-19 are improving (loss of taste and smell may persist for weeks or months after recovery and need not delay the end of isolation)

## 2022-01-14 LAB
FLUAV RNA SPEC QL NAA+PROBE: NEGATIVE
FLUBV RNA SPEC QL NAA+PROBE: NEGATIVE
RSV RNA SPEC QL NAA+PROBE: NEGATIVE
SARS-COV-2 RNA RESP QL NAA+PROBE: NEGATIVE

## 2022-01-17 ENCOUNTER — TELEPHONE (OUTPATIENT)
Dept: PRIMARY CARE | Facility: CLINIC | Age: 52
End: 2022-01-17
Payer: COMMERCIAL

## 2022-01-17 NOTE — TELEPHONE ENCOUNTER
patient was seen thursday , she is requesting we add that she missed work on wed 01/12 because she started feeling sick to her dr's note and upload to patient portal , she is returning to work today

## 2022-01-17 NOTE — TELEPHONE ENCOUNTER
I called Tavon and I received her voicemail so I left a message for her. Sheeba saw her on 1/13/2022 and wrote her a not for work excusing her from 1/10/2022 - up until 1/16/2022, letting her know its good for her to return to work on 1/17/2022.

## 2022-03-24 ENCOUNTER — TELEPHONE (OUTPATIENT)
Dept: PRIMARY CARE | Facility: CLINIC | Age: 52
End: 2022-03-24
Payer: COMMERCIAL

## 2022-03-24 NOTE — TELEPHONE ENCOUNTER
Patient is requesting a rx for a colonoscopy sent to Dr. Green [facility] , she will call back with the fax # . She also wants the dr to be aware she is being treated at [facility] for fracture in right knee tibia , fracture happened in November.

## 2022-04-07 ENCOUNTER — TELEPHONE (OUTPATIENT)
Dept: PRIMARY CARE | Facility: CLINIC | Age: 52
End: 2022-04-07

## 2022-04-07 NOTE — TELEPHONE ENCOUNTER
Barbara from [employer] [phone]. Need confirmation on patient meets medical criteria for the program. Prescription for Smart Map was received 12/6/22.  If Dr. Beavers or her nurse can call Barbara to do a chart review.

## 2022-04-13 NOTE — TELEPHONE ENCOUNTER
I have called brandi and left voicemail to discuss what criteria patient needs to meet for program.

## 2022-04-15 DIAGNOSIS — J30.89 OTHER ALLERGIC RHINITIS: ICD-10-CM

## 2022-04-15 RX ORDER — LORATADINE 10 MG/1
TABLET ORAL
Qty: 30 TABLET | Refills: 3 | Status: SHIPPED | OUTPATIENT
Start: 2022-04-15 | End: 2022-08-24

## 2022-04-15 NOTE — TELEPHONE ENCOUNTER
"Please call to schedule. At last OV in November , \"Return in about 6 months (around 5/19/2022)\" last visit was a sick visit with Sheeba  "

## 2022-04-19 ENCOUNTER — TELEPHONE (OUTPATIENT)
Dept: PRIMARY CARE | Facility: CLINIC | Age: 52
End: 2022-04-19

## 2022-04-19 NOTE — TELEPHONE ENCOUNTER
Barbara from [employer] [phone] needs a call back to confirm script. She previously spoke with Vladimir

## 2022-05-17 RX ORDER — MONTELUKAST SODIUM 10 MG/1
TABLET ORAL
Qty: 30 TABLET | Refills: 5 | Status: SHIPPED | OUTPATIENT
Start: 2022-05-17 | End: 2022-11-16

## 2022-06-03 ENCOUNTER — OFFICE VISIT (OUTPATIENT)
Dept: PRIMARY CARE | Facility: CLINIC | Age: 52
End: 2022-06-03
Payer: COMMERCIAL

## 2022-06-03 VITALS
OXYGEN SATURATION: 98 % | HEIGHT: 63 IN | HEART RATE: 97 BPM | TEMPERATURE: 97 F | SYSTOLIC BLOOD PRESSURE: 136 MMHG | DIASTOLIC BLOOD PRESSURE: 64 MMHG | RESPIRATION RATE: 16 BRPM | WEIGHT: 185 LBS | BODY MASS INDEX: 32.78 KG/M2

## 2022-06-03 DIAGNOSIS — Z09 FOLLOW-UP EXAM: Primary | ICD-10-CM

## 2022-06-03 DIAGNOSIS — Z23 NEED FOR DIPHTHERIA-TETANUS-PERTUSSIS (TDAP) VACCINE: ICD-10-CM

## 2022-06-03 DIAGNOSIS — E10.65 TYPE 1 DIABETES MELLITUS WITH HYPERGLYCEMIA (CMS/HCC): ICD-10-CM

## 2022-06-03 DIAGNOSIS — E03.9 HYPOTHYROIDISM, UNSPECIFIED TYPE: ICD-10-CM

## 2022-06-03 PROCEDURE — 3008F BODY MASS INDEX DOCD: CPT | Performed by: NURSE PRACTITIONER

## 2022-06-03 PROCEDURE — 99213 OFFICE O/P EST LOW 20 MIN: CPT | Mod: 25 | Performed by: NURSE PRACTITIONER

## 2022-06-03 PROCEDURE — 90715 TDAP VACCINE 7 YRS/> IM: CPT | Performed by: NURSE PRACTITIONER

## 2022-06-03 PROCEDURE — 90471 IMMUNIZATION ADMIN: CPT | Performed by: NURSE PRACTITIONER

## 2022-06-03 ASSESSMENT — ENCOUNTER SYMPTOMS
DIZZINESS: 0
COUGH: 0
FEVER: 0
FATIGUE: 0
HEADACHES: 0
DYSURIA: 0

## 2022-06-03 NOTE — PATIENT INSTRUCTIONS
Patient presents for 6 months follow up. Labs ordered, patient will get labs done before endocrine appointment. Tdap updated at this point. Will get records from eye doctor, colonoscopy, and Shingrix vaccine.

## 2022-06-03 NOTE — PROGRESS NOTES
"      Subjective      Patient ID: aiden Montes De Oca is a 52 y.o. female.  1970      6 month follow up    Endocrinology, last appt 9/2021. Blood sugars have 200s. Has appt with endocrine in July.  Eye doctor appt on 6/24. Last diabetice retinal exam 4 months ago [facility], Dr. Bob      The following have been reviewed and updated as appropriate in this visit:   Tobacco  Allergies  Meds  Problems  Med Hx  Surg Hx  Fam Hx         Review of Systems   Constitutional: Negative for fatigue and fever.   Respiratory: Negative for cough.    Genitourinary: Negative for dysuria and urgency.   Neurological: Negative for dizziness and headaches.       Objective     Vitals:    06/03/22 1324   BP: 136/64   BP Location: Left upper arm   Patient Position: Sitting   Pulse: 97   Resp: 16   Temp: 36.1 °C (97 °F)   TempSrc: Temporal   SpO2: 98%   Weight: 83.9 kg (185 lb)   Height: 1.6 m (5' 3\")     Body mass index is 32.77 kg/m².    Physical Exam  Vitals and nursing note reviewed.   Constitutional:       Appearance: Normal appearance. She is not ill-appearing or toxic-appearing.   Cardiovascular:      Rate and Rhythm: Normal rate and regular rhythm.      Heart sounds: Normal heart sounds. No murmur heard.  Pulmonary:      Effort: Pulmonary effort is normal.      Breath sounds: Normal breath sounds. No decreased breath sounds, wheezing, rhonchi or rales.   Neurological:      Mental Status: She is alert.         Assessment/Plan   Diagnoses and all orders for this visit:    Follow-up exam (Primary)    Type 1 diabetes mellitus with hyperglycemia (CMS/[facility])  -     Comprehensive metabolic panel; Future  -     Hemoglobin A1c; Future  -     Microalbumin / creatinine urine ratio; Future    Need for diphtheria-tetanus-pertussis (Tdap) vaccine  -     Tdap vaccine greater than or equal to 6yo IM    Hypothyroidism, unspecified type  -     TSH; Future  -     T4, free; Future      Patient Instructions   Patient presents for 6 months follow " up. Labs ordered, patient will get labs done before endocrine appointment. Tdap updated at this point. Will get records from eye doctor, colonoscopy, and Shingrix vaccine.

## 2022-06-12 ENCOUNTER — TELEPHONE (OUTPATIENT)
Dept: PRIMARY CARE | Facility: CLINIC | Age: 52
End: 2022-06-12
Payer: COMMERCIAL

## 2022-06-16 RX ORDER — ZOLPIDEM TARTRATE 5 MG/1
TABLET ORAL
Qty: 30 TABLET | Refills: 0 | Status: SHIPPED | OUTPATIENT
Start: 2022-06-16 | End: 2022-09-12 | Stop reason: SDUPTHER

## 2022-06-16 RX ORDER — ATORVASTATIN CALCIUM 10 MG/1
TABLET, FILM COATED ORAL
Qty: 30 TABLET | Refills: 6 | Status: SHIPPED | OUTPATIENT
Start: 2022-06-16 | End: 2022-10-04

## 2022-07-05 ENCOUNTER — OFFICE VISIT (OUTPATIENT)
Dept: PRIMARY CARE | Facility: CLINIC | Age: 52
End: 2022-07-05
Payer: COMMERCIAL

## 2022-07-05 VITALS
OXYGEN SATURATION: 99 % | RESPIRATION RATE: 16 BRPM | DIASTOLIC BLOOD PRESSURE: 70 MMHG | BODY MASS INDEX: 32.77 KG/M2 | HEART RATE: 76 BPM | SYSTOLIC BLOOD PRESSURE: 138 MMHG | TEMPERATURE: 97.4 F | HEIGHT: 63 IN

## 2022-07-05 DIAGNOSIS — E10.65 TYPE 1 DIABETES MELLITUS WITH HYPERGLYCEMIA (CMS/HCC): ICD-10-CM

## 2022-07-05 DIAGNOSIS — J06.9 VIRAL URI: ICD-10-CM

## 2022-07-05 DIAGNOSIS — H66.90 ACUTE OTITIS MEDIA, UNSPECIFIED OTITIS MEDIA TYPE: Primary | ICD-10-CM

## 2022-07-05 PROCEDURE — 99213 OFFICE O/P EST LOW 20 MIN: CPT | Performed by: INTERNAL MEDICINE

## 2022-07-05 PROCEDURE — 87637 SARSCOV2&INF A&B&RSV AMP PRB: CPT | Performed by: INTERNAL MEDICINE

## 2022-07-05 PROCEDURE — 3008F BODY MASS INDEX DOCD: CPT | Performed by: INTERNAL MEDICINE

## 2022-07-05 RX ORDER — AZITHROMYCIN 250 MG/1
TABLET, FILM COATED ORAL
Qty: 6 TABLET | Refills: 0 | Status: SHIPPED | OUTPATIENT
Start: 2022-07-05 | End: 2022-07-10

## 2022-07-05 NOTE — PROGRESS NOTES
Annette Montes De Oca is a 52 y.o. female present in the office today for acute visit    PCP Dr Beavers, new to me      HPI     Here re L ear pain    URI symptoms started 5 days ago. Nasal congestion, slight cough and scratchy throat  Woke up today with L ear feeling full and achy. Some dec hearing.   Had chills and slight abdominal pain yesterday. Resolved today.  No fevers.   Yesterday some nausea at work, but resolved  Eating and drinking okay    Hasn't done home covid test    Blood sugars have increased over the past couple of days (some as high as 400s), and states always feels slightly short of breath when blood sugars high (Not new). No CP.  Endo- Dr Vega.    Hx of seasonal allergies as well. Taking Claritin, Singulair, Flonase, Astelin.   Hx of ear infection in the past, last about 9 months ago. Always L ear.    No known sick contacts    Covid vaccinated and 2 boosters.     Has been increasing fluids.       Noted PCN GI intolerance   Has tolerated Zpack in the past     Past Medical History:   Diagnosis Date   • Diabetes mellitus type I (CMS/HCC)    • Disease of thyroid gland        Past Surgical History:   Procedure Laterality Date   • CARPAL TUNNEL RELEASE     • FOOT SURGERY     • HERNIA REPAIR     • MOUTH SURGERY     • OOPHORECTOMY     • SINUS SURGERY     • WISDOM TOOTH EXTRACTION          Social History     Socioeconomic History   • Marital status:      Spouse name: None   • Number of children: None   • Years of education: None   • Highest education level: None   Tobacco Use   • Smoking status: Never Smoker   • Smokeless tobacco: Never Used   Vaping Use   • Vaping Use: Never used   Substance and Sexual Activity   • Alcohol use: No   • Drug use: No   • Sexual activity: Defer   Social History Narrative    Raf petit       Family History   Problem Relation Age of Onset   • Heart disease Maternal Grandmother    • Kidney disease Maternal Grandmother        Allergies:  Codeine, Cortisone, and  Penicillins    Current Outpatient Medications   Medication Sig Dispense Refill   • atorvastatin (LIPITOR) 10 mg tablet TAKE 1 TABLET BY MOUTH EVERY DAY 30 tablet 6   • azelastine 0.15 % (205.5 mcg) nasal spray ADMINISTER 1 SPRAY INTO EACH NOSTRIL 2 TIMES A DAY AS NEEDED FOR RHINITIS. 30 mL 4   • buPROPion  mg 24 hr tablet Take 1 tablet (150 mg total) by mouth once daily. 30 tablet 6   • CLINPRO 5000 1.1 % paste BRUSH TEETH FOR 1 MINUTE TWICE A DAY DO NOT RINSE, EAT OR DRINK FOR 30 MINS AFTER     • fluticasone propionate (FLONASE) 50 mcg/actuation nasal spray Administer 1 spray into each nostril daily.     • insulin regular U-100 (HumuLIN R,NovoLIN R) 100 unit/mL injection inject by sliding scale with meals (according to carbohydrate scale)     • levothyroxine (SYNTHROID) 75 mcg tablet 1 TABLET ON AN EMPTY STOMACH IN THE MORNING  3   • loratadine (CLARITIN) 10 mg tablet TAKE 1 TABLET BY MOUTH EVERY DAY 30 tablet 3   • montelukast (SINGULAIR) 10 mg tablet TAKE 1 TABLET BY MOUTH EVERY DAY AT NIGHT 30 tablet 5   • norgestrel-ethinyl estradioL (LOW-OGESTREL,CRYSELLE) 0.3-30 mg-mcg per tablet take 1 tablet by oral route  every day     • NovoLIN N NPH U-100 Insulin 100 unit/mL injection INJECT 40 UNITS SUBCUTANEOUS DAILY AT BEDTIME     • PARoxetine 40 mg tablet Take 1 tablet (40 mg total) by mouth once daily. 90 tablet 6   • zolpidem (AMBIEN) 5 mg tablet TAKE 1 TABLET BY MOUTH NIGHTLY AS NEEDED FOR SLEEP 30 tablet 0     No current facility-administered medications for this visit.       Review of Systems   Pertinent positives and negatives as above    Vitals:    07/05/22 1526   BP: 138/70   Pulse: 76   Resp: 16   Temp: 36.3 °C (97.4 °F)   SpO2: 99%       Body mass index is 32.77 kg/m².    PE  General appearance: NAD  Skin: Skin color, texture, turgor normal. No rashes or lesions.  Head: Normocephalic.  Eyes: conjunctivae/corneas clear.   Ears: External ears normal.   L TM erythema and mild bulging, canal normal.  R  canal and TM unremarkable.  Nose/Sinuses: No sinus tenderness.  Oropharynx: mask  Neck: Neck supple. No adenopathy.   Lungs:. Good diaphragmatic excursion. lungs cta no wheezing  Heart: RRR. No murmurs, rubs or gallops  Abdomen: Abdomen soft, non-tender. BS normal. No masses, organomegaly  Extremities: Extremities normal. No deformities, edema. Scattered cat scratches on b/l lower legs (new kitten)      Lab Results   Component Value Date    WBC 5.6 10/06/2017    HGB 14.3 10/06/2017    HCT 41.9 10/06/2017     (H) 10/06/2017    CHOL 125 03/23/2021    TRIG 68 03/23/2021    HDL 43 03/23/2021    ALT 7 03/23/2021    AST 11 03/23/2021     03/23/2021    K 4.3 03/23/2021     03/23/2021    CREATININE 82.1 09/11/2021    BUN 8 03/23/2021    CO2 26 03/23/2021    TSH 2.060 03/23/2021    HGBA1C 8.2 (H) 03/23/2021    MICROALBUR 5.4 09/11/2021       Assessment/Plan      Problem List Items Addressed This Visit    None         1. Acute otitis media, unspecified otitis media type  In the setting of below. PCN intolerance (GI) noted, has tolerated azithromycin in past.   - azithromycin (ZITHROMAX) 250 mg tablet; Take 2 tablets the first day, then 1 tablet daily for 4 days.  Dispense: 6 tablet; Refill: 0    2. Viral URI  Day 5 of symptoms. Afebrile and non-toxic appearing. Aside from above, symptoms appear mild at this time. Reviewed rest, hydration, supportive measures. Discussed quarantine pending result. Call for F/C, new or worsening symptoms.   - COVID- 19 PCR Symptomatic (includes FLU A/B & RSV) - [facility]    3. Type 1 diabetes mellitus with hyperglycemia (CMS/[facility])  Strongly recommended that she call her endocrinologist to discuss recommendations re hyperglycemia which is driven by infection. She states she will call.           Diane Martínez MD  7/5/2022

## 2022-07-09 LAB
ALBUMIN SERPL-MCNC: 3.3 G/DL (ref 3.8–4.9)
ALBUMIN/CREAT UR: 7 MG/G CREAT (ref 0–29)
ALBUMIN/GLOB SERPL: 1.4 {RATIO} (ref 1.2–2.2)
ALP SERPL-CCNC: 93 IU/L (ref 44–121)
ALT SERPL-CCNC: 7 IU/L (ref 0–32)
AST SERPL-CCNC: 11 IU/L (ref 0–40)
BILIRUB SERPL-MCNC: 0.2 MG/DL (ref 0–1.2)
BUN SERPL-MCNC: 14 MG/DL (ref 6–24)
BUN/CREAT SERPL: 14 (ref 9–23)
CALCIUM SERPL-MCNC: 8.9 MG/DL (ref 8.7–10.2)
CHLORIDE SERPL-SCNC: 107 MMOL/L (ref 96–106)
CO2 SERPL-SCNC: 21 MMOL/L (ref 20–29)
CREAT SERPL-MCNC: 1.01 MG/DL (ref 0.57–1)
CREAT UR-MCNC: 53 MG/DL
EGFRCR SERPLBLD CKD-EPI 2021: 67 ML/MIN/1.73
GLOBULIN SER CALC-MCNC: 2.4 G/DL (ref 1.5–4.5)
GLUCOSE SERPL-MCNC: 147 MG/DL (ref 65–99)
HBA1C MFR BLD: 8.5 % (ref 4.8–5.6)
MICROALBUMIN UR-MCNC: 3.6 UG/ML
POTASSIUM SERPL-SCNC: 4.2 MMOL/L (ref 3.5–5.2)
PROT SERPL-MCNC: 5.7 G/DL (ref 6–8.5)
SODIUM SERPL-SCNC: 142 MMOL/L (ref 134–144)
T4 FREE SERPL-MCNC: 1.42 NG/DL (ref 0.82–1.77)
TSH SERPL DL<=0.005 MIU/L-ACNC: 1.81 UIU/ML (ref 0.45–4.5)

## 2022-07-15 LAB — MLHC DIABETIC EYE EXAM (EXTERNAL): NORMAL

## 2022-07-26 ENCOUNTER — OFFICE VISIT (OUTPATIENT)
Dept: PRIMARY CARE | Facility: CLINIC | Age: 52
End: 2022-07-26
Payer: COMMERCIAL

## 2022-07-26 VITALS
RESPIRATION RATE: 18 BRPM | SYSTOLIC BLOOD PRESSURE: 120 MMHG | TEMPERATURE: 99 F | BODY MASS INDEX: 33.49 KG/M2 | HEIGHT: 63 IN | WEIGHT: 189 LBS | DIASTOLIC BLOOD PRESSURE: 80 MMHG | OXYGEN SATURATION: 99 % | HEART RATE: 87 BPM

## 2022-07-26 DIAGNOSIS — H66.91 RIGHT ACUTE OTITIS MEDIA: Primary | ICD-10-CM

## 2022-07-26 PROCEDURE — 3008F BODY MASS INDEX DOCD: CPT | Performed by: NURSE PRACTITIONER

## 2022-07-26 PROCEDURE — 99213 OFFICE O/P EST LOW 20 MIN: CPT | Performed by: NURSE PRACTITIONER

## 2022-07-26 RX ORDER — DOXYCYCLINE HYCLATE 100 MG
100 TABLET ORAL 2 TIMES DAILY
Qty: 14 TABLET | Refills: 0 | Status: SHIPPED | OUTPATIENT
Start: 2022-07-26 | End: 2022-08-02

## 2022-07-26 ASSESSMENT — ENCOUNTER SYMPTOMS
HEADACHES: 0
ADENOPATHY: 1
FATIGUE: 0
SINUS PRESSURE: 1
VOMITING: 0
DIZZINESS: 0
FEVER: 0
SHORTNESS OF BREATH: 0
CHILLS: 0
NAUSEA: 0
DIARRHEA: 0
SORE THROAT: 0

## 2022-07-26 ASSESSMENT — PAIN SCALES - GENERAL: PAINLEVEL: 2

## 2022-07-26 NOTE — LETTER
July 26, 2022     Patient: Annette Montes De Oca  YOB: 1970  Date of Visit: 7/26/2022    To Whom it May Concern:    Annette Montes De Oca was seen in my clinic on 7/26/2022 at 1:30 pm. Please excuse Annette for her absence from work on 07/25/2022.    If you have any questions or concerns, please don't hesitate to call.         Sincerely,         CLINTON Caraballo

## 2022-07-26 NOTE — PROGRESS NOTES
[address], [address]  [place] [address]  Phone [phone] Fax [phone]     Reason for visit:   Chief Complaint   Patient presents with   • Otitis Media     Possible ear infection in left ear along with post nasal drip.      HPI   Annette Montes De Oca is a 52 y.o. female who presents with left ear pain, nasal congestion, swollen glands x 2 days.  Associated with nasal congestion and post nasal drip.  No fever/chills/weakness/n/v/d.  She has a history of frequent ear infections.   Treated for AOM on 7/5 w/ azithromycin. Symptoms had completely resolved.        Medical History:  Past Medical History:   Diagnosis Date   • Diabetes mellitus type I (CMS/[facility])    • Disease of thyroid gland        Surgical History:  Past Surgical History:   Procedure Laterality Date   • CARPAL TUNNEL RELEASE     • FOOT SURGERY     • HERNIA REPAIR     • MOUTH SURGERY     • OOPHORECTOMY     • SINUS SURGERY     • WISDOM TOOTH EXTRACTION         Social History:  Social History     Social History Narrative    Raf petit       Family History:  Family History   Problem Relation Age of Onset   • Heart disease Maternal Grandmother    • Kidney disease Maternal Grandmother        Allergies:  Codeine, Cortisone, and Penicillins    Current Medications:  Current Outpatient Medications   Medication Sig Dispense Refill   • atorvastatin (LIPITOR) 10 mg tablet TAKE 1 TABLET BY MOUTH EVERY DAY 30 tablet 6   • azelastine 0.15 % (205.5 mcg) nasal spray ADMINISTER 1 SPRAY INTO EACH NOSTRIL 2 TIMES A DAY AS NEEDED FOR RHINITIS. 30 mL 4   • buPROPion  mg 24 hr tablet Take 1 tablet (150 mg total) by mouth once daily. 30 tablet 6   • CLINPRO 5000 1.1 % paste BRUSH TEETH FOR 1 MINUTE TWICE A DAY DO NOT RINSE, EAT OR DRINK FOR 30 MINS AFTER     • doxycycline hyclate (VIBRA-TABS) 100 mg tablet Take 1 tablet (100 mg total) by mouth 2 (two) times a day for 7 days. 14 tablet 0   • fluticasone propionate (FLONASE) 50  mcg/actuation nasal spray Administer 1 spray into each nostril daily.     • insulin aspart (NovoLOG) 100 unit/mL patient supplied pump Inject under the skin continuously.     • levothyroxine (SYNTHROID) 75 mcg tablet 1 TABLET ON AN EMPTY STOMACH IN THE MORNING  3   • loratadine (CLARITIN) 10 mg tablet TAKE 1 TABLET BY MOUTH EVERY DAY 30 tablet 3   • montelukast (SINGULAIR) 10 mg tablet TAKE 1 TABLET BY MOUTH EVERY DAY AT NIGHT 30 tablet 5   • norgestrel-ethinyl estradioL (LOW-OGESTREL,CRYSELLE) 0.3-30 mg-mcg per tablet take 1 tablet by oral route  every day     • PARoxetine 40 mg tablet Take 1 tablet (40 mg total) by mouth once daily. 90 tablet 6   • zolpidem (AMBIEN) 5 mg tablet TAKE 1 TABLET BY MOUTH NIGHTLY AS NEEDED FOR SLEEP 30 tablet 0   • insulin regular U-100 (HumuLIN R,NovoLIN R) 100 unit/mL injection inject by sliding scale with meals (according to carbohydrate scale)     • NovoLIN N NPH U-100 Insulin 100 unit/mL injection INJECT 40 UNITS SUBCUTANEOUS DAILY AT BEDTIME       No current facility-administered medications for this visit.       Review of Systems:  Review of Systems   Constitutional: Negative for chills, fatigue and fever.   HENT: Positive for congestion, ear pain, postnasal drip and sinus pressure. Negative for ear discharge and sore throat.    Respiratory: Negative for shortness of breath.    Cardiovascular: Negative for chest pain.   Gastrointestinal: Negative for diarrhea, nausea and vomiting.   Neurological: Negative for dizziness and headaches.   Hematological: Positive for adenopathy.       Objective     Vital Signs:  Vitals:    07/26/22 1312   BP: 120/80   Pulse: 87   Resp: 18   Temp: 37.2 °C (99 °F)   SpO2: 99%       BMI:  Body mass index is 33.48 kg/m².     Physical Exam  Vitals reviewed.   Constitutional:       Appearance: Normal appearance.   HENT:      Right Ear: Tympanic membrane and ear canal normal.      Left Ear: Ear canal normal. No tenderness. Tympanic membrane is erythematous  and bulging.      Nose: Congestion (clear nasal discharge) present.      Mouth/Throat:      Pharynx: No posterior oropharyngeal erythema.   Cardiovascular:      Heart sounds: Normal heart sounds.   Pulmonary:      Effort: Pulmonary effort is normal.      Breath sounds: Normal breath sounds. No wheezing.   Lymphadenopathy:      Cervical: No cervical adenopathy.   Neurological:      Mental Status: She is alert and oriented to person, place, and time.   Psychiatric:         Mood and Affect: Mood normal.         Behavior: Behavior normal.         Recent labs before today:     Lab Results   Component Value Date    WBC 5.6 10/06/2017    HGB 14.3 10/06/2017    HCT 41.9 10/06/2017     (H) 10/06/2017    CHOL 125 03/23/2021    TRIG 68 03/23/2021    HDL 43 03/23/2021    ALT 7 07/08/2022    AST 11 07/08/2022     07/08/2022    K 4.2 07/08/2022     (H) 07/08/2022    CREATININE 1.01 (H) 07/08/2022    BUN 14 07/08/2022    CO2 21 07/08/2022    TSH 1.810 07/08/2022    HGBA1C 8.5 (H) 07/08/2022    MICROALBUR 3.6 07/08/2022        Procedures       Problem List Items Addressed This Visit        Nervous    Right acute otitis media - Primary     Reports hx of s.e. with penicillins.  Tx with doxycycline.   Continue supportive care.  Pt advised to update office if any new or worsening symptoms, including increased pain, fever, or chills.                       CLINTON Caraballo  7/26/2022

## 2022-07-26 NOTE — ASSESSMENT & PLAN NOTE
Reports hx of s.e. with penicillins.  Tx with doxycycline.   Continue supportive care.  Pt advised to update office if any new or worsening symptoms, including increased pain, fever, or chills.

## 2022-08-15 ENCOUNTER — TELEPHONE (OUTPATIENT)
Dept: PRIMARY CARE | Facility: CLINIC | Age: 52
End: 2022-08-15

## 2022-08-15 NOTE — TELEPHONE ENCOUNTER
Patient was up in [place] with family since her brother just passed away. While there she had 2 seizures and was in the hospital. They weren't able to determine the cause. They told her to follow up with DR Beavers. Dr Beavers first available wasn't until October. Is there anyway we could fit her in this week? Her brothers service is Friday. Requesting callback.

## 2022-08-22 DIAGNOSIS — J30.89 OTHER ALLERGIC RHINITIS: ICD-10-CM

## 2022-08-23 ENCOUNTER — OFFICE VISIT (OUTPATIENT)
Dept: PRIMARY CARE | Facility: CLINIC | Age: 52
End: 2022-08-23
Payer: COMMERCIAL

## 2022-08-23 VITALS
DIASTOLIC BLOOD PRESSURE: 82 MMHG | WEIGHT: 189 LBS | RESPIRATION RATE: 16 BRPM | BODY MASS INDEX: 33.49 KG/M2 | OXYGEN SATURATION: 98 % | HEIGHT: 63 IN | HEART RATE: 95 BPM | SYSTOLIC BLOOD PRESSURE: 120 MMHG

## 2022-08-23 DIAGNOSIS — I21.4 NSTEMI (NON-ST ELEVATED MYOCARDIAL INFARCTION) (CMS/HCC): ICD-10-CM

## 2022-08-23 DIAGNOSIS — I82.621 ACUTE DEEP VEIN THROMBOSIS (DVT) OF BRACHIAL VEIN OF RIGHT UPPER EXTREMITY (CMS/HCC): Primary | ICD-10-CM

## 2022-08-23 DIAGNOSIS — E10.65 TYPE 1 DIABETES MELLITUS WITH HYPERGLYCEMIA (CMS/HCC): ICD-10-CM

## 2022-08-23 DIAGNOSIS — E10.3553 STABLE PROLIFERATIVE DIABETIC RETINOPATHY OF BOTH EYES ASSOCIATED WITH TYPE 1 DIABETES MELLITUS (CMS/HCC): ICD-10-CM

## 2022-08-23 PROCEDURE — 99214 OFFICE O/P EST MOD 30 MIN: CPT | Performed by: FAMILY MEDICINE

## 2022-08-23 PROCEDURE — 3008F BODY MASS INDEX DOCD: CPT | Performed by: FAMILY MEDICINE

## 2022-08-23 RX ORDER — ENOXAPARIN SODIUM 150 MG/ML
120 INJECTION SUBCUTANEOUS EVERY 12 HOURS
COMMUNITY
End: 2022-09-30 | Stop reason: SDUPTHER

## 2022-08-23 RX ORDER — INSULIN GLARGINE 100 [IU]/ML
INJECTION, SOLUTION SUBCUTANEOUS
COMMUNITY
Start: 2022-08-09 | End: 2024-04-19

## 2022-08-23 NOTE — PROGRESS NOTES
[facility] IN [place]       Reason for visit: Follow-up (Pt states she had 2 gran mal seizures; pt reports she has a large blood clot in right arm; pt states that she was visiting family in the hospital and accidentally when hugging/saying good bye to family member and her insulin pump didn't work)    HPI:  Annette Montes De Oca is a 52 y.o. female presenting for follow-up after hospital discharge.    Discharge Diagnosis: seizure  hyperlucemia   Discharging Facility:  [facility], [facility], and [facility]     Date of Admission: 8/6  Date of Discharge: 8/9/2022     Summary of Hospital Course Annette Montes De Oca is a 53 y/o woman with T1DM and multiple prior hospitalizations for DKA, hypothyroidism, and anxiety/depression who was admitted on 8/6/2022 for DKA and seizure, treated with insulin drip with closure of anion gap prior to discharge.    Events leading to admission:  Has poorly controlled T1DM.  On 8/6/2022 was visiting her brother in neuro ICU at [facility] when she had witnessed seizure w/ urinary incontinence, mouth foaming, right gaze deviation. Found to have  and anion gap 20. Admitted to MICU for insulin drip.    Sadly, her brother passed from massive stroke while she was admitted to the hospital.    Hospital course by problem:    # DKA: resolved  - , pH 7.23, AG 20, lactate 9.44 on presentation  - Admitted to ICU for insulin drip- gap closure within 24 hours, lactate normalized  - Transitioned to subcutaneous insulin: basal/bolus  - Endocrinology consult team assisted w/ management    # T1DM with repeated episodes of DKA  - A1c 8.1%  - Prev used NPH 45U at bedtime and regular insulin 6U premeal (2 meals per day) -- this regimen was chosen due to affordability  - Two weeks prior to admission, started using insulin pump with aspart, however recently her dexcom sensor malfunctioned  - After stopping insulin drip, basal/bolus started:  Lantus  16U daily  Lispro 6U + 2-10U SS mealtime  - With assistance from pharmacy staff, care manager, and endocrinology, determined affordable regimen that provides her good coverage:  Lantus 16U daily (9AM) -- vial, 1000 units, $86 at her local [facility] ([phone])  Novolog 6U with meals -- vial, 1000 units, $87 at her local [facility]  New glucometer and 10 lancets -- provided free of cost  Onetouch contour test strips -- sent 300 to Lindsay at [facility]  Synringes -- sent 100 to Lindsay at [facility]  - Insulin teaching with pharmacy prior to discharge  - Discussed care with outpatient endocrinologist Dr. Vega, who will see her on Monday 8/15/22  Outpatient plan  [ ] Family will  insulin on day of discharge so she can use it right away  [ ] If she continues on basal/bolus regimen, the insulin vials at [facility] are more affordable for her -- about $180 for 50-60 days of lantus 16U and aspart 6U TID  [ ] Patient instructed to measure fasting and pre-meal blood sugars and keep log  [ ] Close follow-up with Dr. Vega (local endocrinologist), defer further management to him    # [id number]  - High-sensitivity troponins 200 -> 1200, no chest pain or ECG changes, downtrended  - Lipid profile: total chol 144, HDL 40, LDL 73,   - No known history of CAD but has risk factors (T2DM, obesity); 10-year ASCVD risk score is 2.7%  - Deferred inpatient workup  Outpatient plan  [ ] PCP: consider noninvasive cardiac workup: TTE vs CT coronary  [ ] Continue atorvastatin 10 mg at bedtime    # Right upper extremity DVT  - Found to have swollen RUE, ultrasound revealed occlusive clot of R brachial vein  - Started on lovenox 90 mg BID while determining outpatient AC regimen  - Provided with 30-day sample pack of apixaban with loading (10 mg BID x7 days -> 5 mg BID x23 days)  - Cost of DOACs is exorbitant w/ her insurance plan, however therapeutic lovenox x30 days is more affordable ($20)  Outpatient plan  [ ] PCP: after 30 days of apixaban  "therapy, will need to transition to therapeutic lovenox -- 60 days sent to pharmacy, will be on hold until 9/8/2022  Provided at hospital:  ---> Apixaban 10 mg BID x7 days  ---> Apixaban 5 mg BID x23 days  Sent to pharmacy:  ---> Lovenox 90 mg BID x60 days, to start 9/8/2022    # Hypomagnesemia  - Noted to have Mg 1.0 - 1.2 during admission, given repletion  Outpatient plan  [ ] Magnesium supplement, 800 mg daily. Further monitoring per PCP.    # Seizure: R gaze deviation, urinary incontinence, foaming at mouth, upper extremity extension  - Initially treated w/ keppra and then discontinued  - No further seizures after resolution of DKA and discontinuation of keppra  - Non-con head CT unremarkable, MRI w/o structural abnormalities  - Per neuro, no concern for seizure disorder  Outpatient plan  [ ] No need for anti-epileptic drugs or neurology f/up, seizure 2/2 metabolic derangement and not seizure disorder or structural abnormality    Discharge Medications  - Medication List     START taking these medications     Advocate Insulin Syringe 29G X 1/2\" 0.3 ML Miscellaneous  Generic drug: Insulin Syringe-Needle U-100  by Other route. Indications: OTHER    apixaban 5 MG Tablet  Commonly known as: ELIQUIS  Take 2 tablets by mouth 2 Times a Day for 7 days, THEN 1 tablet 2 Times a Day for 23 days. Indications: Anticoagulant Therapy  Start taking on: August 8, 2022    Enoxaparin Sodium 100 MG/ML  Commonly known as: Lovenox  Inject 0.9 mL under the skin Every 12 Hours for 60 days. Indications: Anticoagulant Therapy  Start taking on: September 8, 2022    glucose blood Strip  by Other route See Administration Instructions. Use as instructed Indications: OTHER    insulin aspart 100 UNIT/ML Solution injection  Commonly known as: NovoLOG  Inject 6 Units under the skin 3 Times a Day Before Meals for 30 days. Indications: Diabetes    insulin glargine 100 UNIT/ML Solution injection  Commonly known as: Lantus  Inject 16 Units under the " "skin Daily. Indications: Diabetes    Magnesium 400 MG Tablet  Take 2 tablet by mouth Daily for 30 days. Indications: Disorder in Nutrition    CONTINUE taking these medications     atorvastatin 10 MG Tablet  Commonly known as: LIPITOR  Take 1 tablet by mouth Daily.    buPROPion  MG Tablet ER 24 Hour  Commonly known as: WELLBUTRIN XL  Take 1 tablet by mouth Nightly.    levothyroxine 75 MCG Tablet  Commonly known as: SYNTHROID  Take 1 tablet by mouth Daily.    loratadine 10 MG Tablet  Commonly known as: CLARITIN  Take 1 tablet by mouth Daily.    montelukast 10 MG Tablet  Commonly known as: SINGULAIR  Take 1 tablet by mouth.    norgestrel-ethinyl estradiol 0.3-30 MG-MCG Tablet  Commonly known as: LO/OVRAL  Take 1 tablet by mouth Daily.    PARoxetine 40 MG Tablet  Commonly known as: PAXIL  Take 1 tablet by mouth Daily.  Medication List       START taking these medications     Advocate Insulin Syringe 29G X 1/2\" 0.3 ML Miscellaneous  Generic drug: Insulin Syringe-Needle U-100  by Other route. Indications: OTHER    apixaban 5 MG Tablet  Commonly known as: ELIQUIS  Take 2 tablets by mouth 2 Times a Day for 7 days, THEN 1 tablet 2 Times a Day for 23 days. Indications: Anticoagulant Therapy  Start taking on: August 8, 2022    Enoxaparin Sodium 100 MG/ML  Commonly known as: Lovenox  Inject 0.9 mL under the skin Every 12 Hours for 60 days. Indications: Anticoagulant Therapy  Start taking on: September 8, 2022    glucose blood Strip  by Other route See Administration Instructions. Use as instructed Indications: OTHER    insulin aspart 100 UNIT/ML Solution injection  Commonly known as: NovoLOG  Inject 6 Units under the skin 3 Times a Day Before Meals for 30 days. Indications: Diabetes    insulin glargine 100 UNIT/ML Solution injection  Commonly known as: Lantus  Inject 16 Units under the skin Daily. Indications: Diabetes    Magnesium 400 MG Tablet  Take 2 tablet by mouth Daily for 30 days. Indications: Disorder in Nutrition "     CT head non-con 8/6/22  FINDINGS:   Motion degradation of images through the skull base.    Mild prominence of ventricle sulci most likely reflects generalized parenchymal volume loss. Nonspecific foci of hypoattenuation in the supratentorial white matter most likely reflects chronic microvascular ischemia. No acute intracranial hemorrhage, extra-axial fluid collection, or midline shift is identified. Gray-white matter differentiation is preserved. There are calcifications of the cavernous internal carotid arteries.    No acute calvarial fracture is identified. The visualized paranasal sinuses and mastoid air cells are well-aerated. Left ocular lens replacement.    IMPRESSION:  No evidence of acute intracranial abnormality.    MRI brain with + without contrast 8/8/22  FINDINGS:   The exam is suboptimal due to patient motion.    There is no acute infarction. There is no acute intracranial hemorrhage. There is proportionate prominence of the ventricles and sulci, compatible with mild generalized parenchymal volume loss. There is no extra-axial fluid collection, mass effect, or herniation. There are scattered nonspecific foci of white matter T2 hyperintensity, probably mild chronic microvascular ischemic disease. There is no abnormal focus of parenchymal or leptomeningeal contrast enhancement. The intracranial flow voids appear preserved.    The hippocampi are symmetric and within normal limits in signal and morphology, without disproportionate atrophy.    There is no suspicious osseous lesion. There is mild mucosal thickening within the maxillary sinuses. The paranasal sinuses and mastoid air cells appear otherwise well aerated. A left lens replacement is noted.    IMPRESSION:  1. No acute infarction, intracranial hemorrhage, mass effect, or abnormal intracranial enhancement. Normal and symmetric appearance of the hippocampi without disproportionate atrophy.  2. Mild generalized parenchymal volume loss and mild  chronic microvascular ischemic white matter disease, greater than expected for patient's age.    Right upper extremity ultrasound (DVT study) 8/8/22  Findings:   Right Brachial Vein found to be partially compressible proximally then became fully non-compressible as probe was moved distally. Little to no color doppler seen on right brachial vein. Visible intraluminal partially occlusive in proximal right brachial vein. Visible intraluminal complete occlusive clot found on distal right brachial vein.     Impression:  Findings concerning for right brachial vein DVT         Advance Care Planning Documents     Document Type Status Effective Date Expiration Date Received On Description    Advance Directives and Living Will Not Received        Power of  Not Received              Patient Active Problem List   Diagnosis   • Anxiety   • Hypothyroid   • Iron deficiency anemia   • Abdominal wall abscess   • Paronychia of toe of left foot   • Dysuria   • Seasonal allergies   • Type 1 diabetes mellitus with hyperglycemia (CMS/[facility])   • 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 1 diabetes mellitus (CMS/[facility])   • Right acute otitis media   • Acute deep vein thrombosis (DVT) of brachial vein of right upper extremity (CMS/[facility])   • NSTEMI (non-ST elevated myocardial infarction) (CMS/[facility])     Past Medical History:   Diagnosis Date   • Diabetes mellitus type I (CMS/H[facility])    • Disease of thyroid gland    • Grand mal seizure (CMS/[facility])      Past Surgical History:   Procedure Laterality Date   • CARPAL TUNNEL RELEASE     • FOOT SURGERY     • HERNIA REPAIR     • MOUTH SURGERY     • OOPHORECTOMY     • SINUS SURGERY     • WISDOM TOOTH EXTRACTION       Social History     Socioeconomic History   • Marital status:      Spouse name: Not on file   • Number of children: Not on file   • Years of education: Not on file   • Highest education level: Not on file   Occupational History   • Not on file    Tobacco Use   • Smoking status: Never Smoker   • Smokeless tobacco: Never Used   Vaping Use   • Vaping Use: Never used   Substance and Sexual Activity   • Alcohol use: No   • Drug use: No   • Sexual activity: Defer   Other Topics Concern   • Not on file   Social History Narrative    Sales damaso     Social Determinants of Health     Financial Resource Strain: Not on file   Food Insecurity: Not on file   Transportation Needs: Not on file   Physical Activity: Not on file   Stress: Not on file   Social Connections: Not on file   Intimate Partner Violence: Not on file   Housing Stability: Not on file     Family History   Problem Relation Age of Onset   • Heart disease Maternal Grandmother    • Kidney disease Maternal Grandmother      Codeine, Cortisone, and Penicillins  Current Outpatient Medications   Medication Sig Dispense Refill   • apixaban (ELIQUIS) 5 mg tablet      • atorvastatin (LIPITOR) 10 mg tablet TAKE 1 TABLET BY MOUTH EVERY DAY 30 tablet 6   • azelastine 0.15 % (205.5 mcg) nasal spray ADMINISTER 1 SPRAY INTO EACH NOSTRIL 2 TIMES A DAY AS NEEDED FOR RHINITIS. 30 mL 4   • CLINPRO 5000 1.1 % paste BRUSH TEETH FOR 1 MINUTE TWICE A DAY DO NOT RINSE, EAT OR DRINK FOR 30 MINS AFTER     • enoxaparin (LOVENOX) 120 mg/0.8 mL syringe Inject 120 mg under the skin every 12 hours.     • fluticasone propionate (FLONASE) 50 mcg/actuation nasal spray Administer 1 spray into each nostril daily.     • insulin aspart (NovoLOG) 100 unit/mL patient supplied pump Inject under the skin continuously.     • insulin glargine U-100 (LANTUS) 100 unit/mL injection INJECT 16 UNITS UNDER THE SKIN DAILY. INDICATIONS: DIABETES     • insulin regular U-100 (HumuLIN R,NovoLIN R) 100 unit/mL injection inject by sliding scale with meals (according to carbohydrate scale)     • levothyroxine (SYNTHROID) 75 mcg tablet 1 TABLET ON AN EMPTY STOMACH IN THE MORNING  3   • montelukast (SINGULAIR) 10 mg tablet TAKE 1 TABLET BY MOUTH EVERY DAY AT NIGHT  "30 tablet 5   • NovoLIN N NPH U-100 Insulin 100 unit/mL injection INJECT 40 UNITS SUBCUTANEOUS DAILY AT BEDTIME     • PARoxetine 40 mg tablet Take 1 tablet (40 mg total) by mouth once daily. 90 tablet 6   • zolpidem (AMBIEN) 5 mg tablet TAKE 1 TABLET BY MOUTH NIGHTLY AS NEEDED FOR SLEEP 30 tablet 0   • buPROPion XL (WELLBUTRIN XL) 150 mg 24 hr tablet TAKE 1 TABLET BY MOUTH EVERY DAY 30 tablet 6   • loratadine (CLARITIN) 10 mg tablet TAKE 1 TABLET BY MOUTH EVERY DAY 30 tablet 3     No current facility-administered medications for this visit.       ROS:  Review of Systems    Vitals:    08/23/22 1538   BP: 120/82   BP Location: Left upper arm   Patient Position: Sitting   Pulse: 95   Resp: 16   SpO2: 98%   Weight: 85.7 kg (189 lb)   Height: 1.6 m (5' 3\")       EXAM:  Physical Exam  Constitutional:       Appearance: She is well-developed.   HENT:      Head: Normocephalic and atraumatic.   Neck:      Thyroid: No thyromegaly.   Cardiovascular:      Rate and Rhythm: Normal rate and regular rhythm.      Heart sounds: Normal heart sounds. No murmur heard.    No friction rub. No gallop.   Pulmonary:      Effort: Pulmonary effort is normal. No respiratory distress.      Breath sounds: Normal breath sounds. No wheezing or rales.   Musculoskeletal:      Cervical back: Normal range of motion and neck supple.   Lymphadenopathy:      Cervical: No cervical adenopathy.   Neurological:      Mental Status: She is alert and oriented to person, place, and time.   Psychiatric:         Behavior: Behavior normal.         Thought Content: Thought content normal.         Judgment: Judgment normal.         Procedures    Lab Results   Component Value Date    WBC 5.6 10/06/2017    HGB 14.3 10/06/2017    HCT 41.9 10/06/2017     (H) 10/06/2017    CHOL 125 03/23/2021    TRIG 68 03/23/2021    HDL 43 03/23/2021    ALT 7 07/08/2022    AST 11 07/08/2022     07/08/2022    K 4.2 07/08/2022     (H) 07/08/2022    CREATININE 1.01 (H) " 07/08/2022    BUN 14 07/08/2022    CO2 21 07/08/2022    TSH 1.810 07/08/2022    HGBA1C 8.5 (H) 07/08/2022    MICROALBUR 3.6 07/08/2022         ASSESSMENT/PLAN:  Diagnoses and all orders for this visit:    Acute deep vein thrombosis (DVT) of brachial vein of right upper extremity (CMS/[facility]) (Primary)  -     Ambulatory referral to Hematology; Future    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 1 diabetes mellitus (CMS/[facility])    Type 1 diabetes mellitus with hyperglycemia (CMS/[facility])    NSTEMI (non-ST elevated myocardial infarction) (CMS/[facility])          Disc stressors   Her brother passed away and step sisters dtr over dosed   Disc her hospitalization and important followup - needs to see heme onc and cardio   She has had followup with endo     She does have rx for eliquis and is using  lovenox -  Per hosp records - insurance coverage issues she has month of eliquis from hospital but lovenox is more afforable option longer term -  Will need to dw hematologist and also for hypercoag eval to see if long term anticoag is needed           Ruba Beavers MD  8/28/2022

## 2022-08-23 NOTE — PATIENT INSTRUCTIONS
See hematologist Dr Rowan or partners     See Cardiologist  Dr Johnson here [place]    or Dr Julito Rodriguez [place] or macy

## 2022-08-24 RX ORDER — LORATADINE 10 MG/1
TABLET ORAL
Qty: 30 TABLET | Refills: 3 | Status: SHIPPED | OUTPATIENT
Start: 2022-08-24 | End: 2022-12-15

## 2022-08-24 RX ORDER — BUPROPION HYDROCHLORIDE 150 MG/1
TABLET ORAL
Qty: 30 TABLET | Refills: 6 | Status: SHIPPED | OUTPATIENT
Start: 2022-08-24 | End: 2023-03-22

## 2022-08-25 ENCOUNTER — TELEPHONE (OUTPATIENT)
Dept: SCHEDULING | Facility: CLINIC | Age: 52
End: 2022-08-25
Payer: COMMERCIAL

## 2022-08-25 NOTE — TELEPHONE ENCOUNTER
Hospital Follow Up   Pt added to the waitlist and request a call back for a sooner available appt     Name of patient: Annette Montes De Oca    Caller Name: Annette    Relationship to patient: Self    New or Established: New     Reason for visit: HFU/ DKA, pt's heart created enzyemes blood clot in right shoulder, family history/ several relatives  from strokes pt's brother  2 weeks ago from a sudden stroke and he had blood clot, type one diabetic/ Pt needed Cardiologists due to family history    Previous Cardiologist: No    PCP: Dr. Ruba Beavers     Insurance name: [facility]    Doctor requested: Dr. Johnson per PCP     Name of hospital pt was admitted: [facility], and [facility]      Timeframe instructed to follow-up within: Unspecified     Best contact number: [phone]

## 2022-08-26 ENCOUNTER — TELEPHONE (OUTPATIENT)
Dept: PRIMARY CARE | Facility: CLINIC | Age: 52
End: 2022-08-26
Payer: COMMERCIAL

## 2022-08-26 NOTE — TELEPHONE ENCOUNTER
Attempted to call the patient in regards to her new patient appointment. The phone call was answered but then immediately ended.      AC- please connect to office if the patient calls back.

## 2022-08-26 NOTE — TELEPHONE ENCOUNTER
Talked to patient and reviewed [facility] paperwork with patient. Faxed to employer.   Patient working on getting hematologist appointment. [facility] and will call patient for appointment. Patient seeing cardiologist 10/4/22

## 2022-08-28 PROBLEM — I82.621 ACUTE DEEP VEIN THROMBOSIS (DVT) OF BRACHIAL VEIN OF RIGHT UPPER EXTREMITY (CMS/HCC): Status: ACTIVE | Noted: 2022-08-28

## 2022-08-28 PROBLEM — I21.4 NSTEMI (NON-ST ELEVATED MYOCARDIAL INFARCTION) (CMS/HCC): Status: ACTIVE | Noted: 2022-08-28

## 2022-09-08 LAB — MLHC DIABETIC EYE EXAM (EXTERNAL): NORMAL

## 2022-09-09 RX ORDER — ENOXAPARIN SODIUM 150 MG/ML
120 INJECTION SUBCUTANEOUS
Status: CANCELLED | OUTPATIENT
Start: 2022-09-09

## 2022-09-09 RX ORDER — ENOXAPARIN SODIUM 100 MG/ML
90 INJECTION SUBCUTANEOUS
Qty: 108 ML | Refills: 0 | Status: SHIPPED | OUTPATIENT
Start: 2022-09-09 | End: 2022-11-08

## 2022-09-09 NOTE — TELEPHONE ENCOUNTER
Patient requesting Lovenox per last office visit (see below). Patient seeing hematology 9/30/22. Please advise.      Enoxaparin Sodium 100 MG/ML  Commonly known as: Lovenox  Inject 0.9 mL under the skin Every 12 Hours for 60 days. Indications: Anticoagulant Therapy  Start taking on: September 8, 2022

## 2022-09-09 NOTE — TELEPHONE ENCOUNTER
Medication Request   Patient PCP: Ruba Beavers MD  Next Office Visit: 9/20/2022  Has this provider prescribed this medication before?: No  Medication Name: Lovenox  Medication Dose: 120 mg  Medication Frequency: None listed  Preferred Pharmacy:   [facility]/pharmacy #[id number] - [place] - [address]  [address] [place] [address]  Phone: [phone] Fax: [phone]     Patient stated the ordering physician told her to give the office of her PCP a call to request this medication.    Please allow 2 business days for your provider to send your medication request or to reach out to discuss.

## 2022-09-20 ENCOUNTER — OFFICE VISIT (OUTPATIENT)
Dept: PRIMARY CARE | Facility: CLINIC | Age: 52
End: 2022-09-20
Payer: COMMERCIAL

## 2022-09-20 VITALS
WEIGHT: 183 LBS | TEMPERATURE: 98 F | BODY MASS INDEX: 32.43 KG/M2 | HEIGHT: 63 IN | OXYGEN SATURATION: 99 % | DIASTOLIC BLOOD PRESSURE: 82 MMHG | RESPIRATION RATE: 16 BRPM | SYSTOLIC BLOOD PRESSURE: 122 MMHG | HEART RATE: 64 BPM

## 2022-09-20 DIAGNOSIS — E10.3553 STABLE PROLIFERATIVE DIABETIC RETINOPATHY OF BOTH EYES ASSOCIATED WITH TYPE 1 DIABETES MELLITUS (CMS/HCC): ICD-10-CM

## 2022-09-20 DIAGNOSIS — I21.4 NSTEMI (NON-ST ELEVATED MYOCARDIAL INFARCTION) (CMS/HCC): ICD-10-CM

## 2022-09-20 DIAGNOSIS — Z23 FLU VACCINE NEED: Primary | ICD-10-CM

## 2022-09-20 DIAGNOSIS — Z01.818 PRE-OP TESTING: ICD-10-CM

## 2022-09-20 DIAGNOSIS — E10.65 TYPE 1 DIABETES MELLITUS WITH HYPERGLYCEMIA (CMS/HCC): ICD-10-CM

## 2022-09-20 DIAGNOSIS — I82.621 ACUTE DEEP VEIN THROMBOSIS (DVT) OF BRACHIAL VEIN OF RIGHT UPPER EXTREMITY (CMS/HCC): ICD-10-CM

## 2022-09-20 DIAGNOSIS — E03.9 ACQUIRED HYPOTHYROIDISM: ICD-10-CM

## 2022-09-20 DIAGNOSIS — H26.9 CATARACT OF RIGHT EYE, UNSPECIFIED CATARACT TYPE: ICD-10-CM

## 2022-09-20 PROCEDURE — 90686 IIV4 VACC NO PRSV 0.5 ML IM: CPT | Performed by: FAMILY MEDICINE

## 2022-09-20 PROCEDURE — 99214 OFFICE O/P EST MOD 30 MIN: CPT | Mod: 25 | Performed by: FAMILY MEDICINE

## 2022-09-20 PROCEDURE — 93000 ELECTROCARDIOGRAM COMPLETE: CPT | Performed by: FAMILY MEDICINE

## 2022-09-20 PROCEDURE — 3008F BODY MASS INDEX DOCD: CPT | Performed by: FAMILY MEDICINE

## 2022-09-20 PROCEDURE — 90471 IMMUNIZATION ADMIN: CPT | Performed by: FAMILY MEDICINE

## 2022-09-20 NOTE — PROGRESS NOTES
[facility]   Ruba Nimesh  [address], [address]  [place]  [place] [address]  Phone: [phone]  Fax:[phone]      Visit Date: 9/21/2022    Patient ID: Annette Montes De Oca is a 52 y.o. female.  Surgeon: Guy Marks   Date of surgery: 10/17/2022     Procedure: cataract right         Chief Complaint: Follow-up        HPI    Pt with hx of Type 1 dm hypothyroidism obesity diabetic retinopathy anxiety now  Recent upper ext DVT and [id number] presents for follow up and also preoperative evaluation for cataract surgery Right eye cataract      Works retail  But is currently on medical leave from hospitalization ( see previous notes 8/6-9/9 seizure, DKA [id number] ; Rigth upper ext DVT started on lovenox . )     Dr. Vega endo  has apt tomorrow   Feels better has gotten back to using lantus works better for her      See endo labs in emr hga1c 8.3    Lab Results   Component Value Date    WBC 5.6 10/06/2017    HGB 14.3 10/06/2017    HCT 41.9 10/06/2017     (H) 10/06/2017    CHOL 125 03/23/2021    TRIG 68 03/23/2021    HDL 43 03/23/2021    ALT 7 07/08/2022    AST 11 07/08/2022     07/08/2022    K 4.2 07/08/2022     (H) 07/08/2022    CREATININE 1.01 (H) 07/08/2022    BUN 14 07/08/2022    CO2 21 07/08/2022    TSH 1.810 07/08/2022    HGBA1C 8.5 (H) 07/08/2022    MICROALBUR 3.6 07/08/2022    LDLCALC 68 03/23/2021       Patient Active Problem List   Diagnosis    Anxiety    Hypothyroid    Iron deficiency anemia    Abdominal wall abscess    Paronychia of toe of left foot    Dysuria    Seasonal allergies    Type 1 diabetes mellitus with hyperglycemia (CMS/HCC)    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 1 diabetes mellitus (CMS/HCC)    Right acute otitis media    Acute deep vein thrombosis (DVT) of brachial vein of right upper extremity (CMS/HCC)    NSTEMI (non-ST elevated myocardial infarction) (CMS/HCC)    Cataract of  right eye          Current Outpatient Medications:     apixaban (ELIQUIS) 5 mg tablet, , Disp: , Rfl:     atorvastatin (LIPITOR) 10 mg tablet, TAKE 1 TABLET BY MOUTH EVERY DAY, Disp: 30 tablet, Rfl: 6    azelastine 0.15 % (205.5 mcg) nasal spray, ADMINISTER 1 SPRAY INTO EACH NOSTRIL 2 TIMES A DAY AS NEEDED FOR RHINITIS., Disp: 30 mL, Rfl: 4    buPROPion XL (WELLBUTRIN XL) 150 mg 24 hr tablet, TAKE 1 TABLET BY MOUTH EVERY DAY, Disp: 30 tablet, Rfl: 6    CLINPRO 5000 1.1 % paste, BRUSH TEETH FOR 1 MINUTE TWICE A DAY DO NOT RINSE, EAT OR DRINK FOR 30 MINS AFTER, Disp: , Rfl:     enoxaparin (LOVENOX) 100 mg/mL syringe, Inject 0.9 mL (90 mg total) under the skin every 12 (twelve) hours., Disp: 108 mL, Rfl: 0    enoxaparin (LOVENOX) 120 mg/0.8 mL syringe, Inject 120 mg under the skin every 12 hours., Disp: , Rfl:     fluticasone propionate (FLONASE) 50 mcg/actuation nasal spray, Administer 1 spray into each nostril daily., Disp: , Rfl:     insulin aspart (NovoLOG) 100 unit/mL patient supplied pump, Inject under the skin continuously., Disp: , Rfl:     insulin glargine U-100 (LANTUS) 100 unit/mL injection, INJECT 16 UNITS UNDER THE SKIN DAILY. INDICATIONS: DIABETES, Disp: , Rfl:     insulin regular U-100 (HumuLIN R,NovoLIN R) 100 unit/mL injection, inject by sliding scale with meals (according to carbohydrate scale), Disp: , Rfl:     levothyroxine (SYNTHROID) 75 mcg tablet, 1 TABLET ON AN EMPTY STOMACH IN THE MORNING, Disp: , Rfl: 3    loratadine (CLARITIN) 10 mg tablet, TAKE 1 TABLET BY MOUTH EVERY DAY, Disp: 30 tablet, Rfl: 3    montelukast (SINGULAIR) 10 mg tablet, TAKE 1 TABLET BY MOUTH EVERY DAY AT NIGHT, Disp: 30 tablet, Rfl: 5    NovoLIN N NPH U-100 Insulin 100 unit/mL injection, INJECT 40 UNITS SUBCUTANEOUS DAILY AT BEDTIME, Disp: , Rfl:     PARoxetine 40 mg tablet, Take 1 tablet (40 mg total) by mouth once daily., Disp: 90 tablet, Rfl: 6    zolpidem (AMBIEN) 5 mg tablet, TAKE 1 TABLET BY MOUTH  "NIGHTLY AS NEEDED FOR SLEEP, Disp: 30 tablet, Rfl: 1    Allergies   Allergen Reactions    Codeine Other (see comments)     Other reaction(s): Other (see comments)    Cortisone      inrease in blood sugars, mood changes    Penicillins GI intolerance     Other reaction(s): Other (see comments)       Past Medical History:   Diagnosis Date    Diabetes mellitus type I (CMS/HCC)     Disease of thyroid gland     Grand mal seizure (CMS/HCC)        Past Surgical History:   Procedure Laterality Date    CARPAL TUNNEL RELEASE      FOOT SURGERY      HERNIA REPAIR      MOUTH SURGERY      OOPHORECTOMY      SINUS SURGERY      WISDOM TOOTH EXTRACTION         Social History     Tobacco Use    Smoking status: Never Smoker    Smokeless tobacco: Never Used   Vaping Use    Vaping Use: Never used   Substance Use Topics    Alcohol use: No    Drug use: No       Family History   Problem Relation Age of Onset    Heart disease Maternal Grandmother     Kidney disease Maternal Grandmother         Review of Systems    Vitals:    09/20/22 1541   BP: 122/82   BP Location: Left upper arm   Patient Position: Sitting   Pulse: 64   Resp: 16   Temp: 36.7 °C (98 °F)   TempSrc: Temporal   SpO2: 99%   Weight: 83 kg (183 lb)   Height: 1.6 m (5' 3\")       Body mass index is 32.42 kg/m².      Physical Exam  Constitutional:       Appearance: She is well-developed. She is obese.   HENT:      Head: Normocephalic and atraumatic.   Neck:      Thyroid: No thyromegaly.   Cardiovascular:      Rate and Rhythm: Normal rate and regular rhythm.      Heart sounds: Normal heart sounds. No murmur heard.    No friction rub. No gallop.   Pulmonary:      Effort: Pulmonary effort is normal. No respiratory distress.      Breath sounds: Normal breath sounds. No wheezing or rales.   Abdominal:      General: Bowel sounds are normal.      Tenderness: There is no abdominal tenderness. There is no guarding or rebound.      Hernia: No hernia is present. "   Musculoskeletal:      Cervical back: Normal range of motion and neck supple.      Right lower leg: No edema.      Left lower leg: No edema.      Comments: Bruising both deltoid - pt reports from lovenox injectiosn - rec using abd later thighs for lovenox injections     No other bruising or petechea   Lymphadenopathy:      Cervical: No cervical adenopathy.   Neurological:      General: No focal deficit present.      Mental Status: She is alert and oriented to person, place, and time.   Psychiatric:         Attention and Perception: Attention and perception normal.         Mood and Affect: Mood is anxious.         Speech: Speech normal.         Behavior: Behavior normal.         Thought Content: Thought content normal.         Cognition and Memory: Cognition and memory normal.         Judgment: Judgment normal.            Assessment/Plan       Problem List Items Addressed This Visit        Circulatory    Acute deep vein thrombosis (DVT) of brachial vein of right upper extremity (CMS/HCC)    NSTEMI (non-ST elevated myocardial infarction) (CMS/[facility])       Endocrine/Metabolic    Hypothyroid    Type 1 diabetes mellitus with hyperglycemia (CMS/[facility])    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 1 diabetes mellitus (CMS/[facility])       Eye    Cataract of right eye      Other Visit Diagnoses     Flu vaccine need    -  Primary    Relevant Orders    Influenza vaccine quadrivalent preservative free 6 mon and older IM (FluLaval) (Completed)    Pre-op testing        Relevant Orders    ECG 12 LEAD OFFICE PERFORMED (Completed)        She is not yet cleared for procedure     She does need pre-op cardio eval with recent hospitalization with T2 NSTEMI  - she has an apt with cardio Dr Johnson on 10/4    She is also taking lovenox and scheduled to see Dr Craft hematology for evaluation of new DVT and history family hypercoagulopathy - determination will need to be made if ok to hold lovenox for surgery    Given lovenox in lower abd and lateral thighs     Her diabetes if not a limitation for surgery her hga1c though not tightly controlled is in reasonable range to proceed for surgery and she is following with her endocrinologist regularly.          Ruba Beavers MD  9/21/2022

## 2022-09-20 NOTE — PATIENT INSTRUCTIONS
Please see Dr Johnson for preoperative clearance for cataract     Ask Dr Craft about stopping lovenox for cataract surgery - and hypercoag evaluation with your family history of blood clots  - how long you need to be on anticoagulation ?     Ask cataract surgeon how long you will need to be off  work after surgery ?  And if you need to be off anticoagulation prior to surgery

## 2022-09-30 ENCOUNTER — OFFICE VISIT (OUTPATIENT)
Dept: HEMATOLOGY/ONCOLOGY | Facility: CLINIC | Age: 52
End: 2022-09-30
Attending: INTERNAL MEDICINE
Payer: COMMERCIAL

## 2022-09-30 VITALS
WEIGHT: 177.6 LBS | DIASTOLIC BLOOD PRESSURE: 85 MMHG | OXYGEN SATURATION: 100 % | SYSTOLIC BLOOD PRESSURE: 139 MMHG | BODY MASS INDEX: 31.46 KG/M2 | HEART RATE: 93 BPM

## 2022-09-30 DIAGNOSIS — Z82.3 FAMILY HISTORY OF CEREBROVASCULAR ACCIDENT (CVA): ICD-10-CM

## 2022-09-30 DIAGNOSIS — I82.621 ACUTE DEEP VEIN THROMBOSIS (DVT) OF BRACHIAL VEIN OF RIGHT UPPER EXTREMITY (CMS/HCC): Primary | ICD-10-CM

## 2022-09-30 PROCEDURE — 3008F BODY MASS INDEX DOCD: CPT | Performed by: INTERNAL MEDICINE

## 2022-09-30 PROCEDURE — 99204 OFFICE O/P NEW MOD 45 MIN: CPT | Performed by: INTERNAL MEDICINE

## 2022-09-30 RX ORDER — BLOOD-GLUCOSE TRANSMITTER
EACH MISCELLANEOUS
COMMUNITY
Start: 2022-08-29

## 2022-09-30 RX ORDER — NORGESTREL AND ETHINYL ESTRADIOL 0.3-0.03MG
1 KIT ORAL
COMMUNITY
Start: 2022-09-08 | End: 2023-08-01 | Stop reason: ALTCHOICE

## 2022-09-30 RX ORDER — LANOLIN ALCOHOL/MO/W.PET/CERES
CREAM (GRAM) TOPICAL
COMMUNITY
Start: 2022-08-09 | End: 2022-11-08

## 2022-09-30 ASSESSMENT — PAIN SCALES - GENERAL
PAINLEVEL: 0-NO PAIN
PAINLEVEL: 0-NO PAIN

## 2022-09-30 ASSESSMENT — ENCOUNTER SYMPTOMS
DEPRESSION: 1
NERVOUS/ANXIOUS: 1
NEUROLOGICAL NEGATIVE: 1
RESPIRATORY NEGATIVE: 1
HEMATOLOGIC/LYMPHATIC NEGATIVE: 1
FATIGUE: 1
GASTROINTESTINAL NEGATIVE: 1
MUSCULOSKELETAL NEGATIVE: 1
CARDIOVASCULAR NEGATIVE: 1

## 2022-09-30 NOTE — PROGRESS NOTES
Review of Systems   Constitutional: Positive for fatigue.   Respiratory: Negative.    Cardiovascular: Negative.         R arm swelling   Gastrointestinal: Negative.    Musculoskeletal: Negative.    Skin: Positive for itching (dry skin).   Neurological: Negative.    Hematological: Negative.    Psychiatric/Behavioral: Positive for depression. The patient is nervous/anxious.

## 2022-09-30 NOTE — LETTER
September 30, 2022                                                                                                                             Patient: Annette Montes De Oca   YOB: 1970   Date of Visit: 9/30/2022       Dear Dr. Beavers:    Thank you for referring Annette Montes De Oca to me for evaluation at [facility] [place]. Attached is my assessment and plan of care.    If you have questions, please do not hesitate to call me. I look forward to following Annette Montes De Oca along with you.           Sincerely,        Sari Craft MD    CC: Darrell Vega MD

## 2022-09-30 NOTE — PROGRESS NOTES
Annette Montes De Oca is a 52 y.o. female,   :  1970  REFERRING PHYSICIAN:   Ruba Beavers MD  [address] [place] [id number]  PRIMARY CARE PROVIDER:  Ruba Beavers MD    Encounter Diagnoses   Name Primary?    Acute deep vein thrombosis (DVT) of brachial vein of right upper extremity (CMS/HCC) Yes    Family history of cerebrovascular accident (CVA)      Patient Active Problem List   Diagnosis    Anxiety    Hypothyroid    Iron deficiency anemia    Abdominal wall abscess    Paronychia of toe of left foot    Dysuria    Seasonal allergies    Type 1 diabetes mellitus with hyperglycemia (CMS/HCC)    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 1 diabetes mellitus (CMS/HCC)    Right acute otitis media    Acute deep vein thrombosis (DVT) of brachial vein of right upper extremity (CMS/HCC)    NSTEMI (non-ST elevated myocardial infarction) (CMS/HCC)    Cataract of right eye    Family history of cerebrovascular accident (CVA)       History of Present Illness  HPI  Annette Montes De Oca is seen today at the request of Ruba Beavers MD for   Encounter Diagnoses   Name Primary?    Acute deep vein thrombosis (DVT) of brachial vein of right upper extremity (CMS/HCC) Yes    Family history of cerebrovascular accident (CVA)      Annette Montes De Oca is a 52-year-old woman who is seen for a right upper extremity DVT as well as a family history of thromboembolic disease.  In August of this year the patient was in [place] visiting her brother who was hospitalized after a massive CVA.  He passed away from this event.  While there in the hospital patient had a seizure and was admitted.  She was found to be in DKA and admitted to the ICU.  Per the record she had a midline catheter placed.  2 days after admission she was noted to have swelling of her right arm and ultrasound revealed DVT in the brachial vein of the right arm.  Patient was started on  anticoagulation.  She was discharged on Eliquis for 1 month and then transition to Lovenox with the plan for 2 additional months.  Eliquis was chosen due to insurance coverage.  Patient reports she has been continuing to give herself the Eliquis injections.  She has some bruising at injection sites.  She has not noted any recurrent or persistent swelling in the right arm.  In addition to history of stroke in her brother she reports history of stroke in several grandparents.         Review of Systems - Oncology  Review of Systems:  Nursing assessment reviewed. Pertinent positive and negative symptoms noted in HPI, all others negative.    Heart Rate:  [93] 93  BP: (139)/(85) 139/85  Visit Vitals  /85 (BP Location: Right upper arm, Patient Position: Sitting)   Pulse 93   Wt 80.6 kg (177 lb 9.6 oz)   SpO2 100%   BMI 31.46 kg/m²     Physical Exam  Constitutional:       General: She is not in acute distress.     Appearance: She is well-developed.   HENT:      Head: Normocephalic.   Eyes:      General: No scleral icterus.     Pupils: Pupils are equal, round, and reactive to light.   Cardiovascular:      Rate and Rhythm: Normal rate and regular rhythm.   Pulmonary:      Effort: Pulmonary effort is normal.      Breath sounds: Normal breath sounds.   Abdominal:      General: There is no distension.      Palpations: Abdomen is soft.      Tenderness: There is no abdominal tenderness.   Musculoskeletal:         General: No tenderness.      Cervical back: Neck supple.   Lymphadenopathy:      Cervical: No cervical adenopathy.   Skin:     Findings: No rash.   Neurological:      Mental Status: She is alert.         Past Medical History:   Diagnosis Date    Diabetes mellitus type I (CMS/HCC)     Disease of thyroid gland     Grand mal seizure (CMS/HCC)        Past Surgical History:   Procedure Laterality Date    CARPAL TUNNEL RELEASE      FOOT SURGERY      HERNIA REPAIR      MOUTH SURGERY      OOPHORECTOMY      SINUS  SURGERY      WISDOM TOOTH EXTRACTION         Social History     Tobacco Use    Smoking status: Never Smoker    Smokeless tobacco: Never Used   Vaping Use    Vaping Use: Never used   Substance Use Topics    Alcohol use: No    Drug use: No       Family History   Problem Relation Age of Onset    Stroke Biological Brother     Heart disease Maternal Grandmother     Kidney disease Maternal Grandmother     Prostate cancer Paternal Grandmother          Allergies  Codeine, Cortisone, and Penicillins    Medications  Current Outpatient Medications   Medication Sig Dispense Refill    atorvastatin (LIPITOR) 10 mg tablet TAKE 1 TABLET BY MOUTH EVERY DAY 30 tablet 6    azelastine 0.15 % (205.5 mcg) nasal spray ADMINISTER 1 SPRAY INTO EACH NOSTRIL 2 TIMES A DAY AS NEEDED FOR RHINITIS. 30 mL 4    blood-glucose transmitter (DEXCOM G6 TRANSMITTER) device USE ONE TRANSMITTER WITH /SENSORS AND REPLACE EVERY 3 MONTHS      buPROPion XL (WELLBUTRIN XL) 150 mg 24 hr tablet TAKE 1 TABLET BY MOUTH EVERY DAY 30 tablet 6    CLINPRO 5000 1.1 % paste BRUSH TEETH FOR 1 MINUTE TWICE A DAY DO NOT RINSE, EAT OR DRINK FOR 30 MINS AFTER      enoxaparin (LOVENOX) 100 mg/mL syringe Inject 0.9 mL (90 mg total) under the skin every 12 (twelve) hours. 108 mL 0    fluticasone propionate (FLONASE) 50 mcg/actuation nasal spray Administer 1 spray into each nostril daily.      insulin aspart (NovoLOG) 100 unit/mL patient supplied pump Inject under the skin continuously.      insulin glargine U-100 (LANTUS) 100 unit/mL injection INJECT 16 UNITS UNDER THE SKIN DAILY. INDICATIONS: DIABETES      insulin regular U-100 (HumuLIN R,NovoLIN R) 100 unit/mL injection inject by sliding scale with meals (according to carbohydrate scale)      levothyroxine (SYNTHROID) 75 mcg tablet 1 TABLET ON AN EMPTY STOMACH IN THE MORNING  3    loratadine (CLARITIN) 10 mg tablet TAKE 1 TABLET BY MOUTH EVERY DAY 30 tablet 3    LOW-OGESTREL, 28, 0.3-30 mg-mcg  per tablet Take 1 tablet by mouth once daily.      magnesium oxide (MAG-OX) 400 mg (241.3 mg magnesium) tablet TAKE 2 TABLET BY MOUTH DAILY FOR 30 DAYS. INDICATIONS: DISORDER IN NUTRITION      montelukast (SINGULAIR) 10 mg tablet TAKE 1 TABLET BY MOUTH EVERY DAY AT NIGHT 30 tablet 5    NovoLIN N NPH U-100 Insulin 100 unit/mL injection INJECT 40 UNITS SUBCUTANEOUS DAILY AT BEDTIME      PARoxetine 40 mg tablet Take 1 tablet (40 mg total) by mouth once daily. 90 tablet 6    zolpidem (AMBIEN) 5 mg tablet TAKE 1 TABLET BY MOUTH NIGHTLY AS NEEDED FOR SLEEP 30 tablet 1    enoxaparin (LOVENOX) 120 mg/0.8 mL syringe Inject 120 mg under the skin every 12 hours.       No current facility-administered medications for this visit.          Laboratory  Laboratory studies from 9/20/2022 show a creatinine of 1.1 and normal liver function tests    Radiology  I have reviewed the patient's Radiology report(s).  Significant abnormals are Ultrasound from 8/8/2022 shows DVT in the right brachial vein.      Assessment and Plan  Acute deep vein thrombosis (DVT) of brachial vein of right upper extremity (CMS/HCC)  Patient has a history of right upper extremity brachial vein thrombosis diagnosed August 8, 2022.  This was in the setting of hospitalization for seizure and DKA.  Would likely related to midline catheter that she had placed during that admission.  She is currently on Lovenox and tolerating that well with resolution of her arm swelling.  Recommend she complete a total of 3 months of anticoagulation which she will finish in early November.    Because she did develop an upper extremity thrombosis as well as a strong family history of CVA at an early age, I have recommended a hypercoagulable work-up.  She was given prescription today for testing including lupus anticoagulant, anticardiolipin antibodies, protein C and S deficiency, Antithrombin III deficiency, factor V Leiden and prothrombin gene mutation.  I will follow-up with  her by phone when test results are available.  She was asked to call with questions or concerns in the interval.      Sari Craft MD

## 2022-09-30 NOTE — ASSESSMENT & PLAN NOTE
Patient has a history of right upper extremity brachial vein thrombosis diagnosed August 8, 2022.  This was in the setting of hospitalization for seizure and DKA.  Would likely related to midline catheter that she had placed during that admission.  She is currently on Lovenox and tolerating that well with resolution of her arm swelling.  Recommend she complete a total of 3 months of anticoagulation which she will finish in early November.    Because she did develop an upper extremity thrombosis as well as a strong family history of CVA at an early age, I have recommended a hypercoagulable work-up.  She was given prescription today for testing including lupus anticoagulant, anticardiolipin antibodies, protein C and S deficiency, Antithrombin III deficiency, factor V Leiden and prothrombin gene mutation.  I will follow-up with her by phone when test results are available.  She was asked to call with questions or concerns in the interval.

## 2022-10-04 ENCOUNTER — OFFICE VISIT (OUTPATIENT)
Dept: CARDIOLOGY | Facility: CLINIC | Age: 52
End: 2022-10-04
Payer: COMMERCIAL

## 2022-10-04 ENCOUNTER — TELEPHONE (OUTPATIENT)
Dept: CARDIOLOGY | Facility: CLINIC | Age: 52
End: 2022-10-04

## 2022-10-04 VITALS
RESPIRATION RATE: 18 BRPM | OXYGEN SATURATION: 99 % | HEART RATE: 100 BPM | SYSTOLIC BLOOD PRESSURE: 146 MMHG | DIASTOLIC BLOOD PRESSURE: 78 MMHG | HEIGHT: 63 IN | BODY MASS INDEX: 31.46 KG/M2

## 2022-10-04 DIAGNOSIS — Z82.3 FAMILY HISTORY OF CEREBROVASCULAR ACCIDENT (CVA): ICD-10-CM

## 2022-10-04 DIAGNOSIS — E10.65 TYPE 1 DIABETES MELLITUS WITH HYPERGLYCEMIA (CMS/HCC): ICD-10-CM

## 2022-10-04 DIAGNOSIS — E78.00 PURE HYPERCHOLESTEROLEMIA: ICD-10-CM

## 2022-10-04 DIAGNOSIS — I21.4 NSTEMI (NON-ST ELEVATED MYOCARDIAL INFARCTION) (CMS/HCC): ICD-10-CM

## 2022-10-04 DIAGNOSIS — Z82.3 FAMILY HISTORY OF STROKE: ICD-10-CM

## 2022-10-04 DIAGNOSIS — I82.621 ACUTE DEEP VEIN THROMBOSIS (DVT) OF BRACHIAL VEIN OF RIGHT UPPER EXTREMITY (CMS/HCC): Primary | ICD-10-CM

## 2022-10-04 DIAGNOSIS — E10.3553 STABLE PROLIFERATIVE DIABETIC RETINOPATHY OF BOTH EYES ASSOCIATED WITH TYPE 1 DIABETES MELLITUS (CMS/HCC): ICD-10-CM

## 2022-10-04 PROBLEM — R55 SYNCOPE: Status: ACTIVE | Noted: 2022-08-06

## 2022-10-04 PROBLEM — E10.10 DIABETIC KETOACIDOSIS WITHOUT COMA ASSOCIATED WITH TYPE 1 DIABETES MELLITUS (CMS/HCC): Status: ACTIVE | Noted: 2022-08-09

## 2022-10-04 PROCEDURE — 99204 OFFICE O/P NEW MOD 45 MIN: CPT | Performed by: INTERNAL MEDICINE

## 2022-10-04 PROCEDURE — 93000 ELECTROCARDIOGRAM COMPLETE: CPT | Performed by: INTERNAL MEDICINE

## 2022-10-04 PROCEDURE — 3008F BODY MASS INDEX DOCD: CPT | Performed by: INTERNAL MEDICINE

## 2022-10-04 RX ORDER — PREDNISOLONE ACETATE 10 MG/ML
SUSPENSION/ DROPS OPHTHALMIC SEE ADMIN INSTRUCTIONS
COMMUNITY
Start: 2022-09-08 | End: 2022-11-08

## 2022-10-04 RX ORDER — OFLOXACIN 3 MG/ML
SOLUTION/ DROPS OPHTHALMIC
COMMUNITY
Start: 2022-09-08 | End: 2022-11-08

## 2022-10-04 RX ORDER — ATORVASTATIN CALCIUM 20 MG/1
20 TABLET, FILM COATED ORAL DAILY
Qty: 90 TABLET | Refills: 3 | Status: SHIPPED | OUTPATIENT
Start: 2022-10-04 | End: 2023-10-09

## 2022-10-04 RX ORDER — BROMFENAC SODIUM 0.7 MG/ML
SOLUTION/ DROPS OPHTHALMIC
COMMUNITY
Start: 2022-09-09 | End: 2022-11-08

## 2022-10-04 NOTE — PROGRESS NOTES
Cardiology Consult/New Patient     Patient ID: Annette Montes De Oca 52 y.o. female. 1970  PCP: Ruba Beavers MD   History Present Illness     HPI:       Dear Rbua,    On 2022 I met Annette Montes De Oca in the office for the first time at your request.  She is a 52-year-old female with type 1 diabetes associated with retinopathy.  She also has hypothyroidism.  She was recently admitted to the hospital with DKA and was found to have a somewhat elevated troponin.  During that hospitalization she also developed an upper extremity DVT.  She is now here for cardiovascular evaluation.    Multiple family members have had strokes.  She just had a 59-year-old brother who  of a stroke.  Her great grandparents also had strokes.  Her father had a myocardial infarction at a young age and her mother has a pacemaker.    She does get occasional shortness of breath particularly when her blood sugars are up.  She denies chest discomfort, lightheadedness or syncope.    She does not smoke or drink alcohol.  She works in retail.  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  She tells me that her menstrual cycles are definitely changing.  She is allergic to codeine, cortisone and penicillin.  She has had an inguinal hernia repair, sinus surgery and toe surgery.    She denies liver disease, kidney disease, lung disease, GI disease or rheumatologic disease.  The rest of her review of systems is completely negative.    Past History     Past Medical History:   Diagnosis Date    Diabetes mellitus type I (CMS/HCC)     Disease of thyroid gland     Grand mal seizure (CMS/HCC)      Past Surgical History:   Procedure Laterality Date    CARPAL TUNNEL RELEASE      FOOT SURGERY      HERNIA REPAIR      MOUTH SURGERY      OOPHORECTOMY      SINUS SURGERY      WISDOM TOOTH EXTRACTION       Social History     Tobacco Use    Smoking status: Never Smoker    Smokeless tobacco: Never Used   Vaping Use    Vaping Use:  Never used   Substance Use Topics    Alcohol use: No    Drug use: No     Family History   Problem Relation Age of Onset    Stroke Biological Brother     Heart disease Maternal Grandmother     Kidney disease Maternal Grandmother     Prostate cancer Paternal Grandmother      Allergies/Medications   Allergies: Codeine, Cortisone, and Penicillins    Current Medications:   Outpatient Encounter Medications as of 10/4/2022:     atorvastatin (LIPITOR) 10 mg tablet, TAKE 1 TABLET BY MOUTH EVERY DAY    azelastine 0.15 % (205.5 mcg) nasal spray, ADMINISTER 1 SPRAY INTO EACH NOSTRIL 2 TIMES A DAY AS NEEDED FOR RHINITIS.    buPROPion XL (WELLBUTRIN XL) 150 mg 24 hr tablet, TAKE 1 TABLET BY MOUTH EVERY DAY    CLINPRO 5000 1.1 % paste, BRUSH TEETH FOR 1 MINUTE TWICE A DAY DO NOT RINSE, EAT OR DRINK FOR 30 MINS AFTER    enoxaparin (LOVENOX) 100 mg/mL syringe, Inject 0.9 mL (90 mg total) under the skin every 12 (twelve) hours.    fluticasone propionate (FLONASE) 50 mcg/actuation nasal spray, Administer 1 spray into each nostril daily.    insulin aspart (NovoLOG) 100 unit/mL patient supplied pump, Inject under the skin continuously.    insulin glargine U-100 (LANTUS) 100 unit/mL injection, INJECT 16 UNITS UNDER THE SKIN DAILY. INDICATIONS: DIABETES    levothyroxine (SYNTHROID) 75 mcg tablet, 1 TABLET ON AN EMPTY STOMACH IN THE MORNING    loratadine (CLARITIN) 10 mg tablet, TAKE 1 TABLET BY MOUTH EVERY DAY    LOW-OGESTREL, 28, 0.3-30 mg-mcg per tablet, Take 1 tablet by mouth once daily.    magnesium oxide (MAG-OX) 400 mg (241.3 mg magnesium) tablet, TAKE 2 TABLET BY MOUTH DAILY FOR 30 DAYS. INDICATIONS: DISORDER IN NUTRITION    montelukast (SINGULAIR) 10 mg tablet, TAKE 1 TABLET BY MOUTH EVERY DAY AT NIGHT    PARoxetine 40 mg tablet, Take 1 tablet (40 mg total) by mouth once daily.    zolpidem (AMBIEN) 5 mg tablet, TAKE 1 TABLET BY MOUTH NIGHTLY AS NEEDED FOR SLEEP    blood-glucose transmitter (DEXCOM G6  "TRANSMITTER) device, USE ONE TRANSMITTER WITH /SENSORS AND REPLACE EVERY 3 MONTHS    insulin regular U-100 (HumuLIN R,NovoLIN R) 100 unit/mL injection, inject by sliding scale with meals (according to carbohydrate scale)    NovoLIN N NPH U-100 Insulin 100 unit/mL injection, INJECT 40 UNITS SUBCUTANEOUS DAILY AT BEDTIME    ofloxacin (OCUFLOX) 0.3 % ophthalmic solution, 1 DROP TO OPERATIVE EYE 4X DAILY SATRING 3 DAYS PROIR TO SURGERY CONTINUE FOR 1 WEEK AFTER    prednisoLONE acetate (PRED FORTE) 1 % ophthalmic suspension, See admin instr.    PROLENSA 0.07 % drops, 1 DROP TO OPERATIVE EYE 4 TIMES DAILY 3 DAYS PRIOR TO SURGERY AND CONTINUING FOR 1 MONTH    [DISCONTINUED] apixaban (ELIQUIS) 5 mg tablet,     [DISCONTINUED] enoxaparin (LOVENOX) 120 mg/0.8 mL syringe, Inject 120 mg under the skin every 12 hours.    [DISCONTINUED] zolpidem (AMBIEN) 5 mg tablet, TAKE 1 TABLET BY MOUTH NIGHTLY AS NEEDED FOR SLEEP     ROS   Rest of her review of systems is completely negative.  Vitals:    10/04/22 1450   BP: (!) 146/78   BP Location: Left upper arm   Patient Position: Sitting   Pulse: 100   Resp: 18   SpO2: 99%   Height: 1.6 m (5' 3\")     Physical Exam   Pressure is 146/78.  Heart rate is 100 and regular.  She is comfortable.  Skin is warm and dry.  Conjunctiva are pink.  Affect is somewhat anxious.  No JVD or HJR.    Carotids are unremarkable.  Chest is clear.  Regular rhythm.  Normal S1 and S2.  No obvious murmurs or gallops.  Heart tones are distant.  Abdomen is soft and mildly obese with good bowel sounds.  No obvious organomegaly.  No clubbing, cyanosis, or edema.  I felt no distal pulses.  No rash.  No obvious musculoskeletal deformity.  Labs/ Imaging/Procedures   Labs  Lab Results   Component Value Date    CHOL 125 03/23/2021    CHOL 149 05/04/2020     Lab Results   Component Value Date    HDL 43 03/23/2021    HDL 44 05/04/2020     Lab Results   Component Value Date    LDLCALC 68 03/23/2021    LDLCALC 87 " 05/04/2020     Lab Results   Component Value Date    TRIG 68 03/23/2021    TRIG 89 05/04/2020     Lab Results   Component Value Date    WBC 5.6 10/06/2017    HGB 14.3 10/06/2017    HCT 41.9 10/06/2017    MCV 91 10/06/2017     (H) 10/06/2017     Lab Results   Component Value Date    GLUCOSE 147 (H) 07/08/2022     07/08/2022    K 4.2 07/08/2022    CO2 21 07/08/2022     (H) 07/08/2022    BUN 14 07/08/2022    CREATININE 1.01 (H) 07/08/2022     Lab Results   Component Value Date    HGBA1C 8.5 (H) 07/08/2022         EKG  Today's EKG revealed sinus rhythm with a left atrial abnormality and a left axis deviation.  There was diffuse nonspecific inferior T wave flattening.  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  She had a type II non-ST segment elevation myocardial infarction associated with her DKA.  I do think she should have a coronary artery CTA to better assess whether she has coronary disease and to what extent it might be.  Hypercoagulable work-up has been ordered.   For now she will stay on her Eliquis.  In addition an echocardiogram will be done to assess her left ventricular function.    Her last LDL done on September 20 was 81.  This is certainly not quite at goal.  I have increased her Lipitor to 20 mg daily and lipids and LFTs will be done in 3 months time.    Today her blood pressures in the office were a bit higher than I like to see them.  She was certainly anxious.  She will be coming in for a blood pressure check in the near future.  Her follow-up will depend on what we find.    Thank you for allowing me to participate in the care of this patient.  I hope this information is helpful.  Please do not hesitate to contact me with any questions or concerns.      Diamante Johnson MD FACC, FACP  10/4/2022

## 2022-10-04 NOTE — TELEPHONE ENCOUNTER
Patient needs auth for echo and us carotid     Location: [facility] [phone]    To be done October 18th 2022

## 2022-10-04 NOTE — LETTER
2022     Ruba Beavers MD  [address]  [facility] [address]    Patient: Annette Montes De Oca  YOB: 1970  Date of Visit: 10/4/2022      Dear Dr. Beavers:    Thank you for referring Annette Montes De Oca to me for evaluation. Below are my notes for this consultation.    If you have questions, please do not hesitate to call me. I look forward to following your patient along with you.         Sincerely,        Diamante Johnson MD        CC: No Recipients  Diamante Johnson MD  10/4/2022  7:01 PM  Signed                       Cardiology Consult/New Patient     Patient ID: Annette Montes De Oca 52 y.o. female. 1970  PCP: Ruba Beavers MD   History Present Illness     HPI:       Dear Ruba,    On 2022 I met Annette Montes De Oca in the office for the first time at your request.  She is a 52-year-old female with type 1 diabetes associated with retinopathy.  She also has hypothyroidism.  She was recently admitted to the hospital with DKA and was found to have a somewhat elevated troponin.  During that hospitalization she also developed an upper extremity DVT.  She is now here for cardiovascular evaluation.    Multiple family members have had strokes.  She just had a 59-year-old brother who  of a stroke.  Her great grandparents also had strokes.  Her father had a myocardial infarction at a young age and her mother has a pacemaker.    She does get occasional shortness of breath particularly when her blood sugars are up.  She denies chest discomfort, lightheadedness or syncope.    She does not smoke or drink alcohol.  She works in retail.  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  She tells me that her menstrual cycles are definitely changing.  She is allergic to codeine, cortisone and penicillin.  She has had an inguinal hernia repair, sinus surgery and toe surgery.    She denies liver disease, kidney disease, lung disease, GI disease or rheumatologic disease.  The rest of  her review of systems is completely negative.    Past History     Past Medical History:   Diagnosis Date    Diabetes mellitus type I (CMS/HCC)     Disease of thyroid gland     Grand mal seizure (CMS/HCC)      Past Surgical History:   Procedure Laterality Date    CARPAL TUNNEL RELEASE      FOOT SURGERY      HERNIA REPAIR      MOUTH SURGERY      OOPHORECTOMY      SINUS SURGERY      WISDOM TOOTH EXTRACTION       Social History     Tobacco Use    Smoking status: Never Smoker    Smokeless tobacco: Never Used   Vaping Use    Vaping Use: Never used   Substance Use Topics    Alcohol use: No    Drug use: No     Family History   Problem Relation Age of Onset    Stroke Biological Brother     Heart disease Maternal Grandmother     Kidney disease Maternal Grandmother     Prostate cancer Paternal Grandmother      Allergies/Medications   Allergies: Codeine, Cortisone, and Penicillins    Current Medications:   Outpatient Encounter Medications as of 10/4/2022:     atorvastatin (LIPITOR) 10 mg tablet, TAKE 1 TABLET BY MOUTH EVERY DAY    azelastine 0.15 % (205.5 mcg) nasal spray, ADMINISTER 1 SPRAY INTO EACH NOSTRIL 2 TIMES A DAY AS NEEDED FOR RHINITIS.    buPROPion XL (WELLBUTRIN XL) 150 mg 24 hr tablet, TAKE 1 TABLET BY MOUTH EVERY DAY    CLINPRO 5000 1.1 % paste, BRUSH TEETH FOR 1 MINUTE TWICE A DAY DO NOT RINSE, EAT OR DRINK FOR 30 MINS AFTER    enoxaparin (LOVENOX) 100 mg/mL syringe, Inject 0.9 mL (90 mg total) under the skin every 12 (twelve) hours.    fluticasone propionate (FLONASE) 50 mcg/actuation nasal spray, Administer 1 spray into each nostril daily.    insulin aspart (NovoLOG) 100 unit/mL patient supplied pump, Inject under the skin continuously.    insulin glargine U-100 (LANTUS) 100 unit/mL injection, INJECT 16 UNITS UNDER THE SKIN DAILY. INDICATIONS: DIABETES    levothyroxine (SYNTHROID) 75 mcg tablet, 1 TABLET ON AN EMPTY STOMACH IN THE MORNING    loratadine (CLARITIN) 10 mg tablet, TAKE  "1 TABLET BY MOUTH EVERY DAY    LOW-OGESTREL, 28, 0.3-30 mg-mcg per tablet, Take 1 tablet by mouth once daily.    magnesium oxide (MAG-OX) 400 mg (241.3 mg magnesium) tablet, TAKE 2 TABLET BY MOUTH DAILY FOR 30 DAYS. INDICATIONS: DISORDER IN NUTRITION    montelukast (SINGULAIR) 10 mg tablet, TAKE 1 TABLET BY MOUTH EVERY DAY AT NIGHT    PARoxetine 40 mg tablet, Take 1 tablet (40 mg total) by mouth once daily.    zolpidem (AMBIEN) 5 mg tablet, TAKE 1 TABLET BY MOUTH NIGHTLY AS NEEDED FOR SLEEP    blood-glucose transmitter (DEXCOM G6 TRANSMITTER) device, USE ONE TRANSMITTER WITH /SENSORS AND REPLACE EVERY 3 MONTHS    insulin regular U-100 (HumuLIN R,NovoLIN R) 100 unit/mL injection, inject by sliding scale with meals (according to carbohydrate scale)    NovoLIN N NPH U-100 Insulin 100 unit/mL injection, INJECT 40 UNITS SUBCUTANEOUS DAILY AT BEDTIME    ofloxacin (OCUFLOX) 0.3 % ophthalmic solution, 1 DROP TO OPERATIVE EYE 4X DAILY SATRING 3 DAYS PROIR TO SURGERY CONTINUE FOR 1 WEEK AFTER    prednisoLONE acetate (PRED FORTE) 1 % ophthalmic suspension, See admin instr.    PROLENSA 0.07 % drops, 1 DROP TO OPERATIVE EYE 4 TIMES DAILY 3 DAYS PRIOR TO SURGERY AND CONTINUING FOR 1 MONTH    [DISCONTINUED] apixaban (ELIQUIS) 5 mg tablet,     [DISCONTINUED] enoxaparin (LOVENOX) 120 mg/0.8 mL syringe, Inject 120 mg under the skin every 12 hours.    [DISCONTINUED] zolpidem (AMBIEN) 5 mg tablet, TAKE 1 TABLET BY MOUTH NIGHTLY AS NEEDED FOR SLEEP     ROS   Rest of her review of systems is completely negative.  Vitals:    10/04/22 1450   BP: (!) 146/78   BP Location: Left upper arm   Patient Position: Sitting   Pulse: 100   Resp: 18   SpO2: 99%   Height: 1.6 m (5' 3\")     Physical Exam   Pressure is 146/78.  Heart rate is 100 and regular.  She is comfortable.  Skin is warm and dry.  Conjunctiva are pink.  Affect is somewhat anxious.  No JVD or HJR.    Carotids are unremarkable.  Chest is clear.  Regular rhythm.  " Normal S1 and S2.  No obvious murmurs or gallops.  Heart tones are distant.  Abdomen is soft and mildly obese with good bowel sounds.  No obvious organomegaly.  No clubbing, cyanosis, or edema.  I felt no distal pulses.  No rash.  No obvious musculoskeletal deformity.  Labs/ Imaging/Procedures   Labs  Lab Results   Component Value Date    CHOL 125 03/23/2021    CHOL 149 05/04/2020     Lab Results   Component Value Date    HDL 43 03/23/2021    HDL 44 05/04/2020     Lab Results   Component Value Date    LDLCALC 68 03/23/2021    LDLCALC 87 05/04/2020     Lab Results   Component Value Date    TRIG 68 03/23/2021    TRIG 89 05/04/2020     Lab Results   Component Value Date    WBC 5.6 10/06/2017    HGB 14.3 10/06/2017    HCT 41.9 10/06/2017    MCV 91 10/06/2017     (H) 10/06/2017     Lab Results   Component Value Date    GLUCOSE 147 (H) 07/08/2022     07/08/2022    K 4.2 07/08/2022    CO2 21 07/08/2022     (H) 07/08/2022    BUN 14 07/08/2022    CREATININE 1.01 (H) 07/08/2022     Lab Results   Component Value Date    HGBA1C 8.5 (H) 07/08/2022         EKG  Today's EKG revealed sinus rhythm with a left atrial abnormality and a left axis deviation.  There was diffuse nonspecific inferior T wave flattening.  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  She had a type II non-ST segment elevation myocardial infarction associated with her DKA.  I do think she should have a coronary artery CTA to better assess whether she has coronary disease and to what extent it might be.  Hypercoagulable work-up has been ordered.  For now she will stay on her Eliquis.  In addition an echocardiogram will be done to assess her left ventricular function.    Her last LDL done on September 20 was 81.  This is certainly not quite at goal.  I have increased her Lipitor to 20 mg daily and lipids and LFTs will be done in 3 months time.    Today her blood pressures in the office were a bit higher than I like to see them.  She was certainly anxious.  She will be coming in for a blood pressure check in the near future.  Her follow-up will depend on what we find.    Thank you for allowing me to participate in the care of this patient.  I hope this information is helpful.  Please do not hesitate to contact me with any questions or concerns.      Diamante Johnson MD FACC, FACP  10/4/2022

## 2022-10-04 NOTE — TELEPHONE ENCOUNTER
Patient needs AUTH for CTA     Location: [facility] [phone]    [facility]

## 2022-10-05 ENCOUNTER — TELEPHONE (OUTPATIENT)
Dept: CARDIOLOGY | Facility: CLINIC | Age: 52
End: 2022-10-05
Payer: COMMERCIAL

## 2022-10-05 NOTE — TELEPHONE ENCOUNTER
Echo  Auth# [id number]        10/05/2022-12/03/2022      US Carotid  Auth# [id number]        10/05/2022-12/03/2022    CTA  Auth# [id number]      10/05/2022-12/03/2022    Please contact pt to provide auth# and assist with scheduling testing

## 2022-10-05 NOTE — TELEPHONE ENCOUNTER
----- Message from Diamante Johnson MD sent at 10/4/2022  7:00 PM EDT -----  Regarding: Pressure check  Needs to come in sometime in the near future for blood pressure check with Jluis.

## 2022-10-05 NOTE — TELEPHONE ENCOUNTER
LVM informing the patient that  would like her to come in for a BP check. Provided call back number for her to call to schedule a bp check

## 2022-10-06 NOTE — TELEPHONE ENCOUNTER
Called and LVM for the patient informing her that the authorizations were received for the echo, Us and CTA. Provided the authorization numbers for all tests. The call back number for our office to schedule the echo and us. And central scheduling number for her to call to schedule the CTA

## 2022-10-07 LAB
CARDIOLIPIN IGG SER IA-ACNC: <9 GPL U/ML (ref 0–14)
CARDIOLIPIN IGM SER IA-ACNC: <9 MPL U/ML (ref 0–12)

## 2022-10-08 LAB
APTT SCREEN TO CONFIRM RATIO: 1.13 RATIO (ref 0–1.34)
AT III ACT/NOR PPP CHRO: 121 % (ref 75–135)
CONFIRM APTT/NORMAL: 40.2 SEC (ref 0–47.6)
LA 2 SCREEN W REFLEX-IMP: ABNORMAL
PROT C ACT/NOR PPP: 130 % (ref 73–180)
PROT S ACT/NOR PPP: 79 % (ref 63–140)
SCREEN APTT: 47.7 SEC (ref 0–51.9)
SCREEN DRVVT: 46.5 SEC (ref 0–47)
THROMBIN TIME: 23.2 SEC (ref 0–23)

## 2022-10-12 ENCOUNTER — TELEPHONE (OUTPATIENT)
Dept: HEMATOLOGY/ONCOLOGY | Facility: CLINIC | Age: 52
End: 2022-10-12
Payer: COMMERCIAL

## 2022-10-12 LAB
F2 C.20210G>A GENO BLD/T: NORMAL
F5 P.R506Q BLD/T QL: NORMAL

## 2022-10-12 NOTE — TELEPHONE ENCOUNTER
Please call patient.  All testing for hypercoagulable state was normal.  No evidence of underlying hypercoagulable condition.  Patient should complete 3 months of anticoagulation for her provoked upper extremity DVT.  She will finish this in early November.  No need to follow-up with hematology unless new concerns arise.

## 2022-10-27 ENCOUNTER — OFFICE VISIT (OUTPATIENT)
Dept: PRIMARY CARE | Facility: CLINIC | Age: 52
End: 2022-10-27
Payer: COMMERCIAL

## 2022-10-27 VITALS
BODY MASS INDEX: 31 KG/M2 | SYSTOLIC BLOOD PRESSURE: 124 MMHG | HEART RATE: 90 BPM | OXYGEN SATURATION: 99 % | TEMPERATURE: 98.3 F | DIASTOLIC BLOOD PRESSURE: 76 MMHG | WEIGHT: 175 LBS | RESPIRATION RATE: 14 BRPM

## 2022-10-27 DIAGNOSIS — J01.00 ACUTE NON-RECURRENT MAXILLARY SINUSITIS: Primary | ICD-10-CM

## 2022-10-27 DIAGNOSIS — E10.10 DIABETIC KETOACIDOSIS WITHOUT COMA ASSOCIATED WITH TYPE 1 DIABETES MELLITUS (CMS/HCC): ICD-10-CM

## 2022-10-27 DIAGNOSIS — E10.65 TYPE 1 DIABETES MELLITUS WITH HYPERGLYCEMIA (CMS/HCC): ICD-10-CM

## 2022-10-27 PROCEDURE — 3008F BODY MASS INDEX DOCD: CPT | Performed by: NURSE PRACTITIONER

## 2022-10-27 PROCEDURE — 99213 OFFICE O/P EST LOW 20 MIN: CPT | Performed by: NURSE PRACTITIONER

## 2022-10-27 RX ORDER — DOXYCYCLINE HYCLATE 100 MG
100 TABLET ORAL 2 TIMES DAILY
Qty: 14 TABLET | Refills: 0 | Status: SHIPPED | OUTPATIENT
Start: 2022-10-27 | End: 2022-11-08

## 2022-10-27 ASSESSMENT — ENCOUNTER SYMPTOMS
SINUS PRESSURE: 1
ADENOPATHY: 0
LIGHT-HEADEDNESS: 0
CHILLS: 0
VOMITING: 0
FATIGUE: 0
WHEEZING: 0
DIARRHEA: 0
HEADACHES: 0
SINUS PAIN: 1
CHEST TIGHTNESS: 0
COUGH: 1
PALPITATIONS: 0
FEVER: 0
WEAKNESS: 0
NAUSEA: 1
SHORTNESS OF BREATH: 0

## 2022-10-27 NOTE — ASSESSMENT & PLAN NOTE
S/s consistent with ABRS,   Initiate antibiotic treatment,   Supportive care reviewed: recommend rest, adequate hydration, saline nasal spray, OTC medications as needed.   Follow up precautions reviewed, pt to contact office if any new or worsening symptoms.

## 2022-10-27 NOTE — PROGRESS NOTES
[address], [address]  [place] [address]  Phone [phone] Fax [phone]     Reason for visit: No chief complaint on file.     HPI   Annette Montes De Oca is a 52 y.o. female who presents with nasal congestion, sinus pressure, post nasal drip, cough, and L ear pain/pressure x 1 week.  Associated symptoms include nausea and decreased appetite. No v/d/bowel changes.  No fever/chills.  Blood glucose elevated, up to 300s,   She has f/u scheduled w/ endocrinologist.  Taking flonase daily        Medical History:  Past Medical History:   Diagnosis Date    Diabetes mellitus type I (CMS/HCC)     Disease of thyroid gland     Grand mal seizure (CMS/HCC)        Surgical History:  Past Surgical History:   Procedure Laterality Date    CARPAL TUNNEL RELEASE      FOOT SURGERY      HERNIA REPAIR      MOUTH SURGERY      OOPHORECTOMY      SINUS SURGERY      WISDOM TOOTH EXTRACTION         Social History:  Social History     Social History Narrative    Raf petit       Family History:  Family History   Problem Relation Age of Onset    Stroke Biological Brother     Heart disease Maternal Grandmother     Kidney disease Maternal Grandmother     Prostate cancer Paternal Grandmother        Allergies:  Codeine, Cortisone, and Penicillins    Current Medications:  Current Outpatient Medications   Medication Sig Dispense Refill    doxycycline hyclate (VIBRA-TABS) 100 mg tablet Take 1 tablet (100 mg total) by mouth 2 (two) times a day for 7 days. 14 tablet 0    atorvastatin (LIPITOR) 20 mg tablet Take 1 tablet (20 mg total) by mouth daily. 90 tablet 3    azelastine 0.15 % (205.5 mcg) nasal spray ADMINISTER 1 SPRAY INTO EACH NOSTRIL 2 TIMES A DAY AS NEEDED FOR RHINITIS. 30 mL 4    blood-glucose transmitter (DEXCOM G6 TRANSMITTER) device USE ONE TRANSMITTER WITH /SENSORS AND REPLACE EVERY 3 MONTHS      buPROPion XL (WELLBUTRIN XL) 150 mg 24 hr tablet TAKE 1 TABLET BY MOUTH  EVERY DAY 30 tablet 6    CLINPRO 5000 1.1 % paste BRUSH TEETH FOR 1 MINUTE TWICE A DAY DO NOT RINSE, EAT OR DRINK FOR 30 MINS AFTER      enoxaparin (LOVENOX) 100 mg/mL syringe Inject 0.9 mL (90 mg total) under the skin every 12 (twelve) hours. 108 mL 0    fluticasone propionate (FLONASE) 50 mcg/actuation nasal spray Administer 1 spray into each nostril daily.      insulin aspart (NovoLOG) 100 unit/mL patient supplied pump Inject under the skin continuously.      insulin glargine U-100 (LANTUS) 100 unit/mL injection INJECT 16 UNITS UNDER THE SKIN DAILY. INDICATIONS: DIABETES      insulin regular U-100 (HumuLIN R,NovoLIN R) 100 unit/mL injection inject by sliding scale with meals (according to carbohydrate scale)      levothyroxine (SYNTHROID) 75 mcg tablet 1 TABLET ON AN EMPTY STOMACH IN THE MORNING  3    loratadine (CLARITIN) 10 mg tablet TAKE 1 TABLET BY MOUTH EVERY DAY 30 tablet 3    LOW-OGESTREL, 28, 0.3-30 mg-mcg per tablet Take 1 tablet by mouth once daily.      magnesium oxide (MAG-OX) 400 mg (241.3 mg magnesium) tablet TAKE 2 TABLET BY MOUTH DAILY FOR 30 DAYS. INDICATIONS: DISORDER IN NUTRITION      montelukast (SINGULAIR) 10 mg tablet TAKE 1 TABLET BY MOUTH EVERY DAY AT NIGHT 30 tablet 5    NovoLIN N NPH U-100 Insulin 100 unit/mL injection INJECT 40 UNITS SUBCUTANEOUS DAILY AT BEDTIME      ofloxacin (OCUFLOX) 0.3 % ophthalmic solution 1 DROP TO OPERATIVE EYE 4X DAILY SATRING 3 DAYS PROIR TO SURGERY CONTINUE FOR 1 WEEK AFTER      PARoxetine 40 mg tablet Take 1 tablet (40 mg total) by mouth once daily. 90 tablet 6    prednisoLONE acetate (PRED FORTE) 1 % ophthalmic suspension See admin instr.      PROLENSA 0.07 % drops 1 DROP TO OPERATIVE EYE 4 TIMES DAILY 3 DAYS PRIOR TO SURGERY AND CONTINUING FOR 1 MONTH      zolpidem (AMBIEN) 5 mg tablet TAKE 1 TABLET BY MOUTH NIGHTLY AS NEEDED FOR SLEEP 30 tablet 1     No current facility-administered medications for this visit.       Review of  Systems:  Review of Systems   Constitutional: Negative for chills, fatigue and fever.   HENT: Positive for congestion, ear pain, sinus pressure and sinus pain.    Respiratory: Positive for cough. Negative for chest tightness, shortness of breath and wheezing.    Cardiovascular: Negative for chest pain and palpitations.   Gastrointestinal: Positive for nausea. Negative for diarrhea and vomiting.   Neurological: Negative for weakness, light-headedness and headaches.   Hematological: Negative for adenopathy.       Objective     Vital Signs:  Vitals:    10/27/22 1310   BP: 124/76   Pulse: 90   Resp: 14   Temp: 36.8 °C (98.3 °F)   SpO2: 99%       BMI:  Body mass index is 31 kg/m².     Physical Exam  Vitals reviewed.   Constitutional:       Appearance: Normal appearance.   HENT:      Head: Normocephalic and atraumatic.      Right Ear: Tympanic membrane and ear canal normal.      Left Ear: Ear canal normal. A middle ear effusion is present.      Nose: Congestion (erythema and inflammation of nasal turbinates, +purulent nasal discharge, +maxillary sinus ttp) present.   Cardiovascular:      Heart sounds: Normal heart sounds.   Pulmonary:      Effort: Pulmonary effort is normal.      Breath sounds: Normal breath sounds. No wheezing.   Neurological:      Mental Status: She is alert and oriented to person, place, and time.   Psychiatric:         Mood and Affect: Mood normal.         Behavior: Behavior normal.         Recent labs before today:     Lab Results   Component Value Date    WBC 5.6 10/06/2017    HGB 14.3 10/06/2017    HCT 41.9 10/06/2017     (H) 10/06/2017    CHOL 125 03/23/2021    TRIG 68 03/23/2021    HDL 43 03/23/2021    ALT 7 07/08/2022    AST 11 07/08/2022     07/08/2022    K 4.2 07/08/2022     (H) 07/08/2022    CREATININE 1.01 (H) 07/08/2022    BUN 14 07/08/2022    CO2 21 07/08/2022    TSH 1.810 07/08/2022    HGBA1C 8.5 (H) 07/08/2022    MICROALBUR 3.6 07/08/2022        Procedures        Problem List Items Addressed This Visit        Endocrine/Metabolic    Type 1 diabetes mellitus with hyperglycemia (CMS/HCC)     Continue to monitor blood glucose.  Follow up scheduled with endocrinologist.         Diabetic ketoacidosis without coma associated with type 1 diabetes mellitus (CMS/HCC)       Infectious/Inflammatory    Acute non-recurrent maxillary sinusitis - Primary     S/s consistent with ABRS,   Initiate antibiotic treatment,   Supportive care reviewed: recommend rest, adequate hydration, saline nasal spray, OTC medications as needed.   Follow up precautions reviewed, pt to contact office if any new or worsening symptoms.                     CLINTON Caraballo  10/27/2022

## 2022-10-28 ENCOUNTER — TELEPHONE (OUTPATIENT)
Dept: PRIMARY CARE | Facility: CLINIC | Age: 52
End: 2022-10-28
Payer: COMMERCIAL

## 2022-10-28 RX ORDER — AZITHROMYCIN 250 MG/1
TABLET, FILM COATED ORAL
Qty: 6 TABLET | Refills: 0 | Status: SHIPPED | OUTPATIENT
Start: 2022-10-28 | End: 2022-11-08

## 2022-10-28 NOTE — TELEPHONE ENCOUNTER
Annette is having a reaction to the doxycycline, she started with diarrhea the first day she took the medication. Diarrhea has gotten more severe, she is stopping the medication. Please advise/

## 2022-11-08 ENCOUNTER — TELEPHONE (OUTPATIENT)
Dept: PRIMARY CARE | Facility: CLINIC | Age: 52
End: 2022-11-08
Payer: COMMERCIAL

## 2022-11-08 ENCOUNTER — OFFICE VISIT (OUTPATIENT)
Dept: CARDIOLOGY | Facility: CLINIC | Age: 52
End: 2022-11-08
Payer: COMMERCIAL

## 2022-11-08 VITALS
DIASTOLIC BLOOD PRESSURE: 70 MMHG | HEART RATE: 94 BPM | HEIGHT: 63 IN | RESPIRATION RATE: 16 BRPM | SYSTOLIC BLOOD PRESSURE: 138 MMHG | WEIGHT: 170 LBS | OXYGEN SATURATION: 99 % | BODY MASS INDEX: 30.12 KG/M2

## 2022-11-08 DIAGNOSIS — E10.65 TYPE 1 DIABETES MELLITUS WITH HYPERGLYCEMIA (CMS/HCC): ICD-10-CM

## 2022-11-08 DIAGNOSIS — E78.00 PURE HYPERCHOLESTEROLEMIA: ICD-10-CM

## 2022-11-08 DIAGNOSIS — I82.621 ACUTE DEEP VEIN THROMBOSIS (DVT) OF BRACHIAL VEIN OF RIGHT UPPER EXTREMITY (CMS/HCC): Primary | ICD-10-CM

## 2022-11-08 DIAGNOSIS — I21.4 NSTEMI (NON-ST ELEVATED MYOCARDIAL INFARCTION) (CMS/HCC): ICD-10-CM

## 2022-11-08 DIAGNOSIS — E10.10 DIABETIC KETOACIDOSIS WITHOUT COMA ASSOCIATED WITH TYPE 1 DIABETES MELLITUS (CMS/HCC): ICD-10-CM

## 2022-11-08 PROCEDURE — 3008F BODY MASS INDEX DOCD: CPT | Performed by: INTERNAL MEDICINE

## 2022-11-08 PROCEDURE — 99212 OFFICE O/P EST SF 10 MIN: CPT | Performed by: INTERNAL MEDICINE

## 2022-11-08 NOTE — LETTER
"November 8, 2022     Ruba Beavers MD  [address] [facility]  [facility] [address]    Patient: Annette Montes De Oca  YOB: 1970  Date of Visit: 11/8/2022      Dear Dr. Beavers:    Thank you for referring Annette Montes De Oca to me for evaluation. Below are my notes for this consultation.    If you have questions, please do not hesitate to call me. I look forward to following your patient along with you.         Sincerely,        Diamante Johnson MD        CC: No Recipients  Diamante Johnson MD  11/8/2022  3:12 PM  Signed       Diamante Johnson MD       Reason for visit : Follow up  HPI   Annette Montes De Oca is a 52 y.o. female who presents to the office for cardiovascular follow up.      Dear Ruba,    On November 8, 2022 I saw Annette Montes De Oca in the office in follow-up.  As you know I originally saw her on October 4, 2022.  She was in the hospital with DKA and had a type II myocardial infarction.  She has diabetes with retinopathy, hypothyroidism, and hyperlipidemia.  Her last LDL was higher than it should be at 81 and her Lipitor was increased.  She has repeat lipids and LFTs scheduled in 3 months.  She had negative precordial T waves when she was here and it was my suggestion that she have a coronary artery CTA to better assess her coronary anatomy.  In addition it was my advice that she have an echocardiogram to assess her heart structurally.  Neither of these studies have been scheduled.  She tells me that she has been \"busy\".  She is now here for blood pressure check.    As you know she had a DVT in her upper extremity.  She has stopped both her Lovenox and Eliquis.  She tells me that no one gave her instructions to stop this.    She denies chest pain, shortness of breath, palpitations, lightheadedness or syncope.    I have reviewed her allergies, medications, family history, social history, medical history, and surgical history.  None of this is changed since I seen her last.  The rest " of her review of systems is completely negative.           Problem List:  2022-10: Acute non-recurrent maxillary sinusitis  2022-10: Pure hypercholesterolemia  2022-09: Family history of cerebrovascular accident (CVA)  2022-09: Cataract of right eye  2022-08: Acute deep vein thrombosis (DVT) of brachial vein of right   upper extremity (CMS/[facility])  2022-08: NSTEMI (non-ST elevated myocardial infarction) (CMS/[facility])  2022-08: Diabetic ketoacidosis without coma associated with type 1   diabetes mellitus (CMS/[facility])  2022-08: Syncope  2022-07: Right acute otitis media  2021-04: Type 1 diabetes mellitus with hyperglycemia (CMS/[facility])  2021-04: Stable proliferative diabetic retinopathy of both eyes   associated with type 1 diabetes mellitus (CMS/[facility])  2020-10: Seasonal allergies  2019-07: Dysuria  2019-01: Paronychia of toe of left foot  2018-09: Abdominal wall abscess  2015-05: Anxiety  2015-05: Hypothyroid  2015-05: Iron deficiency anemia  2015-05: Type 1 diabetes mellitus (CMS/[facility])           Current Outpatient Medications   Medication Sig    atorvastatin (LIPITOR) 20 mg tablet Take 1 tablet (20 mg total) by mouth daily.    azelastine 0.15 % (205.5 mcg) nasal spray ADMINISTER 1 SPRAY INTO EACH NOSTRIL 2 TIMES A DAY AS NEEDED FOR RHINITIS.    blood-glucose transmitter (DEXCOM G6 TRANSMITTER) device USE ONE TRANSMITTER WITH /SENSORS AND REPLACE EVERY 3 MONTHS    buPROPion XL (WELLBUTRIN XL) 150 mg 24 hr tablet TAKE 1 TABLET BY MOUTH EVERY DAY    CLINPRO 5000 1.1 % paste BRUSH TEETH FOR 1 MINUTE TWICE A DAY DO NOT RINSE, EAT OR DRINK FOR 30 MINS AFTER    fluticasone propionate (FLONASE) 50 mcg/actuation nasal spray Administer 1 spray into each nostril daily.    insulin aspart (NovoLOG) 100 unit/mL patient supplied pump Inject under the skin continuously.    insulin glargine U-100 (LANTUS) 100 unit/mL injection INJECT 16 UNITS UNDER THE SKIN DAILY. INDICATIONS: DIABETES    levothyroxine (SYNTHROID) 75 mcg tablet 1  "TABLET ON AN EMPTY STOMACH IN THE MORNING    loratadine (CLARITIN) 10 mg tablet TAKE 1 TABLET BY MOUTH EVERY DAY    LOW-OGESTREL, 28, 0.3-30 mg-mcg per tablet Take 1 tablet by mouth once daily.    montelukast (SINGULAIR) 10 mg tablet TAKE 1 TABLET BY MOUTH EVERY DAY AT NIGHT    PARoxetine 40 mg tablet Take 1 tablet (40 mg total) by mouth once daily.    zolpidem (AMBIEN) 5 mg tablet TAKE 1 TABLET BY MOUTH NIGHTLY AS NEEDED FOR SLEEP    NovoLIN N NPH U-100 Insulin 100 unit/mL injection INJECT 40 UNITS SUBCUTANEOUS DAILY AT BEDTIME     No current facility-administered medications for this visit.          Allergies:  Codeine, Cortisone, and Penicillins    Surgical History:  Past Surgical History:   Procedure Laterality Date    CARPAL TUNNEL RELEASE      FOOT SURGERY      HERNIA REPAIR      MOUTH SURGERY      OOPHORECTOMY      SINUS SURGERY      WISDOM TOOTH EXTRACTION          Family History:  Family History   Problem Relation Age of Onset    Stroke Biological Brother     Heart disease Maternal Grandmother     Kidney disease Maternal Grandmother     Prostate cancer Paternal Grandmother         Social History:  Social History     Socioeconomic History    Marital status:      Spouse name: None    Number of children: None    Years of education: None    Highest education level: None   Tobacco Use    Smoking status: Never    Smokeless tobacco: Never   Vaping Use    Vaping Use: Never used   Substance and Sexual Activity    Alcohol use: No    Drug use: No    Sexual activity: Defer   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       Objective   Vitals:    11/08/22 1429   BP: 138/70   BP Location: Left upper arm   Patient Position: Sitting   Pulse: 94   Resp: 16   SpO2: 99%   Weight: 77.1 kg (170 lb)   Height: 1.6 m (5' 3\")     Physical Exam  Blood pressure is 138/70.  Heart rate was 94 and regular.  She is " comfortable.  Skin is warm and dry.  Conjunctiva are pink.  Affect was anxious.  No JVD or HJR.  Carotids are unremarkable.  Chest is clear.  Regular rhythm.  Normal S1 and S2.  No murmurs or gallops noted.    Abdomen soft with good bowel sounds.  No organomegaly.  No clubbing, cyanosis, or edema.  I felt no distal pulses.  No rash.  No obvious musculoskeletal deformity.       Labs:   Lab Results   Component Value Date    WBC 5.6 10/06/2017    HGB 14.3 10/06/2017    HCT 41.9 10/06/2017     (H) 10/06/2017    CHOL 125 03/23/2021    TRIG 68 03/23/2021    HDL 43 03/23/2021    LDLCALC 68 03/23/2021    ALT 7 07/08/2022    AST 11 07/08/2022     07/08/2022    K 4.2 07/08/2022     (H) 07/08/2022    CREATININE 1.01 (H) 07/08/2022    BUN 14 07/08/2022    CO2 21 07/08/2022    TSH 1.810 07/08/2022    HGBA1C 8.5 (H) 07/08/2022                  Problem List Items Addressed This Visit        Circulatory    Acute deep vein thrombosis (DVT) of brachial vein of right upper extremity (CMS/HCC) - Primary    NSTEMI (non-ST elevated myocardial infarction) (CMS/HCC)       Endocrine/Metabolic    Type 1 diabetes mellitus with hyperglycemia (CMS/HCC)    Diabetic ketoacidosis without coma associated with type 1 diabetes mellitus (CMS/HCC)       Other    Pure hypercholesterolemia       Impression:    I am happy that her blood pressures are normal.  I had a discussion with her and explained that the studies that were ordered are important for me to help guide her through her cardiovascular care.  She tells me that she plans on getting them done.  In the meantime there is not much else for me to do until I see the results of her CTA and echo.    It is unclear to me as to whether she should really be off her systemic anticoagulation.  I told her it was my advice that she discuss this with you.    I thank you for allowing me to participate in the care of your patient.  If I can furnish you with any further details, please do not  hesitate to contact me.     Diamante Johnson MD  11/8/2022    This document was generated utilizing voice recognition technology. A reasonable attempt at proofreading has been made to minimize errors but please excuse any typographical errors which may be present. Please call with any questions.

## 2022-11-08 NOTE — PROGRESS NOTES
"     Diamante Johnson MD       Reason for visit : Follow up  HPI   Annette Montes De Oca is a 52 y.o. female who presents to the office for cardiovascular follow up.      Dear Ruba,    On November 8, 2022 I saw Annette Montes De Oca in the office in follow-up.  As you know I originally saw her on October 4, 2022.  She was in the hospital with DKA and had a type II myocardial infarction.  She has diabetes with retinopathy, hypothyroidism, and hyperlipidemia.  Her last LDL was higher than it should be at 81 and her Lipitor was increased.  She has repeat lipids and LFTs scheduled in 3 months.  She had negative precordial T waves when she was here and it was my suggestion that she have a coronary artery CTA to better assess her coronary anatomy.  In addition it was my advice that she have an echocardiogram to assess her heart structurally.  Neither of these studies have been scheduled.  She tells me that she has been \"busy\".  She is now here for blood pressure check.    As you know she had a DVT in her upper extremity.  She has stopped both her Lovenox and Eliquis.  She tells me that no one gave her instructions to stop this.    She denies chest pain, shortness of breath, palpitations, lightheadedness or syncope.    I have reviewed her allergies, medications, family history, social history, medical history, and surgical history.  None of this is changed since I seen her last.  The rest of her review of systems is completely negative.           Problem List:  2022-10: Acute non-recurrent maxillary sinusitis  2022-10: Pure hypercholesterolemia  2022-09: Family history of cerebrovascular accident (CVA)  2022-09: Cataract of right eye  2022-08: Acute deep vein thrombosis (DVT) of brachial vein of right   upper extremity (CMS/HCC)  2022-08: NSTEMI (non-ST elevated myocardial infarction) (CMS/[facility])  2022-08: Diabetic ketoacidosis without coma associated with type 1   diabetes mellitus (CMS/[facility])  2022-08: Syncope  2022-07: Right acute " otitis media  2021-04: Type 1 diabetes mellitus with hyperglycemia (CMS/[facility])  2021-04: Stable proliferative diabetic retinopathy of both eyes   associated with type 1 diabetes mellitus (CMS/[facility])  2020-10: Seasonal allergies  2019-07: Dysuria  2019-01: Paronychia of toe of left foot  2018-09: Abdominal wall abscess  2015-05: Anxiety  2015-05: Hypothyroid  2015-05: Iron deficiency anemia  2015-05: Type 1 diabetes mellitus (CMS/[facility])           Current Outpatient Medications   Medication Sig    atorvastatin (LIPITOR) 20 mg tablet Take 1 tablet (20 mg total) by mouth daily.    azelastine 0.15 % (205.5 mcg) nasal spray ADMINISTER 1 SPRAY INTO EACH NOSTRIL 2 TIMES A DAY AS NEEDED FOR RHINITIS.    blood-glucose transmitter (DEXCOM G6 TRANSMITTER) device USE ONE TRANSMITTER WITH /SENSORS AND REPLACE EVERY 3 MONTHS    buPROPion XL (WELLBUTRIN XL) 150 mg 24 hr tablet TAKE 1 TABLET BY MOUTH EVERY DAY    CLINPRO 5000 1.1 % paste BRUSH TEETH FOR 1 MINUTE TWICE A DAY DO NOT RINSE, EAT OR DRINK FOR 30 MINS AFTER    fluticasone propionate (FLONASE) 50 mcg/actuation nasal spray Administer 1 spray into each nostril daily.    insulin aspart (NovoLOG) 100 unit/mL patient supplied pump Inject under the skin continuously.    insulin glargine U-100 (LANTUS) 100 unit/mL injection INJECT 16 UNITS UNDER THE SKIN DAILY. INDICATIONS: DIABETES    levothyroxine (SYNTHROID) 75 mcg tablet 1 TABLET ON AN EMPTY STOMACH IN THE MORNING    loratadine (CLARITIN) 10 mg tablet TAKE 1 TABLET BY MOUTH EVERY DAY    LOW-OGESTREL, 28, 0.3-30 mg-mcg per tablet Take 1 tablet by mouth once daily.    montelukast (SINGULAIR) 10 mg tablet TAKE 1 TABLET BY MOUTH EVERY DAY AT NIGHT    PARoxetine 40 mg tablet Take 1 tablet (40 mg total) by mouth once daily.    zolpidem (AMBIEN) 5 mg tablet TAKE 1 TABLET BY MOUTH NIGHTLY AS NEEDED FOR SLEEP    NovoLIN N NPH U-100 Insulin 100 unit/mL injection INJECT 40 UNITS SUBCUTANEOUS DAILY AT BEDTIME     No  "current facility-administered medications for this visit.          Allergies:  Codeine, Cortisone, and Penicillins    Surgical History:  Past Surgical History:   Procedure Laterality Date    CARPAL TUNNEL RELEASE      FOOT SURGERY      HERNIA REPAIR      MOUTH SURGERY      OOPHORECTOMY      SINUS SURGERY      WISDOM TOOTH EXTRACTION          Family History:  Family History   Problem Relation Age of Onset    Stroke Biological Brother     Heart disease Maternal Grandmother     Kidney disease Maternal Grandmother     Prostate cancer Paternal Grandmother         Social History:  Social History     Socioeconomic History    Marital status:      Spouse name: None    Number of children: None    Years of education: None    Highest education level: None   Tobacco Use    Smoking status: Never    Smokeless tobacco: Never   Vaping Use    Vaping Use: Never used   Substance and Sexual Activity    Alcohol use: No    Drug use: No    Sexual activity: Defer   Social History Narrative    [employer]           Review of Systems    The rest of her review of systems is completely negative.       Objective   Vitals:    11/08/22 1429   BP: 138/70   BP Location: Left upper arm   Patient Position: Sitting   Pulse: 94   Resp: 16   SpO2: 99%   Weight: 77.1 kg (170 lb)   Height: 1.6 m (5' 3\")     Physical Exam  Blood pressure is 138/70.  Heart rate was 94 and regular.  She is comfortable.  Skin is warm and dry.  Conjunctiva are pink.  Affect was anxious.  No JVD or HJR.  Carotids are unremarkable.  Chest is clear.  Regular rhythm.  Normal S1 and S2.  No murmurs or gallops noted.    Abdomen soft with good bowel sounds.  No organomegaly.  No clubbing, cyanosis, or edema.  I felt no distal pulses.  No rash.  No obvious musculoskeletal deformity.       Labs:   Lab Results   Component Value Date    WBC 5.6 10/06/2017    HGB 14.3 10/06/2017    HCT 41.9 10/06/2017     (H) 10/06/2017    CHOL 125 03/23/2021    TRIG 68 " 03/23/2021    HDL 43 03/23/2021    LDLCALC 68 03/23/2021    ALT 7 07/08/2022    AST 11 07/08/2022     07/08/2022    K 4.2 07/08/2022     (H) 07/08/2022    CREATININE 1.01 (H) 07/08/2022    BUN 14 07/08/2022    CO2 21 07/08/2022    TSH 1.810 07/08/2022    HGBA1C 8.5 (H) 07/08/2022                  Problem List Items Addressed This Visit        Circulatory    Acute deep vein thrombosis (DVT) of brachial vein of right upper extremity (CMS/[facility]) - Primary    NSTEMI (non-ST elevated myocardial infarction) (CMS/[facility])       Endocrine/Metabolic    Type 1 diabetes mellitus with hyperglycemia (CMS/[facility])    Diabetic ketoacidosis without coma associated with type 1 diabetes mellitus (CMS/[facility])       Other    Pure hypercholesterolemia       Impression:    I am happy that her blood pressures are normal.  I had a discussion with her and explained that the studies that were ordered are important for me to help guide her through her cardiovascular care.  She tells me that she plans on getting them done.  In the meantime there is not much else for me to do until I see the results of her CTA and echo.    It is unclear to me as to whether she should really be off her systemic anticoagulation.  I told her it was my advice that she discuss this with you.    I thank you for allowing me to participate in the care of your patient.  If I can furnish you with any further details, please do not hesitate to contact me.     Diamante Johnson MD  11/8/2022    This document was generated utilizing voice recognition technology. A reasonable attempt at proofreading has been made to minimize errors but please excuse any typographical errors which may be present. Please call with any questions.

## 2022-11-08 NOTE — TELEPHONE ENCOUNTER
Pt scheduled echo at [facility].  Pt wanted to have US at [place], auth location updated.

## 2022-11-09 ENCOUNTER — TELEPHONE (OUTPATIENT)
Dept: HEMATOLOGY/ONCOLOGY | Facility: CLINIC | Age: 52
End: 2022-11-09
Payer: COMMERCIAL

## 2022-11-09 ENCOUNTER — TELEPHONE (OUTPATIENT)
Dept: PRIMARY CARE | Facility: CLINIC | Age: 52
End: 2022-11-09
Payer: COMMERCIAL

## 2022-11-09 NOTE — TELEPHONE ENCOUNTER
"Talked to patient. She states she was the cardiologist yesterday who told her to reach out to Dr. Beavers regarding lovenox.   Patient saw hematology and stated , \" Recommend she complete a total of 3 months of anticoagulation which she will finish in early November.\" Patient states she did complete the 3 months, but when she saw the cardiologist it was discussed maybe she should be still on it and that it was up to Dr. Beavers if she needed to continue medication. Patient was states she was discharged from hematology as well.   Patient confused to if she needs to be on anticoagulation due to her conversation with cardiologist yesterday.   Please advice.     "

## 2022-11-09 NOTE — TELEPHONE ENCOUNTER
Annette recently saw her hematologist, who recommended that she stop taking the Lovenox she has been on since August. However, her cardiologist recommends that she keep taking it. Annette wanted to see what Dr Beavers would recommend.

## 2022-11-09 NOTE — TELEPHONE ENCOUNTER
Called and left message for patient to return call. Hematologist stated 3 months of anticoagulation and to discontinue early November. Awaiting call back to discuss

## 2022-11-09 NOTE — TELEPHONE ENCOUNTER
Talked to patient, aware completed anticoagulation therapy at this time and does not need to continue. Patient states understanding.

## 2022-11-11 ENCOUNTER — OFFICE VISIT (OUTPATIENT)
Dept: PRIMARY CARE | Facility: CLINIC | Age: 52
End: 2022-11-11
Payer: COMMERCIAL

## 2022-11-11 VITALS
SYSTOLIC BLOOD PRESSURE: 128 MMHG | RESPIRATION RATE: 18 BRPM | HEIGHT: 63 IN | HEART RATE: 93 BPM | TEMPERATURE: 97.3 F | OXYGEN SATURATION: 99 % | DIASTOLIC BLOOD PRESSURE: 70 MMHG | BODY MASS INDEX: 31.01 KG/M2 | WEIGHT: 175 LBS

## 2022-11-11 DIAGNOSIS — L03.311 CELLULITIS OF ABDOMINAL WALL: Primary | ICD-10-CM

## 2022-11-11 PROCEDURE — 3008F BODY MASS INDEX DOCD: CPT | Performed by: NURSE PRACTITIONER

## 2022-11-11 PROCEDURE — 99213 OFFICE O/P EST LOW 20 MIN: CPT | Performed by: NURSE PRACTITIONER

## 2022-11-11 RX ORDER — SULFAMETHOXAZOLE AND TRIMETHOPRIM 800; 160 MG/1; MG/1
1 TABLET ORAL 2 TIMES DAILY
Qty: 14 TABLET | Refills: 0 | Status: SHIPPED | OUTPATIENT
Start: 2022-11-11 | End: 2022-11-18

## 2022-11-11 RX ORDER — MUPIROCIN 20 MG/G
1 OINTMENT TOPICAL 2 TIMES DAILY
Qty: 22 G | Refills: 0 | Status: SHIPPED | OUTPATIENT
Start: 2022-11-11 | End: 2022-11-18

## 2022-11-11 ASSESSMENT — ENCOUNTER SYMPTOMS
FATIGUE: 0
ADENOPATHY: 0
HEADACHES: 0
COUGH: 0
SHORTNESS OF BREATH: 0
CHILLS: 0
FEVER: 0
COLOR CHANGE: 1
VOMITING: 0
NAUSEA: 0
WEAKNESS: 0
DIZZINESS: 0

## 2022-11-11 NOTE — ASSESSMENT & PLAN NOTE
Start oral antibiotics, warm compresses.  Strict ER precautions reviewed- pt advised to go to ER if any increased redness, swelling, fever, or chills. She verbalizes understanding.

## 2022-11-11 NOTE — PROGRESS NOTES
[address], [address]  [place] [address]  Phone [phone] Fax [phone]     Reason for visit:   Chief Complaint   Patient presents with    Rash     On r side of stomach , sore and itchy 2 days ago      HPI   Annette Montes De Oca is a 52 y.o. female who presents with area of redness and swelling R lower abdomen x 2 days at site of insulin injection. Worse in last 24 hr.  She started applying neosporin.  She reports similar s/s in the past- seen at ER for cellulitis of abdominal wall 12/2020.  She notes increased redness yesterday.  No fever/chills/weakness.  Blood glucose is elevated    Prev on lovenox , recently d/c by hematologist. Bruising on lower abd as well.        Medical History:  Past Medical History:   Diagnosis Date    Diabetes mellitus type I (CMS/HCC)     Disease of thyroid gland     Grand mal seizure (CMS/HCC)        Surgical History:  Past Surgical History:   Procedure Laterality Date    CARPAL TUNNEL RELEASE      FOOT SURGERY      HERNIA REPAIR      MOUTH SURGERY      OOPHORECTOMY      SINUS SURGERY      WISDOM TOOTH EXTRACTION         Social History:  Social History     Social History Narrative    Raf petit       Family History:  Family History   Problem Relation Age of Onset    Stroke Biological Brother     Heart disease Maternal Grandmother     Kidney disease Maternal Grandmother     Prostate cancer Paternal Grandmother        Allergies:  Codeine, Cortisone, and Penicillins    Current Medications:  Current Outpatient Medications   Medication Sig Dispense Refill    atorvastatin (LIPITOR) 20 mg tablet Take 1 tablet (20 mg total) by mouth daily. 90 tablet 3    azelastine 0.15 % (205.5 mcg) nasal spray ADMINISTER 1 SPRAY INTO EACH NOSTRIL 2 TIMES A DAY AS NEEDED FOR RHINITIS. 30 mL 4    blood-glucose transmitter (DEXCOM G6 TRANSMITTER) device USE ONE TRANSMITTER WITH /SENSORS AND REPLACE EVERY 3 MONTHS      buPROPion XL  (WELLBUTRIN XL) 150 mg 24 hr tablet TAKE 1 TABLET BY MOUTH EVERY DAY 30 tablet 6    CLINPRO 5000 1.1 % paste BRUSH TEETH FOR 1 MINUTE TWICE A DAY DO NOT RINSE, EAT OR DRINK FOR 30 MINS AFTER      fluticasone propionate (FLONASE) 50 mcg/actuation nasal spray Administer 1 spray into each nostril daily.      insulin aspart (NovoLOG) 100 unit/mL patient supplied pump Inject under the skin continuously.      insulin glargine U-100 (LANTUS) 100 unit/mL injection INJECT 16 UNITS UNDER THE SKIN DAILY. INDICATIONS: DIABETES      levothyroxine (SYNTHROID) 75 mcg tablet 1 TABLET ON AN EMPTY STOMACH IN THE MORNING  3    loratadine (CLARITIN) 10 mg tablet TAKE 1 TABLET BY MOUTH EVERY DAY 30 tablet 3    LOW-OGESTREL, 28, 0.3-30 mg-mcg per tablet Take 1 tablet by mouth once daily.      montelukast (SINGULAIR) 10 mg tablet TAKE 1 TABLET BY MOUTH EVERY DAY AT NIGHT 30 tablet 5    mupirocin (BACTROBAN) 2 % ointment Apply 1 application topically 2 (two) times a day for 7 days. 22 g 0    NovoLIN N NPH U-100 Insulin 100 unit/mL injection INJECT 40 UNITS SUBCUTANEOUS DAILY AT BEDTIME      PARoxetine 40 mg tablet Take 1 tablet (40 mg total) by mouth once daily. 90 tablet 6    sulfamethoxazole-trimethoprim (BACTRIM DS) 800-160 mg per tablet Take 1 tablet by mouth 2 (two) times a day for 7 days. 14 tablet 0    zolpidem (AMBIEN) 5 mg tablet TAKE 1 TABLET BY MOUTH NIGHTLY AS NEEDED FOR SLEEP 30 tablet 1     No current facility-administered medications for this visit.       Review of Systems:  Review of Systems   Constitutional: Negative for chills, fatigue and fever.   Respiratory: Negative for cough and shortness of breath.    Cardiovascular: Negative for chest pain.   Gastrointestinal: Negative for nausea and vomiting.   Skin: Positive for color change.   Neurological: Negative for dizziness, weakness and headaches.   Hematological: Negative for adenopathy.       Objective     Vital Signs:  Vitals:    11/11/22 1401   BP: 128/70    Pulse: 93   Resp: 18   Temp: 36.3 °C (97.3 °F)   SpO2: 99%       BMI:  Body mass index is 31 kg/m².     Physical Exam  Vitals reviewed.   Constitutional:       Appearance: Normal appearance.   HENT:      Head: Normocephalic and atraumatic.   Cardiovascular:      Heart sounds: Normal heart sounds.   Pulmonary:      Effort: Pulmonary effort is normal.      Breath sounds: Normal breath sounds.   Skin:     Findings: Bruising and erythema (erythematous, indurated area R lower abdomen measuring approx 3 cm x 2.5 cm R lower abd, +tenderness) present.   Neurological:      Mental Status: She is alert and oriented to person, place, and time.         Recent labs before today:     Lab Results   Component Value Date    WBC 5.6 10/06/2017    HGB 14.3 10/06/2017    HCT 41.9 10/06/2017     (H) 10/06/2017    CHOL 125 03/23/2021    TRIG 68 03/23/2021    HDL 43 03/23/2021    ALT 7 07/08/2022    AST 11 07/08/2022     07/08/2022    K 4.2 07/08/2022     (H) 07/08/2022    CREATININE 1.01 (H) 07/08/2022    BUN 14 07/08/2022    CO2 21 07/08/2022    TSH 1.810 07/08/2022    HGBA1C 8.5 (H) 07/08/2022    MICROALBUR 3.6 07/08/2022        Procedures       Problem List Items Addressed This Visit        Digestive    Cellulitis of abdominal wall - Primary     Start oral antibiotics, warm compresses.  Strict ER precautions reviewed- pt advised to go to ER if any increased redness, swelling, fever, or chills. She verbalizes understanding.

## 2022-11-15 ENCOUNTER — HOSPITAL ENCOUNTER (OUTPATIENT)
Dept: CARDIOLOGY | Facility: CLINIC | Age: 52
Discharge: HOME | End: 2022-11-15
Attending: INTERNAL MEDICINE
Payer: COMMERCIAL

## 2022-11-15 VITALS
BODY MASS INDEX: 30.65 KG/M2 | WEIGHT: 173 LBS | SYSTOLIC BLOOD PRESSURE: 138 MMHG | HEIGHT: 63 IN | DIASTOLIC BLOOD PRESSURE: 75 MMHG

## 2022-11-15 DIAGNOSIS — I21.4 NSTEMI (NON-ST ELEVATED MYOCARDIAL INFARCTION) (CMS/HCC): ICD-10-CM

## 2022-11-15 LAB
AORTIC ROOT ANNULUS: 2.6 CM
ASCENDING AORTA: 2.6 CM
AV PEAK GRADIENT: 8 MMHG
AV PEAK VELOCITY-S: 1.42 M/S
AV VALVE AREA: 1.38 CM2
BSA FOR ECHO PROCEDURE: 1.87 M2
CUSP SEPARATION: 1.5 CM
E WAVE DECELERATION TIME: 175 MS
E/A RATIO: 0.8
E/E' RATIO: 13.3
E/LAT E' RATIO: 6.6
EDV (BP): 54.3 CM3
EF (A4C): 67.4 %
EF A2C: 65.7 %
EJECTION FRACTION: 66.3 %
ESV (BP): 18.3 CM3
FRACTIONAL SHORTENING: 23.5 %
INTERVENTRICULAR SEPTUM: 0.68 CM
LA ESV (BP): 28.6 CM3
LA ESV INDEX (A2C): 14.01 CM3/M2
LA ESV INDEX (BP): 15.29 CM3/M2
LA/AORTA RATIO: 1.23
LAAS-AP2: 11.9 CM2
LAAS-AP4: 12.6 CM2
LAD 2D: 3.2 CM
LALD A4C: 4.21 CM
LALD A4C: 4.42 CM
LAV-S: 26.2 CM3
LEFT ATRIUM VOLUME INDEX: 15.88 CM3/M2
LEFT ATRIUM VOLUME: 29.7 CM3
LEFT INTERNAL DIMENSION IN SYSTOLE: 3.1 CM (ref 2.53–3.83)
LEFT VENTRICLE DIASTOLIC VOLUME INDEX: 31.66 CM3/M2
LEFT VENTRICLE DIASTOLIC VOLUME: 59.2 CM3
LEFT VENTRICLE SYSTOLIC VOLUME INDEX: 10.32 CM3/M2
LEFT VENTRICLE SYSTOLIC VOLUME: 19.3 CM3
LEFT VENTRICULAR INTERNAL DIMENSION IN DIASTOLE: 4.05 CM (ref 4.28–5.94)
LEFT VENTRICULAR POSTERIOR WALL IN END DIASTOLE: 0.89 CM (ref 0.56–1.04)
LV DIASTOLIC VOLUME: 44.6 CM3
LV ESV (APICAL 2 CHAMBER): 15.3 CM3
LVAD-AP2: 19 CM2
LVAD-AP4: 23 CM2
LVAS-AP2: 10.7 CM2
LVAS-AP4: 11.3 CM2
LVEDVI(A2C): 23.85 CM3/M2
LVEDVI(BP): 29.04 CM3/M2
LVESVI(A2C): 8.18 CM3/M2
LVESVI(BP): 9.79 CM3/M2
LVLD-AP2: 6.66 CM
LVLD-AP4: 7.44 CM
LVLS-AP2: 6.51 CM
LVLS-AP4: 5.74 CM
LVOT 2D: 1.8 CM
LVOT A: 2.54 CM2
LVOT PEAK VELOCITY: 0.77 M/S
MV E'TISSUE VEL-LAT: 0.13 M/S
MV E'TISSUE VEL-MED: 0.06 M/S
MV PEAK A VEL: 1 M/S
MV PEAK E VEL: 0.84 M/S
MV VALVE AREA P 1/2 METHOD: 4.31 CM2
POSTERIOR WALL: 0.89 CM
RVOT VMAX: 0.77 M/S
SEPTAL TISSUE DOPPLER FREE WALL LATE DIA VELOCITY (APICAL 4 CHAMBER VIEW): 0.11 M/S
Z-SCORE OF LEFT VENTRICULAR DIMENSION IN END DIASTOLE: -2.18
Z-SCORE OF LEFT VENTRICULAR DIMENSION IN END SYSTOLE: -0.03
Z-SCORE OF LEFT VENTRICULAR POSTERIOR WALL IN END DIASTOLE: 0.79

## 2022-11-15 PROCEDURE — 93306 TTE W/DOPPLER COMPLETE: CPT | Performed by: INTERNAL MEDICINE

## 2022-11-16 RX ORDER — MONTELUKAST SODIUM 10 MG/1
TABLET ORAL
Qty: 30 TABLET | Refills: 5 | Status: SHIPPED | OUTPATIENT
Start: 2022-11-16 | End: 2023-05-17

## 2022-11-17 ENCOUNTER — TELEPHONE (OUTPATIENT)
Dept: CARDIOLOGY | Facility: CLINIC | Age: 52
End: 2022-11-17
Payer: COMMERCIAL

## 2022-11-18 ENCOUNTER — HOSPITAL ENCOUNTER (OUTPATIENT)
Dept: CARDIOLOGY | Facility: HOSPITAL | Age: 52
Discharge: HOME | End: 2022-11-18
Attending: INTERNAL MEDICINE
Payer: COMMERCIAL

## 2022-11-18 DIAGNOSIS — E78.00 PURE HYPERCHOLESTEROLEMIA: ICD-10-CM

## 2022-11-18 DIAGNOSIS — I21.4 NSTEMI (NON-ST ELEVATED MYOCARDIAL INFARCTION) (CMS/HCC): ICD-10-CM

## 2022-11-18 DIAGNOSIS — Z82.3 FAMILY HISTORY OF STROKE: ICD-10-CM

## 2022-11-18 DIAGNOSIS — E10.65 TYPE 1 DIABETES MELLITUS WITH HYPERGLYCEMIA (CMS/HCC): ICD-10-CM

## 2022-11-18 LAB
LEFT CCA DIST DIAS: 16.03 CM/S
LEFT CCA DIST SYS: 97.35 CM/S
LEFT CCA MID DIAS: 0 CM/S
LEFT CCA MID SYS: 81.08 CM/S
LEFT CCA PROX DIAS: 16.03 CM/S
LEFT CCA PROX SYS: 125.23 CM/S
LEFT ICA DIST DIAS: 12.84 CM/S
LEFT ICA DIST SYS: 47.57 CM/S
LEFT ICA MID DIAS: 19.57 CM/S
LEFT ICA MID SYS: 76.2 CM/S
LEFT ICA PROX DIAS: 14.38 CM/S
LEFT ICA PROX SYS: 109.67 CM/S
LEFT ICA/CCA SYS: 1.13
LEFT SUBCLAVIAN SYS: 139.2 CM/S
LT ECA PROX EDV: 4.68 CM/S
LT ECA PROX PSV: 87.06 CM/S
LT SUBCLAVIAN PROX PSV: 139.17 CM/S
LT VERTEBRAL PROX EDV: 12.76 CM/S
LT VERTEBRAL PROX PSV: 59.6 CM/S
RIGHT CCA DIST DIAS: 14.38 CM/S
RIGHT CCA DIST SYS: 91.91 CM/S
RIGHT CCA MID DIAS: 11.15 CM/S
RIGHT CCA MID SYS: 96.75 CM/S
RIGHT CCA PROX DIAS: 12.91 CM/S
RIGHT CCA PROX SYS: 88.56 CM/S
RIGHT ECA SYS: 111.29 CM/S
RIGHT ICA DIST DIAS: 23 CM/S
RIGHT ICA DIST SYS: 106.64 CM/S
RIGHT ICA MID DIAS: 17.61 CM/S
RIGHT ICA MID SYS: 75.75 CM/S
RIGHT ICA PROX DIAS: 14.38 CM/S
RIGHT ICA PROX SYS: 106.44 CM/S
RIGHT ICA/CCA SYS: 1.16
RIGHT SUBCLAVIAN SYS: 139.2 CM/S
RIGHT VERTEBRAL SYS: 81.08 CM/S
RT ECA PROC EDV: 7.91 CM/S
RT SUBCLAVIAN PROX PSV: 139.17 CM/S
RT VERTEBRAL PROX EDV: 20.68 CM/S

## 2022-11-18 PROCEDURE — 93880 EXTRACRANIAL BILAT STUDY: CPT

## 2022-11-21 ENCOUNTER — TELEPHONE (OUTPATIENT)
Dept: CARDIOLOGY | Facility: CLINIC | Age: 52
End: 2022-11-21
Payer: COMMERCIAL

## 2022-11-22 ENCOUNTER — OFFICE VISIT (OUTPATIENT)
Dept: PRIMARY CARE | Facility: CLINIC | Age: 52
End: 2022-11-22
Payer: COMMERCIAL

## 2022-11-22 VITALS
TEMPERATURE: 97.9 F | OXYGEN SATURATION: 98 % | HEIGHT: 63 IN | BODY MASS INDEX: 30.65 KG/M2 | DIASTOLIC BLOOD PRESSURE: 60 MMHG | HEART RATE: 91 BPM | WEIGHT: 173 LBS | SYSTOLIC BLOOD PRESSURE: 110 MMHG | RESPIRATION RATE: 16 BRPM

## 2022-11-22 DIAGNOSIS — E03.9 ACQUIRED HYPOTHYROIDISM: ICD-10-CM

## 2022-11-22 DIAGNOSIS — E78.00 PURE HYPERCHOLESTEROLEMIA: ICD-10-CM

## 2022-11-22 DIAGNOSIS — E10.65 TYPE 1 DIABETES MELLITUS WITH HYPERGLYCEMIA (CMS/HCC): Primary | ICD-10-CM

## 2022-11-22 DIAGNOSIS — E10.3553 STABLE PROLIFERATIVE DIABETIC RETINOPATHY OF BOTH EYES ASSOCIATED WITH TYPE 1 DIABETES MELLITUS (CMS/HCC): ICD-10-CM

## 2022-11-22 DIAGNOSIS — F41.9 ANXIETY: ICD-10-CM

## 2022-11-22 DIAGNOSIS — I82.621 ACUTE DEEP VEIN THROMBOSIS (DVT) OF BRACHIAL VEIN OF RIGHT UPPER EXTREMITY (CMS/HCC): ICD-10-CM

## 2022-11-22 DIAGNOSIS — I21.4 NSTEMI (NON-ST ELEVATED MYOCARDIAL INFARCTION) (CMS/HCC): ICD-10-CM

## 2022-11-22 PROBLEM — R55 SYNCOPE: Status: RESOLVED | Noted: 2022-08-06 | Resolved: 2022-11-22

## 2022-11-22 PROBLEM — R30.0 DYSURIA: Status: RESOLVED | Noted: 2019-07-19 | Resolved: 2022-11-22

## 2022-11-22 PROBLEM — E10.10 DIABETIC KETOACIDOSIS WITHOUT COMA ASSOCIATED WITH TYPE 1 DIABETES MELLITUS (CMS/HCC): Status: RESOLVED | Noted: 2022-08-09 | Resolved: 2022-11-22

## 2022-11-22 PROBLEM — L02.211 ABDOMINAL WALL ABSCESS: Status: RESOLVED | Noted: 2018-09-04 | Resolved: 2022-11-22

## 2022-11-22 PROBLEM — H66.91 RIGHT ACUTE OTITIS MEDIA: Status: RESOLVED | Noted: 2022-07-26 | Resolved: 2022-11-22

## 2022-11-22 PROBLEM — L03.311 CELLULITIS OF ABDOMINAL WALL: Status: RESOLVED | Noted: 2022-11-11 | Resolved: 2022-11-22

## 2022-11-22 PROCEDURE — 3008F BODY MASS INDEX DOCD: CPT | Performed by: FAMILY MEDICINE

## 2022-11-22 PROCEDURE — 99214 OFFICE O/P EST MOD 30 MIN: CPT | Performed by: FAMILY MEDICINE

## 2022-11-22 NOTE — PROGRESS NOTES
Subjective      Patient ID: Annette Montes De Oca is a 52 y.o. female.      HPI    The following have been reviewed and updated as appropriate in this visit:   Allergies  Meds  Problems          Pt with hx of Type 1 dm hypothyroidism obesity diabetic retinopathy anxiety;  recent upper ext DVT and [id number] presents for follow up     Cataract surgery was postponed -  Will do in February    Went back to work end of September      Dr. Vega endo  12/2  Labs  Scheduled     Doing insulin injections and fingerstick readings      Dr Craft All testing for hypercoagulable state was normal.  No evidence of underlying hypercoagulable condition.  Patient should complete 3 months of anticoagulation for her provoked upper extremity DVT.  She will finish this in early November.    Dr Johnson cardio getting testing done    Did have covid booster thi fall and flu vaccine         Lab Results   Component Value Date    WBC 5.6 10/06/2017    HGB 14.3 10/06/2017    HCT 41.9 10/06/2017     (H) 10/06/2017    CHOL 125 03/23/2021    TRIG 68 03/23/2021    HDL 43 03/23/2021    ALT 7 07/08/2022    AST 11 07/08/2022     07/08/2022    K 4.2 07/08/2022     (H) 07/08/2022    CREATININE 1.01 (H) 07/08/2022    BUN 14 07/08/2022    CO2 21 07/08/2022    TSH 1.810 07/08/2022    HGBA1C 8.5 (H) 07/08/2022    MICROALBUR 3.6 07/08/2022    LDLCALC 68 03/23/2021     Review of Systems      Current Outpatient Medications:     atorvastatin (LIPITOR) 20 mg tablet, Take 1 tablet (20 mg total) by mouth daily., Disp: 90 tablet, Rfl: 3    azelastine 0.15 % (205.5 mcg) nasal spray, ADMINISTER 1 SPRAY INTO EACH NOSTRIL 2 TIMES A DAY AS NEEDED FOR RHINITIS., Disp: 30 mL, Rfl: 4    blood-glucose transmitter (DEXCOM G6 TRANSMITTER) device, USE ONE TRANSMITTER WITH /SENSORS AND REPLACE EVERY 3 MONTHS, Disp: , Rfl:     buPROPion XL (WELLBUTRIN XL) 150 mg 24 hr tablet, TAKE 1 TABLET BY MOUTH EVERY DAY, Disp: 30 tablet, Rfl: 6    CLINPRO  "5000 1.1 % paste, BRUSH TEETH FOR 1 MINUTE TWICE A DAY DO NOT RINSE, EAT OR DRINK FOR 30 MINS AFTER, Disp: , Rfl:     fluticasone propionate (FLONASE) 50 mcg/actuation nasal spray, Administer 1 spray into each nostril daily., Disp: , Rfl:     insulin aspart (NovoLOG) 100 unit/mL patient supplied pump, Inject under the skin continuously., Disp: , Rfl:     insulin glargine U-100 (LANTUS) 100 unit/mL injection, INJECT 16 UNITS UNDER THE SKIN DAILY. INDICATIONS: DIABETES, Disp: , Rfl:     levothyroxine (SYNTHROID) 75 mcg tablet, 1 TABLET ON AN EMPTY STOMACH IN THE MORNING, Disp: , Rfl: 3    loratadine (CLARITIN) 10 mg tablet, TAKE 1 TABLET BY MOUTH EVERY DAY, Disp: 30 tablet, Rfl: 3    LOW-OGESTREL, 28, 0.3-30 mg-mcg per tablet, Take 1 tablet by mouth once daily., Disp: , Rfl:     montelukast (SINGULAIR) 10 mg tablet, TAKE 1 TABLET BY MOUTH EVERY DAY AT NIGHT, Disp: 30 tablet, Rfl: 5    NovoLIN N NPH U-100 Insulin 100 unit/mL injection, INJECT 40 UNITS SUBCUTANEOUS DAILY AT BEDTIME, Disp: , Rfl:     PARoxetine 40 mg tablet, Take 1 tablet (40 mg total) by mouth once daily., Disp: 90 tablet, Rfl: 6    zolpidem (AMBIEN) 5 mg tablet, TAKE 1 TABLET BY MOUTH NIGHTLY AS NEEDED FOR SLEEP, Disp: 30 tablet, Rfl: 1  Codeine, Cortisone, and Penicillins  Past Medical History:   Diagnosis Date    Diabetes mellitus type I (CMS/HCC)     Disease of thyroid gland     Grand mal seizure (CMS/HCC)      Past Surgical History:   Procedure Laterality Date    CARPAL TUNNEL RELEASE      FOOT SURGERY      HERNIA REPAIR      MOUTH SURGERY      OOPHORECTOMY      SINUS SURGERY      WISDOM TOOTH EXTRACTION       Objective     Vitals:    11/22/22 1307   BP: 110/60   BP Location: Left upper arm   Patient Position: Sitting   Pulse: 91   Resp: 16   Temp: 36.6 °C (97.9 °F)   TempSrc: Temporal   SpO2: 98%   Weight: 78.5 kg (173 lb)   Height: 1.6 m (5' 3\")       Body mass index is 30.65 kg/m².    Physical Exam  Constitutional:       " Appearance: She is well-developed and well-nourished. She is obese.   HENT:      Head: Normocephalic and atraumatic.   Neck:      Thyroid: No thyromegaly.   Cardiovascular:      Rate and Rhythm: Normal rate and regular rhythm.      Heart sounds: Normal heart sounds. No murmur heard.    No friction rub. No gallop.   Pulmonary:      Effort: Pulmonary effort is normal. No respiratory distress.      Breath sounds: Normal breath sounds. No wheezing or rales.   Musculoskeletal:         General: No edema.      Cervical back: Normal range of motion and neck supple.      Right lower leg: No edema.      Left lower leg: No edema.   Lymphadenopathy:      Cervical: No cervical adenopathy.   Neurological:      Mental Status: She is alert and oriented to person, place, and time.   Psychiatric:         Mood and Affect: Mood and affect normal.         Behavior: Behavior normal.         Thought Content: Thought content normal.         Judgment: Judgment normal.         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

## 2022-11-23 ENCOUNTER — TELEPHONE (OUTPATIENT)
Dept: RADIOLOGY | Facility: HOSPITAL | Age: 52
End: 2022-11-23
Payer: COMMERCIAL

## 2022-11-26 LAB
ALBUMIN SERPL-MCNC: 3.8 G/DL (ref 3.8–4.9)
ALP SERPL-CCNC: 135 IU/L (ref 44–121)
ALT SERPL-CCNC: 16 IU/L (ref 0–32)
AST SERPL-CCNC: 21 IU/L (ref 0–40)
BILIRUB DIRECT SERPL-MCNC: <0.1 MG/DL (ref 0–0.4)
BILIRUB SERPL-MCNC: <0.2 MG/DL (ref 0–1.2)
BUN SERPL-MCNC: 10 MG/DL (ref 6–24)
CHOLEST SERPL-MCNC: 137 MG/DL (ref 100–199)
CREAT SERPL-MCNC: 1.06 MG/DL (ref 0.57–1)
EGFRCR SERPLBLD CKD-EPI 2021: 63 ML/MIN/1.73
HDLC SERPL-MCNC: 54 MG/DL
LDLC SERPL CALC-MCNC: 66 MG/DL (ref 0–99)
PROT SERPL-MCNC: 6 G/DL (ref 6–8.5)
TRIGL SERPL-MCNC: 91 MG/DL (ref 0–149)
VLDLC SERPL CALC-MCNC: 17 MG/DL (ref 5–40)

## 2022-11-29 ENCOUNTER — HOSPITAL ENCOUNTER (OUTPATIENT)
Dept: RADIOLOGY | Facility: HOSPITAL | Age: 52
Discharge: HOME | End: 2022-11-29
Attending: INTERNAL MEDICINE
Payer: COMMERCIAL

## 2022-11-29 VITALS
WEIGHT: 170 LBS | SYSTOLIC BLOOD PRESSURE: 167 MMHG | RESPIRATION RATE: 18 BRPM | HEIGHT: 63 IN | HEART RATE: 70 BPM | BODY MASS INDEX: 30.12 KG/M2 | OXYGEN SATURATION: 100 % | DIASTOLIC BLOOD PRESSURE: 65 MMHG

## 2022-11-29 DIAGNOSIS — E10.65 TYPE 1 DIABETES MELLITUS WITH HYPERGLYCEMIA (CMS/HCC): ICD-10-CM

## 2022-11-29 DIAGNOSIS — Z82.3 FAMILY HISTORY OF STROKE: ICD-10-CM

## 2022-11-29 DIAGNOSIS — I21.4 NSTEMI (NON-ST ELEVATED MYOCARDIAL INFARCTION) (CMS/HCC): ICD-10-CM

## 2022-11-29 PROCEDURE — 63700000 HC SELF-ADMINISTRABLE DRUG

## 2022-11-29 PROCEDURE — 75574 CT ANGIO HRT W/3D IMAGE: CPT | Mod: ME

## 2022-11-29 PROCEDURE — 63600105 HC IODINE BASED CONTRAST

## 2022-11-29 RX ORDER — METOPROLOL TARTRATE 50 MG/1
100 TABLET ORAL ONCE
Status: COMPLETED | OUTPATIENT
Start: 2022-11-29 | End: 2022-11-29

## 2022-11-29 RX ORDER — METOPROLOL TARTRATE 1 MG/ML
5 INJECTION, SOLUTION INTRAVENOUS AS NEEDED
Status: DISCONTINUED | OUTPATIENT
Start: 2022-11-29 | End: 2022-11-30 | Stop reason: HOSPADM

## 2022-11-29 RX ORDER — METOPROLOL TARTRATE 50 MG/1
50 TABLET ORAL EVERY 30 MIN PRN
Status: DISCONTINUED | OUTPATIENT
Start: 2022-11-29 | End: 2022-11-30 | Stop reason: HOSPADM

## 2022-11-29 RX ORDER — NITROGLYCERIN 0.4 MG/1
0.4 TABLET SUBLINGUAL ONCE
Status: COMPLETED | OUTPATIENT
Start: 2022-11-29 | End: 2022-11-29

## 2022-11-29 RX ORDER — IVABRADINE 7.5 MG/1
15 TABLET, FILM COATED ORAL ONCE
Status: COMPLETED | OUTPATIENT
Start: 2022-11-29 | End: 2022-11-29

## 2022-11-29 RX ADMIN — IOHEXOL 100 ML: 350 INJECTION, SOLUTION INTRAVENOUS at 12:04

## 2022-11-29 RX ADMIN — METOPROLOL TARTRATE 50 MG: 50 TABLET, FILM COATED ORAL at 10:57

## 2022-11-29 RX ADMIN — METOPROLOL TARTRATE 100 MG: 50 TABLET, FILM COATED ORAL at 10:29

## 2022-11-29 RX ADMIN — IVABRADINE 15 MG: 7.5 TABLET, FILM COATED ORAL at 10:25

## 2022-11-29 RX ADMIN — NITROGLYCERIN 0.4 MG: 0.4 TABLET SUBLINGUAL at 11:46

## 2022-12-01 ENCOUNTER — TELEPHONE (OUTPATIENT)
Dept: CARDIOLOGY | Facility: CLINIC | Age: 52
End: 2022-12-01
Payer: COMMERCIAL

## 2022-12-01 NOTE — TELEPHONE ENCOUNTER
On December 2, 2022 I called Annette to discuss her CAT scan.  I left a message for her to return my call

## 2022-12-02 NOTE — TELEPHONE ENCOUNTER
On December 2, 2022 I spoke with Annette about her coronary artery CTA.  She does have mild nonobstructive coronary disease at this point time.  Her last LDL cholesterol done at the end of November was excellent at 67.  She will continue on her atorvastatin and also start a baby aspirin.  I will see her again in a year and do a stress echo prior to that visit unless she should develop symptoms.  Certainly if she does I will be happy to see her at any time

## 2022-12-05 DIAGNOSIS — R06.09 DYSPNEA ON EXERTION: ICD-10-CM

## 2022-12-05 DIAGNOSIS — R07.2 PRECORDIAL PAIN: ICD-10-CM

## 2022-12-05 DIAGNOSIS — E78.00 PURE HYPERCHOLESTEROLEMIA: ICD-10-CM

## 2022-12-05 DIAGNOSIS — R07.89 OTHER CHEST PAIN: ICD-10-CM

## 2022-12-05 DIAGNOSIS — I21.4 NSTEMI (NON-ST ELEVATED MYOCARDIAL INFARCTION) (CMS/HCC): Primary | ICD-10-CM

## 2022-12-05 DIAGNOSIS — E10.65 TYPE 1 DIABETES MELLITUS WITH HYPERGLYCEMIA (CMS/HCC): ICD-10-CM

## 2022-12-05 DIAGNOSIS — R93.1 ELEVATED CORONARY ARTERY CALCIUM SCORE: ICD-10-CM

## 2022-12-12 ENCOUNTER — TELEPHONE (OUTPATIENT)
Dept: PRIMARY CARE | Facility: CLINIC | Age: 52
End: 2022-12-12
Payer: COMMERCIAL

## 2022-12-12 RX ORDER — PAROXETINE HYDROCHLORIDE 40 MG/1
TABLET, FILM COATED ORAL
Qty: 90 TABLET | Refills: 0 | Status: SHIPPED | OUTPATIENT
Start: 2022-12-12 | End: 2023-03-09

## 2022-12-15 DIAGNOSIS — J30.89 OTHER ALLERGIC RHINITIS: ICD-10-CM

## 2022-12-15 RX ORDER — LORATADINE 10 MG/1
TABLET ORAL
Qty: 90 TABLET | Refills: 0 | Status: SHIPPED | OUTPATIENT
Start: 2022-12-15 | End: 2023-02-10

## 2023-02-09 ENCOUNTER — OFFICE VISIT (OUTPATIENT)
Dept: PRIMARY CARE | Facility: CLINIC | Age: 53
End: 2023-02-09
Payer: COMMERCIAL

## 2023-02-09 VITALS
HEIGHT: 63 IN | HEART RATE: 68 BPM | BODY MASS INDEX: 31.01 KG/M2 | DIASTOLIC BLOOD PRESSURE: 60 MMHG | OXYGEN SATURATION: 98 % | WEIGHT: 175 LBS | TEMPERATURE: 98 F | RESPIRATION RATE: 16 BRPM | SYSTOLIC BLOOD PRESSURE: 140 MMHG

## 2023-02-09 DIAGNOSIS — H66.92 LEFT ACUTE OTITIS MEDIA: Primary | ICD-10-CM

## 2023-02-09 PROCEDURE — 99213 OFFICE O/P EST LOW 20 MIN: CPT | Performed by: NURSE PRACTITIONER

## 2023-02-09 PROCEDURE — 3008F BODY MASS INDEX DOCD: CPT | Performed by: NURSE PRACTITIONER

## 2023-02-09 RX ORDER — INSULIN GLARGINE-YFGN 100 [IU]/ML
INJECTION, SOLUTION SUBCUTANEOUS
COMMUNITY
Start: 2022-08-08 | End: 2023-02-09

## 2023-02-09 RX ORDER — AZITHROMYCIN 250 MG/1
TABLET, FILM COATED ORAL
Qty: 6 TABLET | Refills: 0 | Status: SHIPPED | OUTPATIENT
Start: 2023-02-09 | End: 2023-02-14

## 2023-02-09 RX ORDER — ZOLPIDEM TARTRATE 5 MG/1
TABLET ORAL
Qty: 30 TABLET | Refills: 1 | Status: SHIPPED | OUTPATIENT
Start: 2023-02-09 | End: 2023-04-26 | Stop reason: SDUPTHER

## 2023-02-09 ASSESSMENT — ENCOUNTER SYMPTOMS
SHORTNESS OF BREATH: 0
SORE THROAT: 0
DIARRHEA: 0
COUGH: 0
WEAKNESS: 0
HEADACHES: 0
SINUS PAIN: 1
SINUS PRESSURE: 1
CHILLS: 0
ADENOPATHY: 0
NAUSEA: 1
FATIGUE: 1
VOMITING: 0
FEVER: 0

## 2023-02-09 NOTE — ASSESSMENT & PLAN NOTE
Start antibiotic.  Continue to closely monitor blood glucose.  Warning signs and follow up precautions reviewed, pt verbalizes understanding.  ENT evaluation advised for recurrent sinus and ear infections, contact info provided.

## 2023-02-09 NOTE — PROGRESS NOTES
[address], [address]  [place] [address]  Phone [phone] Fax [phone]     Reason for visit:   Chief Complaint   Patient presents with   • Hyperglycemia     Possible ear infection       HPI   Annette Montes De Oca is a 52 y.o. female who presents with left ear pain and pressure, nasal congestion, sinus pressure for the last 4 days.  Blood glucoses elevated 300-400.   She has a hx of chronic sinusitis. Previously had sinus surgery.  Plans to follow up with ENT          Medical History:  Past Medical History:   Diagnosis Date   • Allergic    • Coronary artery disease    • Diabetes mellitus type I (CMS/HCC)    • Disease of thyroid gland    • Grand mal seizure (CMS/HCC)        Surgical History:  Past Surgical History:   Procedure Laterality Date   • CARPAL TUNNEL RELEASE     • FOOT SURGERY     • HERNIA REPAIR     • MOUTH SURGERY     • OOPHORECTOMY     • SINUS SURGERY     • WISDOM TOOTH EXTRACTION         Social History:  Social History     Social History Narrative    Raf petit       Family History:  Family History   Problem Relation Age of Onset   • Stroke Biological Brother    • Heart disease Maternal Grandmother    • Kidney disease Maternal Grandmother    • Prostate cancer Paternal Grandmother        Allergies:  Codeine, Cortisone, and Penicillins    Current Medications:  Current Outpatient Medications   Medication Sig Dispense Refill   • atorvastatin (LIPITOR) 20 mg tablet Take 1 tablet (20 mg total) by mouth daily. 90 tablet 3   • azelastine 0.15 % (205.5 mcg) nasal spray ADMINISTER 1 SPRAY INTO EACH NOSTRIL 2 TIMES A DAY AS NEEDED FOR RHINITIS. 30 mL 4   • azithromycin (ZITHROMAX) 250 mg tablet Take 2 tablets the first day, then 1 tablet daily for 4 days. 6 tablet 0   • blood-glucose transmitter (DEXCOM G6 TRANSMITTER) device USE ONE TRANSMITTER WITH /SENSORS AND REPLACE EVERY 3 MONTHS     • buPROPion XL (WELLBUTRIN XL) 150 mg 24 hr tablet TAKE 1 TABLET BY  MOUTH EVERY DAY 30 tablet 6   • CLINPRO 5000 1.1 % paste BRUSH TEETH FOR 1 MINUTE TWICE A DAY DO NOT RINSE, EAT OR DRINK FOR 30 MINS AFTER     • fluticasone propionate (FLONASE) 50 mcg/actuation nasal spray Administer 1 spray into each nostril daily.     • insulin aspart (NovoLOG) 100 unit/mL patient supplied pump Inject under the skin continuously.     • insulin glargine U-100 (LANTUS) 100 unit/mL injection INJECT 16 UNITS UNDER THE SKIN DAILY. INDICATIONS: DIABETES     • levothyroxine (SYNTHROID) 75 mcg tablet 1 TABLET ON AN EMPTY STOMACH IN THE MORNING  3   • loratadine (CLARITIN) 10 mg tablet TAKE 1 TABLET BY MOUTH EVERY DAY 90 tablet 0   • LOW-OGESTREL, 28, 0.3-30 mg-mcg per tablet Take 1 tablet by mouth once daily.     • montelukast (SINGULAIR) 10 mg tablet TAKE 1 TABLET BY MOUTH EVERY DAY AT NIGHT 30 tablet 5   • NovoLIN N NPH U-100 Insulin 100 unit/mL injection INJECT 40 UNITS SUBCUTANEOUS DAILY AT BEDTIME     • PARoxetine (PAXIL) 40 mg tablet TAKE 1 TABLET BY MOUTH EVERY DAY 90 tablet 0   • zolpidem (AMBIEN) 5 mg tablet TAKE 1 TABLET BY MOUTH NIGHTLY AS NEEDED FOR SLEEP 30 tablet 1   • apixaban (ELIQUIS) 5 mg tablet TAKE 2 TABLETS BY MOUTH TWICE DAILY FOR 7 DAYS THEN TAKE 1 TABLET BY MOUTH TWICE DAILY FOR 23 DAYS FOR ANTICOAGULANT THERAPY       No current facility-administered medications for this visit.       Review of Systems:  Review of Systems   Constitutional: Positive for fatigue. Negative for chills and fever.   HENT: Positive for congestion, ear pain (left), sinus pressure and sinus pain. Negative for sore throat.    Respiratory: Negative for cough and shortness of breath.    Cardiovascular: Negative for chest pain.   Gastrointestinal: Positive for nausea. Negative for diarrhea and vomiting.   Neurological: Negative for weakness and headaches.   Hematological: Negative for adenopathy.       Objective     Vital Signs:  Vitals:    02/09/23 1005   BP: 140/60   Pulse: 68   Resp: 16   Temp: 36.7 °C (98  °F)   SpO2: 98%       BMI:  Body mass index is 31 kg/m².     Physical Exam  Vitals reviewed.   Constitutional:       Appearance: Normal appearance.   HENT:      Head: Normocephalic and atraumatic.      Right Ear: Tympanic membrane normal.      Left Ear: Tympanic membrane is erythematous and bulging.      Nose: Congestion (erythema and inflammation of nasal turbinates bilaterally) present.      Mouth/Throat:      Mouth: Mucous membranes are moist.      Pharynx: Oropharynx is clear.   Cardiovascular:      Heart sounds: Normal heart sounds.   Pulmonary:      Effort: Pulmonary effort is normal.      Breath sounds: Normal breath sounds.   Lymphadenopathy:      Cervical: No cervical adenopathy.   Neurological:      Mental Status: She is alert and oriented to person, place, and time.   Psychiatric:         Mood and Affect: Mood normal.         Behavior: Behavior normal.         Recent labs before today:     Lab Results   Component Value Date    WBC 5.6 10/06/2017    HGB 14.3 10/06/2017    HCT 41.9 10/06/2017     (H) 10/06/2017    CHOL 137 11/25/2022    TRIG 91 11/25/2022    HDL 54 11/25/2022    ALT 16 11/25/2022    AST 21 11/25/2022     07/08/2022    K 4.2 07/08/2022     (H) 07/08/2022    CREATININE 1.06 (H) 11/25/2022    BUN 10 11/25/2022    CO2 21 07/08/2022    TSH 1.810 07/08/2022    HGBA1C 8.5 (H) 07/08/2022    MICROALBUR 3.6 07/08/2022        Procedures   Assessment     There are no Patient Instructions on file for this visit.    Problem List Items Addressed This Visit        Infectious/Inflammatory    Left acute otitis media - Primary     Start antibiotic.  Continue to closely monitor blood glucose.  Warning signs and follow up precautions reviewed, pt verbalizes understanding.  ENT evaluation advised for recurrent sinus and ear infections, contact info provided.                      CLINTON Caraballo  2/9/2023

## 2023-02-10 DIAGNOSIS — J30.89 OTHER ALLERGIC RHINITIS: ICD-10-CM

## 2023-02-10 RX ORDER — LORATADINE 10 MG/1
TABLET ORAL
Qty: 90 TABLET | Refills: 0 | Status: SHIPPED | OUTPATIENT
Start: 2023-02-10 | End: 2023-05-17

## 2023-03-09 RX ORDER — PAROXETINE HYDROCHLORIDE 40 MG/1
TABLET, FILM COATED ORAL
Qty: 90 TABLET | Refills: 1 | Status: SHIPPED | OUTPATIENT
Start: 2023-03-09 | End: 2023-08-01

## 2023-03-17 LAB — MLHC DIABETIC EYE EXAM (EXTERNAL): NORMAL

## 2023-03-18 LAB
ALBUMIN SERPL-MCNC: 3.6 G/DL (ref 3.8–4.9)
ALBUMIN/GLOB SERPL: 1.4 {RATIO} (ref 1.2–2.2)
ALP SERPL-CCNC: 109 IU/L (ref 44–121)
ALT SERPL-CCNC: 11 IU/L (ref 0–32)
AST SERPL-CCNC: 14 IU/L (ref 0–40)
BILIRUB SERPL-MCNC: 0.2 MG/DL (ref 0–1.2)
BUN SERPL-MCNC: 12 MG/DL (ref 6–24)
BUN/CREAT SERPL: 11 (ref 9–23)
CALCIUM SERPL-MCNC: 9.3 MG/DL (ref 8.7–10.2)
CHLORIDE SERPL-SCNC: 106 MMOL/L (ref 96–106)
CHOLEST SERPL-MCNC: 138 MG/DL (ref 100–199)
CO2 SERPL-SCNC: 19 MMOL/L (ref 20–29)
CREAT SERPL-MCNC: 1.14 MG/DL (ref 0.57–1)
EGFRCR SERPLBLD CKD-EPI 2021: 57 ML/MIN/1.73
GLOBULIN SER CALC-MCNC: 2.6 G/DL (ref 1.5–4.5)
GLUCOSE SERPL-MCNC: 240 MG/DL (ref 70–99)
HBA1C MFR BLD: 9.3 % (ref 4.8–5.6)
HDLC SERPL-MCNC: 55 MG/DL
LDLC SERPL CALC-MCNC: 69 MG/DL (ref 0–99)
POTASSIUM SERPL-SCNC: 4.1 MMOL/L (ref 3.5–5.2)
PROT SERPL-MCNC: 6.2 G/DL (ref 6–8.5)
SODIUM SERPL-SCNC: 141 MMOL/L (ref 134–144)
TRIGL SERPL-MCNC: 68 MG/DL (ref 0–149)
VLDLC SERPL CALC-MCNC: 14 MG/DL (ref 5–40)

## 2023-03-20 ENCOUNTER — TELEPHONE (OUTPATIENT)
Dept: PRIMARY CARE | Facility: CLINIC | Age: 53
End: 2023-03-20

## 2023-03-22 RX ORDER — BUPROPION HYDROCHLORIDE 150 MG/1
TABLET ORAL
Qty: 90 TABLET | Refills: 1 | Status: SHIPPED | OUTPATIENT
Start: 2023-03-22 | End: 2023-08-31

## 2023-03-28 ENCOUNTER — VBI (OUTPATIENT)
Dept: ADMINISTRATIVE | Facility: OTHER | Age: 53
End: 2023-03-28

## 2023-04-21 ENCOUNTER — OFFICE VISIT (OUTPATIENT)
Dept: PRIMARY CARE | Facility: CLINIC | Age: 53
End: 2023-04-21
Payer: COMMERCIAL

## 2023-04-21 VITALS
BODY MASS INDEX: 32.43 KG/M2 | OXYGEN SATURATION: 99 % | HEIGHT: 63 IN | WEIGHT: 183 LBS | SYSTOLIC BLOOD PRESSURE: 126 MMHG | TEMPERATURE: 97.9 F | DIASTOLIC BLOOD PRESSURE: 82 MMHG | HEART RATE: 95 BPM | RESPIRATION RATE: 16 BRPM

## 2023-04-21 DIAGNOSIS — E10.65 TYPE 1 DIABETES MELLITUS WITH HYPERGLYCEMIA (CMS/HCC): Primary | ICD-10-CM

## 2023-04-21 DIAGNOSIS — E78.00 PURE HYPERCHOLESTEROLEMIA: ICD-10-CM

## 2023-04-21 DIAGNOSIS — E03.9 ACQUIRED HYPOTHYROIDISM: ICD-10-CM

## 2023-04-21 DIAGNOSIS — E10.3553 STABLE PROLIFERATIVE DIABETIC RETINOPATHY OF BOTH EYES ASSOCIATED WITH TYPE 1 DIABETES MELLITUS (CMS/HCC): ICD-10-CM

## 2023-04-21 DIAGNOSIS — I21.4 NSTEMI (NON-ST ELEVATED MYOCARDIAL INFARCTION) (CMS/HCC): ICD-10-CM

## 2023-04-21 PROBLEM — J01.00 ACUTE NON-RECURRENT MAXILLARY SINUSITIS: Status: RESOLVED | Noted: 2022-10-27 | Resolved: 2023-04-21

## 2023-04-21 PROBLEM — F32.4 MAJOR DEPRESSIVE DISORDER WITH SINGLE EPISODE, IN PARTIAL REMISSION (CMS/HCC): Status: ACTIVE | Noted: 2023-04-21

## 2023-04-21 PROBLEM — I82.621 ACUTE DEEP VEIN THROMBOSIS (DVT) OF BRACHIAL VEIN OF RIGHT UPPER EXTREMITY (CMS/HCC): Status: RESOLVED | Noted: 2022-08-28 | Resolved: 2023-04-21

## 2023-04-21 PROCEDURE — 99214 OFFICE O/P EST MOD 30 MIN: CPT | Performed by: FAMILY MEDICINE

## 2023-04-21 PROCEDURE — 3008F BODY MASS INDEX DOCD: CPT | Performed by: FAMILY MEDICINE

## 2023-04-21 RX ORDER — BLOOD-GLUCOSE SENSOR
1 EACH MISCELLANEOUS
COMMUNITY
Start: 2023-04-03 | End: 2023-07-02

## 2023-04-21 RX ORDER — PEN NEEDLE, DIABETIC 31 GX5/16"
NEEDLE, DISPOSABLE MISCELLANEOUS
COMMUNITY
Start: 2023-03-28

## 2023-04-21 RX ORDER — INSULIN GLARGINE-YFGN 100 [IU]/ML
44 INJECTION, SOLUTION SUBCUTANEOUS NIGHTLY
COMMUNITY
Start: 2023-03-23 | End: 2023-06-21

## 2023-04-21 ASSESSMENT — PATIENT HEALTH QUESTIONNAIRE - PHQ9: SUM OF ALL RESPONSES TO PHQ9 QUESTIONS 1 & 2: 0

## 2023-04-21 NOTE — PROGRESS NOTES
Subjective      Patient ID: Annette Montes De Oca is a 53 y.o. female.      HPI    The following have been reviewed and updated as appropriate in this visit:   Allergies  Meds  Problems          Pt with hx of Type 1 dm hypothyroidism obesity diabetic retinopathy anxiety;  recent upper ext DVT and [id number] presents for follow up     Her step father recently passed away - has had 3 losses this year -  Coworkers are helpful -  Mom lives in [place].     Upper ext DVT completed anticoagulattion  - hypercoag eval per SARAH Carbone normal     Cardio ldl at goal due to see Dr mulligan in 1 yr with stress echo prior to visit   Cardio CTA   SUMMARY:  1.  Two-vessel nonobstructive coronary artery disease.  Please see above for  details.  Moderate amount of calcified plaque  2.  Normal cardiac structures  3.  Coronary calcium score 110.  This places the patient in the FRITZ 97th risk  percentile for age and gender matched individuals.  4.  Overall quality of the scan was good.       Lipids - atorvastin 10mg daily. LDL at goal    +optho - had laser for DR. +Proliferative Retinopathy; follows with optho - Dr. Carolina [facility]  In emr  9/8/2022  But she reports went last month    She did have followup with endo Dr Vega     He wants her to get back to using dexcom and hops she will be able to use pump in future  rec increasing lantus to 42 units by 2 units every 3 days to get to goal ( too low at 44)        Lab Results   Component Value Date    HGBA1C 9.3 (H) 03/17/2023    HGBA1C 8.5 (H) 07/08/2022    HGBA1C 8.2 (H) 03/23/2021     Lab Results   Component Value Date    MICROALBUR 3.6 07/08/2022    LDLCALC 69 03/17/2023    CREATININE 1.14 (H) 03/17/2023     Lab Results   Component Value Date    WBC 5.6 10/06/2017    HGB 14.3 10/06/2017    HCT 41.9 10/06/2017     (H) 10/06/2017    CHOL 138 03/17/2023    TRIG 68 03/17/2023    HDL 55 03/17/2023    ALT 11 03/17/2023    AST 14 03/17/2023     03/17/2023    K 4.1  "03/17/2023     03/17/2023    CREATININE 1.14 (H) 03/17/2023    BUN 12 03/17/2023    CO2 19 (L) 03/17/2023    TSH 1.810 07/08/2022    HGBA1C 9.3 (H) 03/17/2023    MICROALBUR 3.6 07/08/2022    LDLCALC 69 03/17/2023             Review of Systems      Current Outpatient Medications:   •  apixaban (ELIQUIS) 5 mg tablet, TAKE 2 TABLETS BY MOUTH TWICE DAILY FOR 7 DAYS THEN TAKE 1 TABLET BY MOUTH TWICE DAILY FOR 23 DAYS FOR ANTICOAGULANT THERAPY, Disp: , Rfl:   •  atorvastatin (LIPITOR) 20 mg tablet, Take 1 tablet (20 mg total) by mouth daily., Disp: 90 tablet, Rfl: 3  •  azelastine 0.15 % (205.5 mcg) nasal spray, ADMINISTER 1 SPRAY INTO EACH NOSTRIL 2 TIMES A DAY AS NEEDED FOR RHINITIS., Disp: 30 mL, Rfl: 4  •  BD ULTRA-FINE SHORT PEN NEEDLE 31 gauge x 5/16\" needle, 1 EACH DAILY. BD ULTRA FINE, Disp: , Rfl:   •  blood-glucose sensor (DEXCOM G6 SENSOR) device device, 1 each by Other route., Disp: , Rfl:   •  blood-glucose transmitter (DEXCOM G6 TRANSMITTER) device, USE ONE TRANSMITTER WITH /SENSORS AND REPLACE EVERY 3 MONTHS, Disp: , Rfl:   •  buPROPion XL (WELLBUTRIN XL) 150 mg 24 hr tablet, TAKE 1 TABLET BY MOUTH EVERY DAY, Disp: 90 tablet, Rfl: 1  •  CLINPRO 5000 1.1 % paste, BRUSH TEETH FOR 1 MINUTE TWICE A DAY DO NOT RINSE, EAT OR DRINK FOR 30 MINS AFTER, Disp: , Rfl:   •  fluticasone propionate (FLONASE) 50 mcg/actuation nasal spray, Administer 1 spray into each nostril daily., Disp: , Rfl:   •  insulin aspart (NovoLOG) 100 unit/mL patient supplied pump, Inject under the skin continuously., Disp: , Rfl:   •  insulin glargine U-100 (LANTUS) 100 unit/mL injection, Pt states she's on 42 units, Disp: , Rfl:   •  insulin glargine-yfgn 100 unit/mL (3 mL) insulin pen, Inject 44 Units under the skin nightly., Disp: , Rfl:   •  levothyroxine (SYNTHROID) 75 mcg tablet, 1 TABLET ON AN EMPTY STOMACH IN THE MORNING, Disp: , Rfl: 3  •  loratadine (CLARITIN) 10 mg tablet, TAKE 1 TABLET BY MOUTH EVERY DAY, Disp: 90 " "tablet, Rfl: 0  •  LOW-OGESTREL, 28, 0.3-30 mg-mcg per tablet, Take 1 tablet by mouth once daily., Disp: , Rfl:   •  montelukast (SINGULAIR) 10 mg tablet, TAKE 1 TABLET BY MOUTH EVERY DAY AT NIGHT, Disp: 30 tablet, Rfl: 5  •  NovoLIN N NPH U-100 Insulin 100 unit/mL injection, INJECT 40 UNITS SUBCUTANEOUS DAILY AT BEDTIME, Disp: , Rfl:   •  PARoxetine (PAXIL) 40 mg tablet, TAKE 1 TABLET BY MOUTH EVERY DAY, Disp: 90 tablet, Rfl: 1  •  zolpidem (AMBIEN) 5 mg tablet, TAKE 1 TABLET BY MOUTH EVERY DAY AT BEDTIME AS NEEDED FOR SLEEP, Disp: 30 tablet, Rfl: 1  Codeine, Cortisone, and Penicillins  Past Medical History:   Diagnosis Date   • Acute deep vein thrombosis (DVT) of brachial vein of right upper extremity (CMS/HCC) 8/28/2022   • Allergic    • Coronary artery disease    • Diabetes mellitus type I (CMS/HCC)    • Disease of thyroid gland    • Grand mal seizure (CMS/HCC)      Past Surgical History:   Procedure Laterality Date   • CARPAL TUNNEL RELEASE     • FOOT SURGERY     • HERNIA REPAIR     • MOUTH SURGERY     • OOPHORECTOMY     • SINUS SURGERY     • WISDOM TOOTH EXTRACTION       Objective     Vitals:    04/21/23 1111   BP: 126/82   BP Location: Left upper arm   Patient Position: Sitting   Pulse: 95   Resp: 16   Temp: 36.6 °C (97.9 °F)   TempSrc: Temporal   SpO2: 99%   Weight: 83 kg (183 lb)   Height: 1.6 m (5' 3\")       Body mass index is 32.42 kg/m².    Physical Exam  Constitutional:       Appearance: Normal appearance. She is well-developed. She is obese.   HENT:      Head: Normocephalic and atraumatic.   Neck:      Thyroid: No thyromegaly.   Cardiovascular:      Rate and Rhythm: Normal rate and regular rhythm.      Heart sounds: Normal heart sounds. No murmur heard.     No friction rub. No gallop.   Pulmonary:      Effort: Pulmonary effort is normal. No respiratory distress.      Breath sounds: Normal breath sounds. No wheezing or rales.   Musculoskeletal:      Cervical back: Normal range of motion and neck supple. "   Lymphadenopathy:      Cervical: No cervical adenopathy.   Neurological:      Mental Status: She is alert and oriented to person, place, and time.   Psychiatric:         Mood and Affect: Mood normal.         Behavior: Behavior normal.         Thought Content: Thought content normal.         Judgment: Judgment normal.         Assessment/Plan   Problem List Items Addressed This Visit        Circulatory    NSTEMI (non-ST elevated myocardial infarction) (CMS/[facility])       Endocrine/Metabolic    Hypothyroid    Type 1 diabetes mellitus with hyperglycemia (CMS/[facility]) - Primary    Relevant Medications    insulin glargine-yfgn 100 unit/mL (3 mL) insulin pen    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 1 diabetes mellitus (CMS/[facility])    Relevant Medications    insulin glargine-yfgn 100 unit/mL (3 mL) insulin pen       Other    Pure hypercholesterolemia   had mammo at [facility] Dr Linder  Had prevnar  20 at [facility] get  Date  Had diab eye in emr  9/8/2022 she reports also done last month     seeing endo  In June to followup    See me in 6 mos    Patient Instructions   Disc using dexcom to monitor BG  closely   Followup with endo           Ruba Beavers MD

## 2023-04-26 RX ORDER — ZOLPIDEM TARTRATE 5 MG/1
TABLET ORAL
Qty: 30 TABLET | Refills: 3 | Status: SHIPPED | OUTPATIENT
Start: 2023-04-26 | End: 2023-08-30 | Stop reason: SDUPTHER

## 2023-04-26 NOTE — TELEPHONE ENCOUNTER
Medication Request   Patient PCP: Ruba Beavers MD  Next Office Visit: Visit date not found  Has this provider prescribed this medication before?: Yes  Medication Name: zolpidem (AMBIEN) 5 mg tablet   Medication Dose: as directed  Medication Frequency: n/a  Preferred Pharmacy:   [facility]/pharmacy [facility][id number] - [place] - [address]  [address] [facility] [address]  Phone: [phone] Fax: [phone]      Please allow 2 business days for your provider to send your medication request or to reach out to discuss.

## 2023-05-09 ENCOUNTER — OFFICE VISIT (OUTPATIENT)
Dept: PRIMARY CARE | Facility: CLINIC | Age: 53
End: 2023-05-09
Payer: COMMERCIAL

## 2023-05-09 VITALS — OXYGEN SATURATION: 99 % | HEART RATE: 100 BPM | SYSTOLIC BLOOD PRESSURE: 124 MMHG | DIASTOLIC BLOOD PRESSURE: 72 MMHG

## 2023-05-09 DIAGNOSIS — J06.9 ACUTE URI: Primary | ICD-10-CM

## 2023-05-09 PROCEDURE — 99213 OFFICE O/P EST LOW 20 MIN: CPT | Performed by: NURSE PRACTITIONER

## 2023-05-09 RX ORDER — AZITHROMYCIN 250 MG/1
TABLET, FILM COATED ORAL
Qty: 6 TABLET | Refills: 0 | Status: SHIPPED | OUTPATIENT
Start: 2023-05-09 | End: 2023-05-14

## 2023-05-09 RX ORDER — INSULIN GLARGINE 100 [IU]/ML
INJECTION, SOLUTION SUBCUTANEOUS
COMMUNITY
Start: 2023-04-28 | End: 2024-04-19

## 2023-05-09 ASSESSMENT — ENCOUNTER SYMPTOMS
SORE THROAT: 1
FATIGUE: 0
DIZZINESS: 0
COUGH: 1
TROUBLE SWALLOWING: 0
SHORTNESS OF BREATH: 0
FEVER: 0
ADENOPATHY: 0
WEAKNESS: 0
SINUS PRESSURE: 1
STRIDOR: 0
HEADACHES: 0
PALPITATIONS: 0

## 2023-05-09 NOTE — PROGRESS NOTES
[address], [address]  [place] [address]  Phone [phone] Fax [phone]     Reason for visit:   Chief Complaint   Patient presents with   • Sore Throat      HPI   Annette Montes De Oca is a 53 y.o. female who presents with nasal congestion, post nasal drip, sore throat,  sinus pressure, L ear pressure and pain x 5 days. Admits mild dry cough.   No chest pain/sob/wheeze.   She has a hx of recurrent L ear infections.   Bad allergies this time of year.   Recent  service over the weekend and was around a lot of family members. No known sick contacts.            Medical History:  Past Medical History:   Diagnosis Date   • Acute deep vein thrombosis (DVT) of brachial vein of right upper extremity (CMS/HCC) 2022   • Allergic    • Coronary artery disease    • Diabetes mellitus type I (CMS/HCC)    • Disease of thyroid gland    • Grand mal seizure (CMS/HCC)        Surgical History:  Past Surgical History:   Procedure Laterality Date   • CARPAL TUNNEL RELEASE     • FOOT SURGERY     • HERNIA REPAIR     • MOUTH SURGERY     • OOPHORECTOMY     • SINUS SURGERY     • WISDOM TOOTH EXTRACTION         Social History:  Social History     Social History Narrative    Raf kohyasir       Family History:  Family History   Problem Relation Age of Onset   • Stroke Biological Brother    • Heart disease Maternal Grandmother    • Kidney disease Maternal Grandmother    • Prostate cancer Paternal Grandmother        Allergies:  Codeine, Cortisone, and Penicillins    Current Medications:  Current Outpatient Medications   Medication Sig Dispense Refill   • azithromycin (ZITHROMAX) 250 mg tablet Take 2 tablets the first day, then 1 tablet daily for 4 days. 6 tablet 0   • apixaban (ELIQUIS) 5 mg tablet TAKE 2 TABLETS BY MOUTH TWICE DAILY FOR 7 DAYS THEN TAKE 1 TABLET BY MOUTH TWICE DAILY FOR 23 DAYS FOR ANTICOAGULANT THERAPY     • atorvastatin (LIPITOR) 20 mg tablet Take 1 tablet (20 mg total) by  "mouth daily. 90 tablet 3   • azelastine 0.15 % (205.5 mcg) nasal spray ADMINISTER 1 SPRAY INTO EACH NOSTRIL 2 TIMES A DAY AS NEEDED FOR RHINITIS. 30 mL 4   • BD ULTRA-FINE SHORT PEN NEEDLE 31 gauge x 5/16\" needle 1 EACH DAILY. BD ULTRA FINE     • blood-glucose sensor (DEXCOM G6 SENSOR) device device 1 each by Other route.     • blood-glucose transmitter (DEXCOM G6 TRANSMITTER) device USE ONE TRANSMITTER WITH /SENSORS AND REPLACE EVERY 3 MONTHS     • buPROPion XL (WELLBUTRIN XL) 150 mg 24 hr tablet TAKE 1 TABLET BY MOUTH EVERY DAY 90 tablet 1   • CLINPRO 5000 1.1 % paste BRUSH TEETH FOR 1 MINUTE TWICE A DAY DO NOT RINSE, EAT OR DRINK FOR 30 MINS AFTER     • fluticasone propionate (FLONASE) 50 mcg/actuation nasal spray Administer 1 spray into each nostril daily.     • insulin aspart (NovoLOG) 100 unit/mL patient supplied pump Inject under the skin continuously.     • insulin glargine U-100 (LANTUS) 100 unit/mL injection Pt states she's on 42 units     • insulin glargine-yfgn 100 unit/mL (3 mL) insulin pen Inject 44 Units under the skin nightly.     • LANTUS SOLOSTAR U-100 INSULIN 100 unit/mL (3 mL) pen INJECT 44 UNITS UNDER THE SKIN NIGHTLY.     • levothyroxine (SYNTHROID) 75 mcg tablet 1 TABLET ON AN EMPTY STOMACH IN THE MORNING  3   • loratadine (CLARITIN) 10 mg tablet TAKE 1 TABLET BY MOUTH EVERY DAY 90 tablet 0   • LOW-OGESTREL, 28, 0.3-30 mg-mcg per tablet Take 1 tablet by mouth once daily.     • montelukast (SINGULAIR) 10 mg tablet TAKE 1 TABLET BY MOUTH EVERY DAY AT NIGHT 30 tablet 5   • NovoLIN N NPH U-100 Insulin 100 unit/mL injection INJECT 40 UNITS SUBCUTANEOUS DAILY AT BEDTIME     • PARoxetine (PAXIL) 40 mg tablet TAKE 1 TABLET BY MOUTH EVERY DAY 90 tablet 1   • zolpidem (AMBIEN) 5 mg tablet TAKE 1 TABLET BY MOUTH EVERY DAY AT BEDTIME AS NEEDED FOR SLEEP 30 tablet 3     No current facility-administered medications for this visit.       Review of Systems:  Review of Systems   Constitutional: " Negative for fatigue and fever.   HENT: Positive for congestion, ear pain, sinus pressure and sore throat. Negative for trouble swallowing.    Respiratory: Positive for cough. Negative for shortness of breath and stridor.    Cardiovascular: Negative for chest pain and palpitations.   Neurological: Negative for dizziness, weakness and headaches.   Hematological: Negative for adenopathy.       Objective     Vital Signs:  Vitals:    05/09/23 1809   BP: 124/72   Pulse: 100   SpO2: 99%       BMI:  There is no height or weight on file to calculate BMI.     Physical Exam  Vitals reviewed.   Constitutional:       Appearance: Normal appearance.   HENT:      Head: Normocephalic and atraumatic.      Right Ear: Tympanic membrane and ear canal normal.      Left Ear: Ear canal normal. Tympanic membrane is erythematous.      Nose: Congestion present.      Mouth/Throat:      Pharynx: Posterior oropharyngeal erythema present. No oropharyngeal exudate.   Cardiovascular:      Heart sounds: Normal heart sounds.   Pulmonary:      Effort: Pulmonary effort is normal.      Breath sounds: Normal breath sounds.   Lymphadenopathy:      Cervical: No cervical adenopathy.   Neurological:      Mental Status: She is alert and oriented to person, place, and time.   Psychiatric:         Mood and Affect: Mood normal.         Behavior: Behavior normal.         Recent labs before today:     Lab Results   Component Value Date    WBC 5.6 10/06/2017    HGB 14.3 10/06/2017    HCT 41.9 10/06/2017     (H) 10/06/2017    CHOL 138 03/17/2023    TRIG 68 03/17/2023    HDL 55 03/17/2023    ALT 11 03/17/2023    AST 14 03/17/2023     03/17/2023    K 4.1 03/17/2023     03/17/2023    CREATININE 1.14 (H) 03/17/2023    BUN 12 03/17/2023    CO2 19 (L) 03/17/2023    TSH 1.810 07/08/2022    HGBA1C 9.3 (H) 03/17/2023    MICROALBUR 3.6 07/08/2022        Procedures   Assessment     There are no Patient Instructions on file for this visit.    Problem List  Items Addressed This Visit        Infectious/Inflammatory    Acute URI - Primary     Continue supportive care, continue allergy medications.   Continue to closely monitor blood glucose.  ENT evaluation advised for recurrent sinus and ear infections, contact info provided.  Pt advised to update office if symptoms persist or worsen.            Relevant Medications    azithromycin (ZITHROMAX) 250 mg tablet             CLINTON Caraballo  5/9/2023

## 2023-05-09 NOTE — ASSESSMENT & PLAN NOTE
Continue supportive care, continue allergy medications.   Continue to closely monitor blood glucose.  ENT evaluation advised for recurrent sinus and ear infections, contact info provided.  Pt advised to update office if symptoms persist or worsen.

## 2023-05-17 DIAGNOSIS — J30.89 OTHER ALLERGIC RHINITIS: ICD-10-CM

## 2023-05-17 RX ORDER — LORATADINE 10 MG/1
TABLET ORAL
Qty: 30 TABLET | Refills: 2 | Status: SHIPPED | OUTPATIENT
Start: 2023-05-17 | End: 2023-08-14

## 2023-05-17 RX ORDER — MONTELUKAST SODIUM 10 MG/1
TABLET ORAL
Qty: 30 TABLET | Refills: 5 | Status: SHIPPED | OUTPATIENT
Start: 2023-05-17 | End: 2023-11-06

## 2023-05-26 ENCOUNTER — OFFICE VISIT (OUTPATIENT)
Dept: PRIMARY CARE | Facility: CLINIC | Age: 53
End: 2023-05-26
Payer: COMMERCIAL

## 2023-05-26 VITALS
HEIGHT: 63 IN | TEMPERATURE: 97.9 F | OXYGEN SATURATION: 99 % | HEART RATE: 104 BPM | WEIGHT: 183 LBS | SYSTOLIC BLOOD PRESSURE: 124 MMHG | BODY MASS INDEX: 32.43 KG/M2 | DIASTOLIC BLOOD PRESSURE: 72 MMHG | RESPIRATION RATE: 16 BRPM

## 2023-05-26 DIAGNOSIS — E10.65 TYPE 1 DIABETES MELLITUS WITH HYPERGLYCEMIA (CMS/HCC): ICD-10-CM

## 2023-05-26 DIAGNOSIS — R35.0 URINARY FREQUENCY: Primary | ICD-10-CM

## 2023-05-26 PROCEDURE — 3008F BODY MASS INDEX DOCD: CPT | Performed by: NURSE PRACTITIONER

## 2023-05-26 PROCEDURE — 99214 OFFICE O/P EST MOD 30 MIN: CPT | Performed by: NURSE PRACTITIONER

## 2023-05-26 RX ORDER — NITROFURANTOIN 25; 75 MG/1; MG/1
100 CAPSULE ORAL 2 TIMES DAILY
Qty: 10 CAPSULE | Refills: 0 | Status: SHIPPED | OUTPATIENT
Start: 2023-05-26 | End: 2023-05-31

## 2023-05-26 ASSESSMENT — ENCOUNTER SYMPTOMS
DYSURIA: 1
DIFFICULTY URINATING: 0
CONSTITUTIONAL NEGATIVE: 1
FLANK PAIN: 0
GASTROINTESTINAL NEGATIVE: 1
PSYCHIATRIC NEGATIVE: 1
HEMATURIA: 0
FREQUENCY: 1

## 2023-05-26 NOTE — PROGRESS NOTES
[facility]   [address] [place] [address], [address]  [place] [address]  Phone: [phone]  Fax:[phone]        Subjective      Patient ID: Annette Montes De Oca is a 53 y.o. female.  1970      Urinary symptoms for the last day   + frequency and urgency   Foul smelling urine   No flank pain   No fevers   Is diabetic and her sugars have been elevated   Follows with endocrine and reached out to them as well         The following have been reviewed and updated as appropriate in this visit:        Review of Systems   Constitutional: Negative.    Gastrointestinal: Negative.    Genitourinary: Positive for dysuria, frequency and urgency. Negative for decreased urine volume, difficulty urinating, dyspareunia, flank pain, genital sores, hematuria, menstrual problem, pelvic pain, vaginal bleeding, vaginal discharge and vaginal pain.   Psychiatric/Behavioral: Negative.      Patient Active Problem List   Diagnosis   • Anxiety   • Hypothyroid   • Iron deficiency anemia   • Paronychia of toe of left foot   • Seasonal allergies   • Type 1 diabetes mellitus with hyperglycemia (CMS/HCC)   • 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 1 diabetes mellitus (CMS/HCC)   • NSTEMI (non-ST elevated myocardial infarction) (CMS/HCC)   • Cataract of right eye   • Family history of cerebrovascular accident (CVA)   • Pure hypercholesterolemia   • Left acute otitis media   • Major depressive disorder with single episode, in partial remission (CMS/HCC)   • Acute URI   • Urinary frequency      Past Medical History:   Diagnosis Date   • Acute deep vein thrombosis (DVT) of brachial vein of right upper extremity (CMS/HCC) 8/28/2022   • Allergic    • Coronary artery disease    • Diabetes mellitus type I (CMS/HCC)    • Disease of thyroid gland    • Grand mal seizure (CMS/HCC)      Past Surgical History:   Procedure Laterality Date   • CARPAL TUNNEL RELEASE     • FOOT SURGERY     • HERNIA  "REPAIR     • MOUTH SURGERY     • OOPHORECTOMY     • SINUS SURGERY     • WISDOM TOOTH EXTRACTION       Social History     Socioeconomic History   • Marital status:      Spouse name: None   • Number of children: None   • Years of education: None   • Highest education level: None   Tobacco Use   • Smoking status: Never   • Smokeless tobacco: Never   Vaping Use   • Vaping status: Never Used   Substance and Sexual Activity   • Alcohol use: No   • Drug use: No   • Sexual activity: Defer   Social History Narrative    [employer]     Objective     Vitals:    05/26/23 1107   BP: 124/72   BP Location: Right upper arm   Patient Position: Sitting   Pulse: (!) 104   Resp: 16   Temp: 36.6 °C (97.9 °F)   TempSrc: Temporal   SpO2: 99%   Weight: 83 kg (183 lb)   Height: 1.6 m (5' 3\")     Body mass index is 32.42 kg/m².  Current Outpatient Medications   Medication Sig Dispense Refill   • apixaban (ELIQUIS) 5 mg tablet TAKE 2 TABLETS BY MOUTH TWICE DAILY FOR 7 DAYS THEN TAKE 1 TABLET BY MOUTH TWICE DAILY FOR 23 DAYS FOR ANTICOAGULANT THERAPY     • atorvastatin (LIPITOR) 20 mg tablet Take 1 tablet (20 mg total) by mouth daily. 90 tablet 3   • azelastine 0.15 % (205.5 mcg) nasal spray ADMINISTER 1 SPRAY INTO EACH NOSTRIL 2 TIMES A DAY AS NEEDED FOR RHINITIS. 30 mL 4   • BD ULTRA-FINE SHORT PEN NEEDLE 31 gauge x 5/16\" needle 1 EACH DAILY. BD ULTRA FINE     • blood-glucose sensor (DEXCOM G6 SENSOR) device device 1 each by Other route.     • blood-glucose transmitter (DEXCOM G6 TRANSMITTER) device USE ONE TRANSMITTER WITH /SENSORS AND REPLACE EVERY 3 MONTHS     • buPROPion XL (WELLBUTRIN XL) 150 mg 24 hr tablet TAKE 1 TABLET BY MOUTH EVERY DAY 90 tablet 1   • CLINPRO 5000 1.1 % paste BRUSH TEETH FOR 1 MINUTE TWICE A DAY DO NOT RINSE, EAT OR DRINK FOR 30 MINS AFTER     • fluticasone propionate (FLONASE) 50 mcg/actuation nasal spray Administer 1 spray into each nostril daily.     • insulin aspart (NovoLOG) 100 unit/mL patient " supplied pump Inject under the skin continuously.     • insulin glargine U-100 (LANTUS) 100 unit/mL injection Pt states she's on 42 units     • insulin glargine-yfgn 100 unit/mL (3 mL) insulin pen Inject 44 Units under the skin nightly.     • LANTUS SOLOSTAR U-100 INSULIN 100 unit/mL (3 mL) pen INJECT 44 UNITS UNDER THE SKIN NIGHTLY.     • levothyroxine (SYNTHROID) 75 mcg tablet 1 TABLET ON AN EMPTY STOMACH IN THE MORNING  3   • loratadine (CLARITIN) 10 mg tablet TAKE 1 TABLET BY MOUTH EVERY DAY 30 tablet 2   • LOW-OGESTREL, 28, 0.3-30 mg-mcg per tablet Take 1 tablet by mouth once daily.     • montelukast (SINGULAIR) 10 mg tablet TAKE 1 TABLET BY MOUTH EVERY DAY AT NIGHT 30 tablet 5   • nitrofurantoin, macrocrystal-monohydrate, (MACROBID) 100 mg capsule Take 1 capsule (100 mg total) by mouth 2 (two) times a day for 5 days. 10 capsule 0   • NovoLIN N NPH U-100 Insulin 100 unit/mL injection INJECT 40 UNITS SUBCUTANEOUS DAILY AT BEDTIME     • PARoxetine (PAXIL) 40 mg tablet TAKE 1 TABLET BY MOUTH EVERY DAY 90 tablet 1   • zolpidem (AMBIEN) 5 mg tablet TAKE 1 TABLET BY MOUTH EVERY DAY AT BEDTIME AS NEEDED FOR SLEEP 30 tablet 3     No current facility-administered medications for this visit.       Physical Exam  Vitals reviewed.   Constitutional:       Appearance: Normal appearance. She is normal weight.   Abdominal:      General: Abdomen is flat. There is no distension.      Palpations: Abdomen is soft.      Tenderness: There is no abdominal tenderness. There is no right CVA tenderness or left CVA tenderness.   Neurological:      General: No focal deficit present.      Mental Status: She is alert and oriented to person, place, and time.   Psychiatric:         Mood and Affect: Mood normal.         Behavior: Behavior normal.         Thought Content: Thought content normal.         Assessment/Plan     Problem List Items Addressed This Visit        Endocrine/Metabolic    Type 1 diabetes mellitus with hyperglycemia (CMS/HCC)     Current Assessment & Plan     F/u with endocrine             Other    Urinary frequency - Primary    Current Assessment & Plan       Increase fluid intake   Complete antibiotic as prescribed - macrobid   We sent your urine to the lab for culture   Will call or message you with your urine culture results           Relevant Orders    Urine culture (clean catch)    Urinalysis with microscopic       CLINTON Spencer

## 2023-05-26 NOTE — PATIENT INSTRUCTIONS
Thank you for allowing me to participate in your care, it was a pleasure to care for you today. Should any questions or concerns arise, please call or message me on My Chart.       Increase fluid intake   Complete antibiotic as prescribed - macrobid   We sent your urine to the lab for culture   Will call or message you with your urine culture results

## 2023-05-26 NOTE — ASSESSMENT & PLAN NOTE
Increase fluid intake   Complete antibiotic as prescribed - macrobid   We sent your urine to the lab for culture   Will call or message you with your urine culture results

## 2023-05-27 LAB
APPEARANCE UR: CLEAR
BACTERIA #/AREA URNS HPF: NORMAL /[HPF]
BACTERIA UR CULT: NORMAL
BACTERIA UR CULT: NORMAL
BILIRUB UR QL STRIP: NEGATIVE
CASTS URNS QL MICRO: NORMAL /LPF
COLOR UR: YELLOW
EPI CELLS #/AREA URNS HPF: NORMAL /HPF (ref 0–10)
GLUCOSE UR QL STRIP: NEGATIVE
HGB UR QL STRIP: NEGATIVE
KETONES UR QL STRIP: NEGATIVE
LEUKOCYTE ESTERASE UR QL STRIP: ABNORMAL
MICRO URNS: ABNORMAL
NITRITE UR QL STRIP: NEGATIVE
PH UR STRIP: 6.5 [PH] (ref 5–7.5)
PROT UR QL STRIP: NEGATIVE
RBC #/AREA URNS HPF: NORMAL /HPF (ref 0–2)
SP GR UR STRIP: 1.01 (ref 1–1.03)
UROBILINOGEN UR STRIP-MCNC: 0.2 MG/DL (ref 0.2–1)
WBC #/AREA URNS HPF: NORMAL /HPF (ref 0–5)

## 2023-08-01 ENCOUNTER — OFFICE VISIT (OUTPATIENT)
Dept: PRIMARY CARE | Facility: CLINIC | Age: 53
End: 2023-08-01
Payer: COMMERCIAL

## 2023-08-01 VITALS
HEART RATE: 100 BPM | TEMPERATURE: 97.9 F | RESPIRATION RATE: 18 BRPM | SYSTOLIC BLOOD PRESSURE: 140 MMHG | OXYGEN SATURATION: 97 % | BODY MASS INDEX: 32.43 KG/M2 | HEIGHT: 63 IN | WEIGHT: 183 LBS | DIASTOLIC BLOOD PRESSURE: 78 MMHG

## 2023-08-01 DIAGNOSIS — E10.65 TYPE 1 DIABETES MELLITUS WITH HYPERGLYCEMIA (CMS/HCC): ICD-10-CM

## 2023-08-01 DIAGNOSIS — F32.1 CURRENT MODERATE EPISODE OF MAJOR DEPRESSIVE DISORDER WITHOUT PRIOR EPISODE (CMS/HCC): Primary | ICD-10-CM

## 2023-08-01 DIAGNOSIS — E10.3553 STABLE PROLIFERATIVE DIABETIC RETINOPATHY OF BOTH EYES ASSOCIATED WITH TYPE 1 DIABETES MELLITUS (CMS/HCC): ICD-10-CM

## 2023-08-01 DIAGNOSIS — I21.4 NSTEMI (NON-ST ELEVATED MYOCARDIAL INFARCTION) (CMS/HCC): ICD-10-CM

## 2023-08-01 PROCEDURE — 99214 OFFICE O/P EST MOD 30 MIN: CPT | Performed by: FAMILY MEDICINE

## 2023-08-01 PROCEDURE — 3008F BODY MASS INDEX DOCD: CPT | Performed by: FAMILY MEDICINE

## 2023-08-01 RX ORDER — PAROXETINE 10 MG/1
10 TABLET, FILM COATED ORAL DAILY
Qty: 30 TABLET | Refills: 0 | Status: SHIPPED | OUTPATIENT
Start: 2023-08-01 | End: 2023-08-17 | Stop reason: SDUPTHER

## 2023-08-01 RX ORDER — BLOOD-GLUCOSE SENSOR
1 EACH MISCELLANEOUS
COMMUNITY
Start: 2023-07-10 | End: 2024-07-09

## 2023-08-01 RX ORDER — ESCITALOPRAM OXALATE 10 MG/1
10 TABLET ORAL DAILY
Qty: 30 TABLET | Refills: 1 | Status: SHIPPED | OUTPATIENT
Start: 2023-08-01 | End: 2023-09-25

## 2023-08-01 ASSESSMENT — PATIENT HEALTH QUESTIONNAIRE - PHQ9
SUM OF ALL RESPONSES TO PHQ9 QUESTIONS 1 & 2: 2
SUM OF ALL RESPONSES TO PHQ QUESTIONS 1-9: 7

## 2023-08-01 NOTE — PATIENT INSTRUCTIONS
Start lexapro 1/2 tab of 10 mg (=5 mg ) in am with wellbutrin  and decrease paxil to 20 mg  in evening  for two weeks    Then increase lexapro to full tab ( =10 mg) and wellbutrin for second week and decrease paxil to 10 mg for 2 more weeks     Then stop paxil all together at end of 4 weeks     See your  for therapy as we discussed  Emerita Gutierres is therapist with [facility] in my office   Or you can call your insurance to get list of psychologists who take your insurance     Call me if any concerns

## 2023-08-01 NOTE — PROGRESS NOTES
Subjective      Patient ID: Annette Montes De Oca is a 53 y.o. female.      HPI    The following have been reviewed and updated as appropriate in this visit:   Tobacco  Allergies  Meds  Problems  Med Hx  Surg Hx  Fam Hx        Pt presents to discuss depression grieff     Multiple losses     She plans to see her  who is trained as a therapist - has seen him in past for therapy    Yesterday anniversary of brothers death and when she had seizure and cardiac arrest last year.       does grief counseling and heping her mother     Her mother is on lexapro and doing well      She feels her antidepressants are not working paxil 40 and wellbutin 150 xl has been on for 15-18 yrs     Did help in past - started taking when she was          08/01/23 1602   Depression Screening   Will the patient answer the depression questions? Y   Little interest or pleasure in doing things Several days   Feeling down, depressed, or hopeless Several days   Depression Risk 2   Over the last 2 weeks, how often have you been bothered by any of the following problems?    PHQ-2 Statement PHQ-2 result is positive   Over the last 2 weeks, how often have you been bothered by any of the following problems?   Trouble falling or staying asleep, or sleeping too much Several days   Feeling tired or having little energy Several days   Poor appetite or overeating Several days   Feeling bad about yourself - or that you are a failure or have let yourself or your family down Not at all   Trouble concentrating on things, such as reading the newspaper or watching television Several days   Moving or speaking so slowly that other people could have noticed? Or the opposite - being so fidgety or restless that you have been moving around a lot more than usual. Several days   Thoughts that you would be better off dead or hurting yourself in some way Not at all   Depression Risk Score 7   If You Checked Off Any Problems, How Difficult Have These  "Problems Made It For You to Do Your Work, Take Care of Things at Home, or Get Along with Other People? somewhat difficult   PHQ-9 interpretation PHQ result may indicate mild depression           Review of Systems      Current Outpatient Medications:   •  blood-glucose sensor (DEXCOM G6 SENSOR) device device, 1 each by Other route., Disp: , Rfl:   •  escitalopram (LEXAPRO) 10 mg tablet, Take 1 tablet (10 mg total) by mouth daily., Disp: 30 tablet, Rfl: 1  •  PARoxetine (PAXIL) 10 mg tablet, Take 1 tablet (10 mg total) by mouth daily. As discussed as tapering off paxil and starting on lexapro, Disp: 30 tablet, Rfl: 0  •  apixaban (ELIQUIS) 5 mg tablet, TAKE 2 TABLETS BY MOUTH TWICE DAILY FOR 7 DAYS THEN TAKE 1 TABLET BY MOUTH TWICE DAILY FOR 23 DAYS FOR ANTICOAGULANT THERAPY, Disp: , Rfl:   •  atorvastatin (LIPITOR) 20 mg tablet, Take 1 tablet (20 mg total) by mouth daily., Disp: 90 tablet, Rfl: 3  •  azelastine 0.15 % (205.5 mcg) nasal spray, ADMINISTER 1 SPRAY INTO EACH NOSTRIL 2 TIMES A DAY AS NEEDED FOR RHINITIS., Disp: 30 mL, Rfl: 4  •  BD ULTRA-FINE SHORT PEN NEEDLE 31 gauge x 5/16\" needle, 1 EACH DAILY. BD ULTRA FINE, Disp: , Rfl:   •  blood-glucose transmitter (DEXCOM G6 TRANSMITTER) device, USE ONE TRANSMITTER WITH /SENSORS AND REPLACE EVERY 3 MONTHS, Disp: , Rfl:   •  buPROPion XL (WELLBUTRIN XL) 150 mg 24 hr tablet, TAKE 1 TABLET BY MOUTH EVERY DAY, Disp: 90 tablet, Rfl: 1  •  CLINPRO 5000 1.1 % paste, BRUSH TEETH FOR 1 MINUTE TWICE A DAY DO NOT RINSE, EAT OR DRINK FOR 30 MINS AFTER, Disp: , Rfl:   •  fluticasone propionate (FLONASE) 50 mcg/actuation nasal spray, Administer 1 spray into each nostril daily., Disp: , Rfl:   •  insulin aspart (NovoLOG) 100 unit/mL patient supplied pump, Inject under the skin continuously., Disp: , Rfl:   •  insulin glargine U-100 (LANTUS) 100 unit/mL injection, Pt states she's on 42 units, Disp: , Rfl:   •  LANTUS SOLOSTAR U-100 INSULIN 100 unit/mL (3 mL) pen, INJECT " "44 UNITS UNDER THE SKIN NIGHTLY., Disp: , Rfl:   •  levothyroxine (SYNTHROID) 75 mcg tablet, 1 TABLET ON AN EMPTY STOMACH IN THE MORNING, Disp: , Rfl: 3  •  loratadine (CLARITIN) 10 mg tablet, TAKE 1 TABLET BY MOUTH EVERY DAY, Disp: 30 tablet, Rfl: 2  •  montelukast (SINGULAIR) 10 mg tablet, TAKE 1 TABLET BY MOUTH EVERY DAY AT NIGHT, Disp: 30 tablet, Rfl: 5  •  NovoLIN N NPH U-100 Insulin 100 unit/mL injection, INJECT 40 UNITS SUBCUTANEOUS DAILY AT BEDTIME, Disp: , Rfl:   •  zolpidem (AMBIEN) 5 mg tablet, TAKE 1 TABLET BY MOUTH EVERY DAY AT BEDTIME AS NEEDED FOR SLEEP, Disp: 30 tablet, Rfl: 3  Codeine, Cortisone, and Penicillins  Past Medical History:   Diagnosis Date   • Acute deep vein thrombosis (DVT) of brachial vein of right upper extremity (CMS/HCC) 8/28/2022   • Allergic    • Coronary artery disease    • Diabetes mellitus type I (CMS/HCC)    • Disease of thyroid gland    • Grand mal seizure (CMS/HCC)      Past Surgical History:   Procedure Laterality Date   • CARPAL TUNNEL RELEASE     • FOOT SURGERY     • HERNIA REPAIR     • MOUTH SURGERY     • OOPHORECTOMY     • SINUS SURGERY     • WISDOM TOOTH EXTRACTION       Objective     Vitals:    08/01/23 1544   BP: 140/78   BP Location: Right upper arm   Patient Position: Sitting   Pulse: 100   Resp: 18   Temp: 36.6 °C (97.9 °F)   TempSrc: Temporal   SpO2: 97%   Weight: 83 kg (183 lb)   Height: 1.6 m (5' 3\")       Body mass index is 32.42 kg/m².    Physical Exam    Assessment/Plan   Problem List Items Addressed This Visit        Circulatory    NSTEMI (non-ST elevated myocardial infarction) (CMS/HCC)       Endocrine/Metabolic    Type 1 diabetes mellitus with hyperglycemia (CMS/[facility])    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 1 diabetes mellitus (CMS/HCC)       Mental Health    Current moderate episode of major depressive disorder without prior episode (CMS/[facility]) - Primary    Relevant Medications    escitalopram (LEXAPRO) 10 mg tablet "    PARoxetine (PAXIL) 10 mg tablet     Disc grief and that medications generally are not treatment for grief that counseling and time working with therapist is usually more effective     And disc anniversaries like this are particularly hard times     I am reluctant to change medicattons she ahs been on for so long - disc long on med harder to taper off and not clear alternatives will work better     Disc med options other SSRI like lexapro her mother has done well on   vss snri  effexor pristique     Disc with taper off can cause worsening of mood depression thoughts of harm\    She does want to try taperin disc careful taper as below   And follow up in 3-4 weeks     Also disc working with therapist -   may wan tot also have a therapist in medical system and  as additional support I spent 38 minutes on this date of service performing the following activities: obtaining history, entering orders, documenting, preparing for visit, obtaining / reviewing records, providing counseling and education, communicating results and coordinating care      Patient Instructions   Start lexapro 1/2 tab of 10 mg (=5 mg ) in am with wellbutrin  and decrease paxil to 20 mg  in evening  for two weeks    Then increase lexapro to full tab ( =10 mg) and wellbutrin for second week and decrease paxil to 10 mg for 2 more weeks     Then stop paxil all together at end of 4 weeks     See your  for therapy as we discussed  Emerita Gutierres is therapist with [facility] in my office   Or you can call your insurance to get list of psychologists who take your insurance     Call me if any concerns        Ruba Beavers MD

## 2023-08-02 NOTE — NURSING NOTE
08/01/23 1608   Depression Screening   Will the patient answer the depression questions? Y   Little interest or pleasure in doing things Several days   Feeling down, depressed, or hopeless Several days   Depression Risk 2   Over the last 2 weeks, how often have you been bothered by any of the following problems?    PHQ-2 Statement PHQ-2 result is positive   Over the last 2 weeks, how often have you been bothered by any of the following problems?   Trouble falling or staying asleep, or sleeping too much Several days   Feeling tired or having little energy Several days   Poor appetite or overeating Several days   Feeling bad about yourself - or that you are a failure or have let yourself or your family down Not at all   Trouble concentrating on things, such as reading the newspaper or watching television Several days   Moving or speaking so slowly that other people could have noticed? Or the opposite - being so fidgety or restless that you have been moving around a lot more than usual. Several days   Thoughts that you would be better off dead or hurting yourself in some way Not at all   Depression Risk Score 7   If You Checked Off Any Problems, How Difficult Have These Problems Made It For You to Do Your Work, Take Care of Things at Home, or Get Along with Other People? somewhat difficult   PHQ-9 interpretation PHQ result may indicate mild depression

## 2023-08-13 DIAGNOSIS — J30.89 OTHER ALLERGIC RHINITIS: ICD-10-CM

## 2023-08-14 RX ORDER — LORATADINE 10 MG/1
TABLET ORAL
Qty: 30 TABLET | Refills: 2 | Status: SHIPPED | OUTPATIENT
Start: 2023-08-14 | End: 2023-11-06

## 2023-08-17 NOTE — TELEPHONE ENCOUNTER
Pt called for a refill of her:    PARoxetine (PAXIL) 10 mg tablet    Pt would like medication to go to:  [facility]/pharmacy #[id number] - [place] - [address]   [address] [address]   Phone:  [phone]  Fax:  [phone]

## 2023-08-18 RX ORDER — PAROXETINE 10 MG/1
10 TABLET, FILM COATED ORAL DAILY
Qty: 30 TABLET | Refills: 0 | Status: SHIPPED | OUTPATIENT
Start: 2023-08-18 | End: 2023-09-12

## 2023-08-30 RX ORDER — ZOLPIDEM TARTRATE 5 MG/1
5 TABLET ORAL NIGHTLY
Qty: 30 TABLET | Refills: 1 | Status: SHIPPED | OUTPATIENT
Start: 2023-08-30 | End: 2023-10-27

## 2023-08-31 DIAGNOSIS — F32.1 CURRENT MODERATE EPISODE OF MAJOR DEPRESSIVE DISORDER WITHOUT PRIOR EPISODE (CMS/HCC): ICD-10-CM

## 2023-08-31 DIAGNOSIS — F41.9 ANXIETY: Primary | ICD-10-CM

## 2023-08-31 RX ORDER — BUPROPION HYDROCHLORIDE 150 MG/1
TABLET ORAL
Qty: 30 TABLET | Refills: 5 | Status: SHIPPED | OUTPATIENT
Start: 2023-08-31 | End: 2024-02-01

## 2023-09-12 ENCOUNTER — OFFICE VISIT (OUTPATIENT)
Dept: PRIMARY CARE | Facility: CLINIC | Age: 53
End: 2023-09-12
Payer: COMMERCIAL

## 2023-09-12 DIAGNOSIS — J06.9 UPPER RESPIRATORY TRACT INFECTION, UNSPECIFIED TYPE: Primary | ICD-10-CM

## 2023-09-12 PROCEDURE — 99213 OFFICE O/P EST LOW 20 MIN: CPT | Performed by: NURSE PRACTITIONER

## 2023-09-12 RX ORDER — FLUTICASONE PROPIONATE 50 MCG
1 SPRAY, SUSPENSION (ML) NASAL DAILY
Qty: 16 G | Refills: 1 | Status: SHIPPED | OUTPATIENT
Start: 2023-09-12 | End: 2024-03-11

## 2023-09-12 RX ORDER — AZITHROMYCIN 250 MG/1
TABLET, FILM COATED ORAL
Qty: 6 TABLET | Refills: 0 | Status: SHIPPED | OUTPATIENT
Start: 2023-09-12 | End: 2024-09-17 | Stop reason: SDUPTHER

## 2023-09-12 ASSESSMENT — ENCOUNTER SYMPTOMS
HEADACHES: 0
RESPIRATORY NEGATIVE: 1
RHINORRHEA: 0
SINUS PAIN: 0
PSYCHIATRIC NEGATIVE: 1
DIZZINESS: 0
LIGHT-HEADEDNESS: 0
CARDIOVASCULAR NEGATIVE: 1
SORE THROAT: 0
SINUS PRESSURE: 0
CONSTITUTIONAL NEGATIVE: 1
TROUBLE SWALLOWING: 0

## 2023-09-12 NOTE — PROGRESS NOTES
[facility]   [address] [place] [address], [address]  [facility] [place] [address]  Phone: [phone]  Fax:[phone]        Subjective      Patient ID: Annette Montes De Oca is a 53 y.o. female.  1970      Sick for the last 2 weeks   Congestion  ,runny nose   No fevers   No sob , minimal cough, mostly PND  Hx of allergies   Ran out of flonase - would like a refill         The following have been reviewed and updated as appropriate in this visit:   Allergies  Meds  Problems       Review of Systems   Constitutional: Negative.    HENT: Negative for congestion, ear discharge, ear pain, postnasal drip, rhinorrhea, sinus pressure, sinus pain, sore throat and trouble swallowing.    Respiratory: Negative.    Cardiovascular: Negative.    Neurological: Negative for dizziness, light-headedness and headaches.   Psychiatric/Behavioral: Negative.      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 1 diabetes mellitus (CMS/HCC)    NSTEMI (non-ST elevated myocardial infarction) (CMS/HCC)    Cataract of right eye    Family history of cerebrovascular accident (CVA)    Pure hypercholesterolemia    Left acute otitis media    Current moderate episode of major depressive disorder without prior episode (CMS/HCC)    Upper respiratory tract infection    Urinary frequency      Past Medical History:   Diagnosis Date    Acute deep vein thrombosis (DVT) of brachial vein of right upper extremity (CMS/HCC) 8/28/2022    Allergic     Coronary artery disease     Diabetes mellitus type I (CMS/HCC)     Disease of thyroid gland     Grand mal seizure (CMS/HCC)      Past Surgical History:   Procedure Laterality Date    CARPAL TUNNEL RELEASE      FOOT SURGERY      HERNIA  "REPAIR      MOUTH SURGERY      OOPHORECTOMY      SINUS SURGERY      WISDOM TOOTH EXTRACTION       Social History     Socioeconomic History    Marital status:    Tobacco Use    Smoking status: Never    Smokeless tobacco: Never   Vaping Use    Vaping Use: Never used   Substance and Sexual Activity    Alcohol use: No    Drug use: No    Sexual activity: Defer   Social History Narrative    [employer]     Objective     There were no vitals filed for this visit.  There is no height or weight on file to calculate BMI.  Current Outpatient Medications   Medication Sig Dispense Refill    atorvastatin (LIPITOR) 20 mg tablet Take 1 tablet (20 mg total) by mouth daily. 90 tablet 3    azithromycin (ZITHROMAX) 250 mg tablet Take 2 tablets the first day, then 1 tablet daily for 4 days. 6 tablet 0    CLINPRO 5000 1.1 % paste BRUSH TEETH FOR 1 MINUTE TWICE A DAY DO NOT RINSE, EAT OR DRINK FOR 30 MINS AFTER      escitalopram (LEXAPRO) 10 mg tablet Take 1 tablet (10 mg total) by mouth daily. 30 tablet 1    fluticasone propionate (FLONASE) 50 mcg/actuation nasal spray Administer 1 spray into each nostril daily. 16 g 1    insulin glargine U-100 (LANTUS) 100 unit/mL injection Pt states she's on 42 units      levothyroxine (SYNTHROID) 75 mcg tablet 1 TABLET ON AN EMPTY STOMACH IN THE MORNING  3    montelukast (SINGULAIR) 10 mg tablet TAKE 1 TABLET BY MOUTH EVERY DAY AT NIGHT 30 tablet 5    NovoLIN N NPH U-100 Insulin 100 unit/mL injection INJECT 40 UNITS SUBCUTANEOUS DAILY AT BEDTIME      zolpidem (AMBIEN) 5 mg tablet Take 1 tablet (5 mg total) by mouth nightly. TAKE 1 TABLET BY MOUTH EVERY DAY AT BEDTIME AS NEEDED FOR SLEEP 30 tablet 1    azelastine 0.15 % (205.5 mcg) nasal spray ADMINISTER 1 SPRAY INTO EACH NOSTRIL 2 TIMES A DAY AS NEEDED FOR RHINITIS. 30 mL 4    BD ULTRA-FINE SHORT PEN NEEDLE 31 gauge x 5/16\" needle 1 EACH DAILY. BD ULTRA FINE      blood-glucose sensor (DEXCOM G6 SENSOR) device device 1 " each by Other route.      blood-glucose transmitter (DEXCOM G6 TRANSMITTER) device USE ONE TRANSMITTER WITH /SENSORS AND REPLACE EVERY 3 MONTHS      buPROPion XL (WELLBUTRIN XL) 150 mg 24 hr tablet TAKE 1 TABLET BY MOUTH EVERY DAY 30 tablet 5    insulin aspart (NovoLOG) 100 unit/mL patient supplied pump Inject under the skin continuously.      LANTUS SOLOSTAR U-100 INSULIN 100 unit/mL (3 mL) pen INJECT 44 UNITS UNDER THE SKIN NIGHTLY.      loratadine (CLARITIN) 10 mg tablet TAKE 1 TABLET BY MOUTH EVERY DAY 30 tablet 2     No current facility-administered medications for this visit.       Physical Exam  Vitals reviewed.   Constitutional:       Appearance: Normal appearance. She is normal weight.   HENT:      Right Ear: Tympanic membrane, ear canal and external ear normal.      Left Ear: Tympanic membrane, ear canal and external ear normal.      Nose: Congestion and rhinorrhea present.      Mouth/Throat:      Mouth: Mucous membranes are moist.      Pharynx: Oropharynx is clear.      Comments: PND  Cardiovascular:      Rate and Rhythm: Normal rate and regular rhythm.      Pulses: Normal pulses.      Heart sounds: Normal heart sounds.   Pulmonary:      Effort: Pulmonary effort is normal.      Breath sounds: Normal breath sounds.   Musculoskeletal:      Cervical back: Neck supple.   Lymphadenopathy:      Cervical: No cervical adenopathy.   Skin:     General: Skin is warm and dry.   Neurological:      Mental Status: She is alert and oriented to person, place, and time. Mental status is at baseline.   Psychiatric:         Mood and Affect: Mood normal.         Behavior: Behavior normal.         Thought Content: Thought content normal.         Assessment/Plan     Diagnoses and all orders for this visit:    Upper respiratory tract infection, unspecified type (Primary)  Assessment & Plan:  Increase fluids   Rest   Tylenol or Ibuprofen as needed for body aches/fevers   Finish antibiotic as prescribed-  azithromycin  Nasal saline for congestion as needed   Contact us if you are no better or worse           Other orders  -     azithromycin (ZITHROMAX) 250 mg tablet; Take 2 tablets the first day, then 1 tablet daily for 4 days.  -     fluticasone propionate (FLONASE) 50 mcg/actuation nasal spray; Administer 1 spray into each nostril daily.         CLINTON Spencer   9/12/2023

## 2023-09-12 NOTE — ASSESSMENT & PLAN NOTE
Increase fluids   Rest   Tylenol or Ibuprofen as needed for body aches/fevers   Finish antibiotic as prescribed- azithromycin  Nasal saline for congestion as needed   Contact us if you are no better or worse

## 2023-09-12 NOTE — PATIENT INSTRUCTIONS
Thank you for allowing me to participate in your care, it was a pleasure to care for you today. Should any questions or concerns arise, please call or message me on My Chart.     Increase fluids   Rest   Tylenol or Ibuprofen as needed for body aches/fevers   Finish antibiotic as prescribed- azithromycin  Nasal saline for congestion as needed   Contact us if you are no better or worse

## 2023-09-19 ENCOUNTER — TELEPHONE (OUTPATIENT)
Dept: PRIMARY CARE | Facility: CLINIC | Age: 53
End: 2023-09-19

## 2023-09-19 NOTE — TELEPHONE ENCOUNTER
Please advise pt called in stating she believes that her ear infection is back and may need a different medication. Please can someone reach out and advise?

## 2023-09-19 NOTE — TELEPHONE ENCOUNTER
Sw pt, pt reports still having an ear infection; reports ear pain, swollen glands and on/off sore throat; pt states that she felt better with the zpack prescribed for 2 days, then sx returned 2 days ago; pt scheduled for an appt tomorrow at 4:30

## 2023-09-20 ENCOUNTER — OFFICE VISIT (OUTPATIENT)
Dept: PRIMARY CARE | Facility: CLINIC | Age: 53
End: 2023-09-20
Payer: COMMERCIAL

## 2023-09-20 VITALS
HEART RATE: 93 BPM | HEIGHT: 63 IN | WEIGHT: 183 LBS | SYSTOLIC BLOOD PRESSURE: 140 MMHG | TEMPERATURE: 97.9 F | RESPIRATION RATE: 16 BRPM | BODY MASS INDEX: 32.43 KG/M2 | OXYGEN SATURATION: 98 % | DIASTOLIC BLOOD PRESSURE: 78 MMHG

## 2023-09-20 DIAGNOSIS — I21.4 NSTEMI (NON-ST ELEVATED MYOCARDIAL INFARCTION) (CMS/HCC): ICD-10-CM

## 2023-09-20 DIAGNOSIS — F32.1 CURRENT MODERATE EPISODE OF MAJOR DEPRESSIVE DISORDER WITHOUT PRIOR EPISODE (CMS/HCC): ICD-10-CM

## 2023-09-20 DIAGNOSIS — J40 BRONCHITIS: Primary | ICD-10-CM

## 2023-09-20 DIAGNOSIS — E10.3553 STABLE PROLIFERATIVE DIABETIC RETINOPATHY OF BOTH EYES ASSOCIATED WITH TYPE 1 DIABETES MELLITUS (CMS/HCC): ICD-10-CM

## 2023-09-20 DIAGNOSIS — E10.65 TYPE 1 DIABETES MELLITUS WITH HYPERGLYCEMIA (CMS/HCC): ICD-10-CM

## 2023-09-20 PROCEDURE — 99214 OFFICE O/P EST MOD 30 MIN: CPT | Performed by: FAMILY MEDICINE

## 2023-09-20 PROCEDURE — 3008F BODY MASS INDEX DOCD: CPT | Performed by: FAMILY MEDICINE

## 2023-09-20 RX ORDER — GUAIFENESIN AND DEXTROMETHORPHAN HYDROBROMIDE 600; 30 MG/1; MG/1
1 TABLET, EXTENDED RELEASE ORAL EVERY 12 HOURS
Qty: 14 EACH | Refills: 0 | Status: SHIPPED | OUTPATIENT
Start: 2023-09-20

## 2023-09-20 RX ORDER — MOMETASONE FUROATE MONOHYDRATE 50 UG/1
2 SPRAY, METERED NASAL DAILY
Qty: 17 G | Refills: 1 | Status: SHIPPED | OUTPATIENT
Start: 2023-09-20 | End: 2024-04-12

## 2023-09-20 NOTE — PROGRESS NOTES
"Subjective      Patient ID: Annette Montes De Oca is a 53 y.o. female.      HPI    The following have been reviewed and updated as appropriate in this visit:           Sore throat off and off   Cough   Left ear full pain  Flonase monetlukast  Not taking cold medication     Did take zpack just completed   No fever      saw endo yesterday hga1c 9.3     He increased insulin to 40 units   1:6 novolog          Review of Systems      Current Outpatient Medications:     atorvastatin (LIPITOR) 20 mg tablet, Take 1 tablet (20 mg total) by mouth daily., Disp: 90 tablet, Rfl: 3    azelastine 0.15 % (205.5 mcg) nasal spray, ADMINISTER 1 SPRAY INTO EACH NOSTRIL 2 TIMES A DAY AS NEEDED FOR RHINITIS., Disp: 30 mL, Rfl: 4    BD ULTRA-FINE SHORT PEN NEEDLE 31 gauge x 5/16\" needle, 1 EACH DAILY. BD ULTRA FINE, Disp: , Rfl:     blood-glucose sensor (DEXCOM G6 SENSOR) device device, 1 each by Other route., Disp: , Rfl:     blood-glucose transmitter (DEXCOM G6 TRANSMITTER) device, USE ONE TRANSMITTER WITH /SENSORS AND REPLACE EVERY 3 MONTHS, Disp: , Rfl:     buPROPion XL (WELLBUTRIN XL) 150 mg 24 hr tablet, TAKE 1 TABLET BY MOUTH EVERY DAY, Disp: 30 tablet, Rfl: 5    CLINPRO 5000 1.1 % paste, BRUSH TEETH FOR 1 MINUTE TWICE A DAY DO NOT RINSE, EAT OR DRINK FOR 30 MINS AFTER, Disp: , Rfl:     escitalopram (LEXAPRO) 10 mg tablet, Take 1 tablet (10 mg total) by mouth daily., Disp: 30 tablet, Rfl: 1    fluticasone propionate (FLONASE) 50 mcg/actuation nasal spray, Administer 1 spray into each nostril daily., Disp: 16 g, Rfl: 1    insulin aspart (NovoLOG) 100 unit/mL patient supplied pump, Inject under the skin continuously., Disp: , Rfl:     insulin glargine U-100 (LANTUS) 100 unit/mL injection, Pt states she's on 42 units, Disp: , Rfl:     LANTUS SOLOSTAR U-100 INSULIN 100 unit/mL (3 mL) pen, INJECT 44 UNITS UNDER THE SKIN NIGHTLY., Disp: , Rfl:     levothyroxine (SYNTHROID) 75 mcg tablet, 1 TABLET ON AN EMPTY STOMACH IN THE " MORNING, Disp: , Rfl: 3    loratadine (CLARITIN) 10 mg tablet, TAKE 1 TABLET BY MOUTH EVERY DAY, Disp: 30 tablet, Rfl: 2    montelukast (SINGULAIR) 10 mg tablet, TAKE 1 TABLET BY MOUTH EVERY DAY AT NIGHT, Disp: 30 tablet, Rfl: 5    NovoLIN N NPH U-100 Insulin 100 unit/mL injection, INJECT 40 UNITS SUBCUTANEOUS DAILY AT BEDTIME, Disp: , Rfl:     zolpidem (AMBIEN) 5 mg tablet, Take 1 tablet (5 mg total) by mouth nightly. TAKE 1 TABLET BY MOUTH EVERY DAY AT BEDTIME AS NEEDED FOR SLEEP, Disp: 30 tablet, Rfl: 1  Codeine, Cortisone, and Penicillins  Past Medical History:   Diagnosis Date    Acute deep vein thrombosis (DVT) of brachial vein of right upper extremity (CMS/HCC) 8/28/2022    Allergic     Coronary artery disease     Diabetes mellitus type I (CMS/HCC)     Disease of thyroid gland     Grand mal seizure (CMS/HCC)      Past Surgical History:   Procedure Laterality Date    CARPAL TUNNEL RELEASE      FOOT SURGERY      HERNIA REPAIR      MOUTH SURGERY      OOPHORECTOMY      SINUS SURGERY      WISDOM TOOTH EXTRACTION       Objective     There were no vitals filed for this visit.    There is no height or weight on file to calculate BMI.    Physical Exam  Constitutional:       Appearance: She is well-developed. She is obese.   HENT:      Head: Normocephalic and atraumatic.      Right Ear: Tympanic membrane, ear canal and external ear normal.      Left Ear: Tympanic membrane, ear canal and external ear normal.      Mouth/Throat:      Pharynx: Posterior oropharyngeal erythema present. No oropharyngeal exudate.   Eyes:      Conjunctiva/sclera: Conjunctivae normal.   Neck:      Thyroid: No thyromegaly.   Cardiovascular:      Rate and Rhythm: Normal rate and regular rhythm.      Heart sounds: Normal heart sounds. No murmur heard.     No friction rub. No gallop.   Pulmonary:      Effort: Pulmonary effort is normal. No respiratory distress.      Breath sounds: Normal breath sounds. No wheezing or rales.    Musculoskeletal:      Cervical back: Normal range of motion and neck supple.   Lymphadenopathy:      Cervical: No cervical adenopathy.   Neurological:      Mental Status: She is alert and oriented to person, place, and time.   Psychiatric:         Behavior: Behavior normal.         Thought Content: Thought content normal.         Judgment: Judgment normal.         Assessment/Plan   Problem List Items Addressed This Visit    None    Disc sx may be viral hence not clear response to abx ( just completed zpack or may be just slow to clear )  rec brief course few days of  mucinex dm only 600 /30 q 12 not higher dose explained interaction with mood med is possible     Call if sx not improving in next 3-5 days     Disc diabetic control using insulin monitoring bG    Finally disc mood is better doing better on medications   There are no Patient Instructions on file for this visit.    Ruba Beavers MD

## 2023-09-25 DIAGNOSIS — F41.9 ANXIETY: Primary | ICD-10-CM

## 2023-09-25 RX ORDER — ESCITALOPRAM OXALATE 10 MG/1
10 TABLET ORAL DAILY
Qty: 90 TABLET | Refills: 1 | Status: SHIPPED | OUTPATIENT
Start: 2023-09-25 | End: 2024-03-18

## 2023-09-26 ENCOUNTER — TELEPHONE (OUTPATIENT)
Dept: PRIMARY CARE | Facility: CLINIC | Age: 53
End: 2023-09-26
Payer: COMMERCIAL

## 2023-09-26 NOTE — TELEPHONE ENCOUNTER
Pt called stating that she was seen twice for borderline bronchitis. Pt just wanted to update PCP that while her throat is better, Pt still has ear pain. Pt would like advise as to what she should do next for care. Please advise.

## 2023-09-27 RX ORDER — CEFDINIR 300 MG/1
300 CAPSULE ORAL 2 TIMES DAILY
Qty: 14 CAPSULE | Refills: 0 | Status: SHIPPED | OUTPATIENT
Start: 2023-09-27 | End: 2023-10-04

## 2023-09-27 NOTE — TELEPHONE ENCOUNTER
Patient called back in returning Dr. Beavers call to further discuss. Best call back number [phone]

## 2023-09-27 NOTE — TELEPHONE ENCOUNTER
I tried to reach pt on home number no answer   Also cell disconnected  ( does she have a new number to add?)       Please try to reach pt  -   if just having ear pressure and not really pain  this can takes weeks to clear after sinus infection  -  Do not take antibiotic and give it time      But if she is having ear pain then I rec another round of antibiotics     I am sending rx to pharmacy   For omnicef

## 2023-09-27 NOTE — TELEPHONE ENCOUNTER
Sw pt, pt states that it's both pain and pressure; advised pt that a script has been sent over to pharmacy for the pain; advised pt to keep us updated on symptoms with this new round of abx

## 2023-10-05 ENCOUNTER — TELEPHONE (OUTPATIENT)
Dept: CARDIOLOGY | Facility: CLINIC | Age: 53
End: 2023-10-05

## 2023-10-05 NOTE — TELEPHONE ENCOUNTER
----- Message -----  From: Diamante Johnson MD  Sent: 12/2/2022   4:16 PM EST  To: Bee Brown, Timur Licona RN  Subject: Appointment/stress echo/lipids                   She needs an appointment with me in a year with a stress echo and lipids done prior.  She has an elevated coronary artery calcium score    Called pt to schedule 1 yr follow up for December, with testing prior.    When pt calls back, please schedule OV in December, with SE here at [facility] 1-2 weeks prior.

## 2023-10-08 DIAGNOSIS — E10.65 TYPE 1 DIABETES MELLITUS WITH HYPERGLYCEMIA (CMS/HCC): ICD-10-CM

## 2023-10-09 RX ORDER — ATORVASTATIN CALCIUM 20 MG/1
20 TABLET, FILM COATED ORAL DAILY
Qty: 90 TABLET | Refills: 0 | Status: SHIPPED | OUTPATIENT
Start: 2023-10-09 | End: 2023-12-26 | Stop reason: SDUPTHER

## 2023-10-26 ENCOUNTER — TELEPHONE (OUTPATIENT)
Dept: PRIMARY CARE | Facility: CLINIC | Age: 53
End: 2023-10-26
Payer: COMMERCIAL

## 2023-10-26 NOTE — TELEPHONE ENCOUNTER
"Pt called stating that she was seen at [facility] last Thursday for a UTI and vomiting, she was prescribed an antibiotic. Pt states the antibiotic has not been helping, her urine still has a \"weird\" smell and she has urinary frequency. Pt states that she had thrown up today about 15 times and she has had nothing to eat today and only ate crackers yesterday. Pt had about 16 oz of Propel today and a couple ounces of pepsi around 3p. I advised pt that she will need to go back to the ER tonight since she is not able to stop vomiting. Pt is in agreement with going back to the ER.  "

## 2023-10-27 ENCOUNTER — TELEPHONE (OUTPATIENT)
Dept: PRIMARY CARE | Facility: CLINIC | Age: 53
End: 2023-10-27

## 2023-10-27 RX ORDER — ZOLPIDEM TARTRATE 5 MG/1
TABLET ORAL
Qty: 30 TABLET | Refills: 1 | Status: SHIPPED | OUTPATIENT
Start: 2023-10-27 | End: 2024-01-03

## 2023-10-27 NOTE — TELEPHONE ENCOUNTER
Spoke with MA, and found that Pt did cancel appt through [facility]. Pt also picked up medication prescribed by ER according to ER encounter note. Please call pt back at the earliest convenience.

## 2023-10-27 NOTE — TELEPHONE ENCOUNTER
"Pt came in office for appointment with PCP at 3 pm. While looking in chart, Pt's appointment stated \"cancelled through [facility]\" Pt stated that she did not cancel appointment. PT has been in the ER twice this week for constant urination and more. Spoke with MA and was told to schedule pt for next available and can be a telemed. Pt has been scheduled.    Pt stated that she needs a new antibiotic, her current one is not working. Pt needs a new one asap as symptoms are worsening. Please advise  "

## 2023-10-31 ENCOUNTER — TELEMEDICINE (OUTPATIENT)
Dept: PRIMARY CARE | Facility: CLINIC | Age: 53
End: 2023-10-31
Payer: COMMERCIAL

## 2023-10-31 DIAGNOSIS — E10.65 TYPE 1 DIABETES MELLITUS WITH HYPERGLYCEMIA (CMS/HCC): ICD-10-CM

## 2023-10-31 DIAGNOSIS — F32.1 CURRENT MODERATE EPISODE OF MAJOR DEPRESSIVE DISORDER WITHOUT PRIOR EPISODE (CMS/HCC): ICD-10-CM

## 2023-10-31 DIAGNOSIS — E10.3553 STABLE PROLIFERATIVE DIABETIC RETINOPATHY OF BOTH EYES ASSOCIATED WITH TYPE 1 DIABETES MELLITUS (CMS/HCC): ICD-10-CM

## 2023-10-31 DIAGNOSIS — I21.4 NSTEMI (NON-ST ELEVATED MYOCARDIAL INFARCTION) (CMS/HCC): ICD-10-CM

## 2023-10-31 DIAGNOSIS — R30.0 DYSURIA: Primary | ICD-10-CM

## 2023-10-31 PROBLEM — N30.00 ACUTE CYSTITIS WITHOUT HEMATURIA: Status: ACTIVE | Noted: 2023-10-31

## 2023-10-31 PROCEDURE — 99214 OFFICE O/P EST MOD 30 MIN: CPT | Mod: 95 | Performed by: FAMILY MEDICINE

## 2023-10-31 NOTE — TELEPHONE ENCOUNTER
Attempted to triage pt for mychart visit; unable to, phone just rang and there was no voicemail that picked up to leave a message

## 2023-10-31 NOTE — PROGRESS NOTES
Verification of Patient Location:  The patient affirms they are currently located in the following state: [place]    Request for Consent:    Audio and Video Encounter   Kai, my name is Ruba Beavers MD.  Before we proceed, can you please verify your identification by telling me your full name and date of birth?  Can you tell me who is in the room with you?    You and I are about to have a telemedicine check-in or visit because you have requested it.  This is a live video-conference.  I am a real person, speaking to you in real time.  There is no one else with me on the video-conference. I am not recording this conversation and I am asking you not to record it.  This telemedicine visit will be billed to your health insurance or you, if you are self-insured.  You understand you will be responsible for any copayments or coinsurances that apply to your telemedicine visit.  Communication platform used for this encounter:  ActualSun Video Visit (Epic Video Client)       Before starting our telemedicine visit, I am required to get your consent for this virtual check-in or visit by telemedicine. Do you consent?      Patient Response to Request for Consent:  Yes      Visit Documentation:  Subjective     Patient ID: Annette Montes De Oca is a 53 y.o. female.  1970      HPI    The following have been reviewed and updated as appropriate in this visit:   Tobacco  Allergies  Meds  Problems  Med Hx  Surg Hx  Fam Hx  Soc   Hx         Reviewed 2 er records     First tooth pain was on abd for dental infection presented wit accelerated h[facility] and uti rx bactrim     Then back to ER with nausea vomiting  rx keflex     No cultures done by ER     No urine culture done in ER 10/26 or 10/20   Reviewed UA   ua 10/26+ small leuk no nitrites  Keflex but has nto taken rx   ua 10/20 + mod leuk no nit + bllood ++ glucose bactrim    Current sx   Not hungry but no nvd no abd pain   Occasional burning with urine sometimes pressure    She did not start second abx keflex because of pcn allergy but clarified pcn allergy for her has been GI upset   And the pharmacist told her there was 2% pcn in keflex -  Told her no that 2% chance people with true allergy to pcn will be allergic to keflex and hers is not a true allergy rather a side effects GI upset     So it is ok to take the keflex and if she gets GI upset let me know       Lab Results   Component Value Date    WBC 5.6 10/06/2017    HGB 14.3 10/06/2017    HCT 41.9 10/06/2017     (H) 10/06/2017    CHOL 138 03/17/2023    TRIG 68 03/17/2023    HDL 55 03/17/2023    ALT 11 03/17/2023    AST 14 03/17/2023     03/17/2023    K 4.1 03/17/2023     03/17/2023    CREATININE 1.14 (H) 03/17/2023    BUN 12 03/17/2023    CO2 19 (L) 03/17/2023    TSH 1.810 07/08/2022    HGBA1C 9.3 (H) 09/15/2023    MICROALBUR 3.6 07/08/2022    LDLCALC 69 03/17/2023             Review of Systems      Assessment/Plan   Diagnoses and all orders for this visit:    Dysuria (Primary)    Current moderate episode of major depressive disorder without prior episode (CMS/[facility])    NSTEMI (non-ST elevated myocardial infarction) (CMS/[facility])    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 1 diabetes mellitus (CMS/[facility])    Type 1 diabetes mellitus with hyperglycemia (CMS/[facility])    as above need urine culture pt will drop off at lab today and then start keflex has abx   If she get side effects let me know     Disc need to monitor BG closely with uti     Disc BG coverage   She did see her endo in September     No cp   Did have troponin and EKG in ER and ruled out DKA           Time Spent:  I spent 40 minutes on this date of service performing the following activities: obtaining history, entering orders, documenting, preparing for visit, obtaining / reviewing records, providing counseling and education, communicating results and coordinating care.

## 2023-11-05 DIAGNOSIS — J30.89 OTHER ALLERGIC RHINITIS: ICD-10-CM

## 2023-11-06 RX ORDER — MONTELUKAST SODIUM 10 MG/1
TABLET ORAL
Qty: 30 TABLET | Refills: 5 | Status: SHIPPED | OUTPATIENT
Start: 2023-11-06 | End: 2024-04-16

## 2023-11-06 RX ORDER — LORATADINE 10 MG/1
TABLET ORAL
Qty: 30 TABLET | Refills: 2 | Status: SHIPPED | OUTPATIENT
Start: 2023-11-06 | End: 2024-01-11

## 2023-11-17 LAB — MLHC DIABETIC EYE EXAM (EXTERNAL): NORMAL

## 2023-11-28 ENCOUNTER — TELEPHONE (OUTPATIENT)
Dept: SCHEDULING | Facility: CLINIC | Age: 53
End: 2023-11-28
Payer: COMMERCIAL

## 2023-11-28 NOTE — TELEPHONE ENCOUNTER
----- Message from Kika Giron sent at 4/18/2023 10:29 AM CDT -----  Contact: Patient  Type:  Same Day Appointment Request    Caller is requesting a same day appointment.  Caller declined first available appointment listed below.      Name of Caller:Patient    When is the first available appointment?N/A    Best Call Back Number:[phone]    Additional Information: States she missed her appointment yesterday - thought it was today and she is off of work for the appointment - would like to come in if possible - please advise - thank you         Spoke with patient. Patient aware Dr. Johnson is out of the office, and will return Thurs. 11/30/23. Also, patient aware PSR will contact her to r/s.

## 2023-11-28 NOTE — TELEPHONE ENCOUNTER
Pt calling to let Dr Johnson know that she will have to schedule the stress test later. Pt has to get her insulin levels in order.

## 2023-12-15 ENCOUNTER — OFFICE VISIT (OUTPATIENT)
Dept: PRIMARY CARE | Facility: CLINIC | Age: 53
End: 2023-12-15
Payer: COMMERCIAL

## 2023-12-15 VITALS
TEMPERATURE: 97.9 F | HEART RATE: 65 BPM | SYSTOLIC BLOOD PRESSURE: 132 MMHG | RESPIRATION RATE: 16 BRPM | BODY MASS INDEX: 32.43 KG/M2 | OXYGEN SATURATION: 99 % | HEIGHT: 63 IN | WEIGHT: 183 LBS | DIASTOLIC BLOOD PRESSURE: 80 MMHG

## 2023-12-15 DIAGNOSIS — F32.1 CURRENT MODERATE EPISODE OF MAJOR DEPRESSIVE DISORDER WITHOUT PRIOR EPISODE (CMS/HCC): ICD-10-CM

## 2023-12-15 DIAGNOSIS — E10.65 TYPE 1 DIABETES MELLITUS WITH HYPERGLYCEMIA (CMS/HCC): ICD-10-CM

## 2023-12-15 DIAGNOSIS — M65.322 TRIGGER FINGER, LEFT INDEX FINGER: Primary | ICD-10-CM

## 2023-12-15 DIAGNOSIS — I21.4 NSTEMI (NON-ST ELEVATED MYOCARDIAL INFARCTION) (CMS/HCC): ICD-10-CM

## 2023-12-15 PROCEDURE — 99213 OFFICE O/P EST LOW 20 MIN: CPT | Performed by: FAMILY MEDICINE

## 2023-12-15 PROCEDURE — 3008F BODY MASS INDEX DOCD: CPT | Performed by: FAMILY MEDICINE

## 2023-12-15 NOTE — PROGRESS NOTES
[facility]   [address] [place] [address], [address]  [facility] [place] [address]  Phone: [phone]  Fax:[phone]        Subjective      Patient ID: Annette Montes De Oca is a 53 y.o. female.  1970      HPI    The following have been reviewed and updated as appropriate in this visit:        Review of Systems  Patient Active Problem List   Diagnosis   • Anxiety   • Hypothyroid   • Iron deficiency anemia   • Paronychia of toe of left foot   • Seasonal allergies   • Type 1 diabetes mellitus with hyperglycemia (CMS/HCC)   • 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 1 diabetes mellitus (CMS/HCC)   • NSTEMI (non-ST elevated myocardial infarction) (CMS/HCC)   • Cataract of right eye   • Family history of cerebrovascular accident (CVA)   • Pure hypercholesterolemia   • Left acute otitis media   • Current moderate episode of major depressive disorder without prior episode (CMS/HCC)   • Upper respiratory tract infection   • Urinary frequency   • Acute cystitis without hematuria      Past Medical History:   Diagnosis Date   • Acute deep vein thrombosis (DVT) of brachial vein of right upper extremity (CMS/HCC) 8/28/2022   • Allergic    • Coronary artery disease    • Diabetes mellitus type I (CMS/HCC)    • Disease of thyroid gland    • Grand mal seizure (CMS/HCC)      Past Surgical History:   Procedure Laterality Date   • CARPAL TUNNEL RELEASE     • FOOT SURGERY     • HERNIA REPAIR     • MOUTH SURGERY     • OOPHORECTOMY     • SINUS SURGERY     • WISDOM TOOTH EXTRACTION       Social History     Socioeconomic History   • Marital status:    Tobacco Use   • Smoking status: Never   • Smokeless tobacco: Never   Vaping Use   • Vaping Use: Never used   Substance and Sexual Activity   • Alcohol use: No   • Drug use: No   • Sexual activity: Defer   Social History Narrative    [employer]     Objective     There were no vitals filed for this visit.  There is no height or  "weight on file to calculate BMI.  Current Outpatient Medications   Medication Sig Dispense Refill   • loratadine (CLARITIN) 10 mg tablet TAKE 1 TABLET BY MOUTH EVERY DAY 30 tablet 2   • montelukast (SINGULAIR) 10 mg tablet TAKE 1 TABLET BY MOUTH EVERY DAY AT NIGHT 30 tablet 5   • atorvastatin (LIPITOR) 20 mg tablet Take 1 tablet (20 mg total) by mouth daily. Additional refills require an in office evaluation. Please call [phone] to schedule 90 tablet 0   • azelastine 0.15 % (205.5 mcg) nasal spray ADMINISTER 1 SPRAY INTO EACH NOSTRIL 2 TIMES A DAY AS NEEDED FOR RHINITIS. 30 mL 4   • BD ULTRA-FINE SHORT PEN NEEDLE 31 gauge x 5/16\" needle 1 EACH DAILY. BD ULTRA FINE     • blood-glucose sensor (DEXCOM G6 SENSOR) device device 1 each by Other route.     • blood-glucose transmitter (DEXCOM G6 TRANSMITTER) device USE ONE TRANSMITTER WITH /SENSORS AND REPLACE EVERY 3 MONTHS     • buPROPion XL (WELLBUTRIN XL) 150 mg 24 hr tablet TAKE 1 TABLET BY MOUTH EVERY DAY 30 tablet 5   • CLINPRO 5000 1.1 % paste BRUSH TEETH FOR 1 MINUTE TWICE A DAY DO NOT RINSE, EAT OR DRINK FOR 30 MINS AFTER     • dextromethorphan-guaifenesin (MUCINEX DM)  mg tablet extended release 12 hr Take 1 tablet by mouth every 12 (twelve) hours. Prn cough congestion ( one 1 tab q 12 hrs) 14 each 0   • escitalopram (LEXAPRO) 10 mg tablet TAKE 1 TABLET BY MOUTH EVERY DAY 90 tablet 1   • fluticasone propionate (FLONASE) 50 mcg/actuation nasal spray Administer 1 spray into each nostril daily. 16 g 1   • insulin aspart (NovoLOG) 100 unit/mL patient supplied pump Inject under the skin continuously.     • insulin glargine U-100 (LANTUS) 100 unit/mL injection Pt states she's on 42 units     • LANTUS SOLOSTAR U-100 INSULIN 100 unit/mL (3 mL) pen INJECT 44 UNITS UNDER THE SKIN NIGHTLY.     • levothyroxine (SYNTHROID) 75 mcg tablet 1 TABLET ON AN EMPTY STOMACH IN THE MORNING  3   • mometasone (NASONEX) 50 mcg/actuation nasal spray Administer 2 sprays " into each nostril daily. 17 g 1   • NovoLIN N NPH U-100 Insulin 100 unit/mL injection INJECT 40 UNITS SUBCUTANEOUS DAILY AT BEDTIME     • zolpidem (AMBIEN) 5 mg tablet TAKE 1 TABLET BY MOUTH NIGHTLY EVERY DAY AT BEDTIME AS NEEDED FOR SLEEP 30 tablet 1     No current facility-administered medications for this visit.       Physical Exam    Assessment/Plan     {There are no diagnoses linked to this encounter. (Refresh or delete this SmartLink)}       CLINTON Spencer   12/15/2023

## 2023-12-15 NOTE — PROGRESS NOTES
"Subjective      Patient ID: Annette Montes De Oca is a 53 y.o. female.      HPI    The following have been reviewed and updated as appropriate in this visit:       Tobacco  Allergies  Meds  Problems  Med Hx  Surg Hx  Fam Hx       Pt with diabetes CAD depression higher chol presents for acute visit     Trigger finger left index cannot bend it   Works retail folding clothes    Has gotten caught before but has been able to straighten it out   No particular trauma         Endo will be starting pump on Tuesday !  And will get labs end of the month for endo     Diabetic eye  Had last month ok Dr Augie Figueroa [facility]     Already got covid and flu vaccine        Lab Results   Component Value Date    WBC 5.6 10/06/2017    HGB 14.3 10/06/2017    HCT 41.9 10/06/2017     (H) 10/06/2017    CHOL 138 03/17/2023    TRIG 68 03/17/2023    HDL 55 03/17/2023    ALT 11 03/17/2023    AST 14 03/17/2023     03/17/2023    K 4.1 03/17/2023     03/17/2023    CREATININE 1.14 (H) 03/17/2023    BUN 12 03/17/2023    CO2 19 (L) 03/17/2023    TSH 1.810 07/08/2022    HGBA1C 9.3 (H) 09/15/2023    MICROALBUR 3.6 07/08/2022    LDLCALC 69 03/17/2023       Review of Systems      Current Outpatient Medications:   •  atorvastatin (LIPITOR) 20 mg tablet, Take 1 tablet (20 mg total) by mouth daily. Additional refills require an in office evaluation. Please call [phone] to schedule, Disp: 90 tablet, Rfl: 0  •  azelastine 0.15 % (205.5 mcg) nasal spray, ADMINISTER 1 SPRAY INTO EACH NOSTRIL 2 TIMES A DAY AS NEEDED FOR RHINITIS., Disp: 30 mL, Rfl: 4  •  BD ULTRA-FINE SHORT PEN NEEDLE 31 gauge x 5/16\" needle, 1 EACH DAILY. BD ULTRA FINE, Disp: , Rfl:   •  blood-glucose sensor (DEXCOM G6 SENSOR) device device, 1 each by Other route., Disp: , Rfl:   •  blood-glucose transmitter (DEXCOM G6 TRANSMITTER) device, USE ONE TRANSMITTER WITH /SENSORS AND REPLACE EVERY 3 MONTHS, Disp: , Rfl:   •  buPROPion XL (WELLBUTRIN XL) 150 mg 24 hr " tablet, TAKE 1 TABLET BY MOUTH EVERY DAY, Disp: 30 tablet, Rfl: 5  •  CLINPRO 5000 1.1 % paste, BRUSH TEETH FOR 1 MINUTE TWICE A DAY DO NOT RINSE, EAT OR DRINK FOR 30 MINS AFTER, Disp: , Rfl:   •  dextromethorphan-guaifenesin (MUCINEX DM)  mg tablet extended release 12 hr, Take 1 tablet by mouth every 12 (twelve) hours. Prn cough congestion ( one 1 tab q 12 hrs), Disp: 14 each, Rfl: 0  •  escitalopram (LEXAPRO) 10 mg tablet, TAKE 1 TABLET BY MOUTH EVERY DAY, Disp: 90 tablet, Rfl: 1  •  fluticasone propionate (FLONASE) 50 mcg/actuation nasal spray, Administer 1 spray into each nostril daily., Disp: 16 g, Rfl: 1  •  insulin aspart (NovoLOG) 100 unit/mL patient supplied pump, Inject under the skin continuously., Disp: , Rfl:   •  insulin glargine U-100 (LANTUS) 100 unit/mL injection, Pt states she's on 42 units, Disp: , Rfl:   •  LANTUS SOLOSTAR U-100 INSULIN 100 unit/mL (3 mL) pen, INJECT 44 UNITS UNDER THE SKIN NIGHTLY., Disp: , Rfl:   •  levothyroxine (SYNTHROID) 75 mcg tablet, 1 TABLET ON AN EMPTY STOMACH IN THE MORNING, Disp: , Rfl: 3  •  loratadine (CLARITIN) 10 mg tablet, TAKE 1 TABLET BY MOUTH EVERY DAY, Disp: 30 tablet, Rfl: 2  •  montelukast (SINGULAIR) 10 mg tablet, TAKE 1 TABLET BY MOUTH EVERY DAY AT NIGHT, Disp: 30 tablet, Rfl: 5  •  NovoLIN N NPH U-100 Insulin 100 unit/mL injection, INJECT 40 UNITS SUBCUTANEOUS DAILY AT BEDTIME, Disp: , Rfl:   •  zolpidem (AMBIEN) 5 mg tablet, TAKE 1 TABLET BY MOUTH NIGHTLY EVERY DAY AT BEDTIME AS NEEDED FOR SLEEP, Disp: 30 tablet, Rfl: 1  •  mometasone (NASONEX) 50 mcg/actuation nasal spray, Administer 2 sprays into each nostril daily., Disp: 17 g, Rfl: 1  Codeine, Cortisone, and Penicillins  Past Medical History:   Diagnosis Date   • Acute deep vein thrombosis (DVT) of brachial vein of right upper extremity (CMS/HCC) 8/28/2022   • Allergic    • Coronary artery disease    • Diabetes mellitus type I (CMS/HCC)    • Disease of thyroid gland    • Grand mal seizure  "(CMS/[facility])      Past Surgical History:   Procedure Laterality Date   • CARPAL TUNNEL RELEASE     • FOOT SURGERY     • HERNIA REPAIR     • MOUTH SURGERY     • OOPHORECTOMY     • SINUS SURGERY     • WISDOM TOOTH EXTRACTION       Objective     Vitals:    12/15/23 1136   BP: 132/80   BP Location: Left upper arm   Patient Position: Sitting   Pulse: 65   Resp: 16   Temp: 36.6 °C (97.9 °F)   TempSrc: Temporal   SpO2: 99%   Weight: 83 kg (183 lb)   Height: 1.6 m (5' 3\")       Body mass index is 32.42 kg/m².    Physical Exam  Constitutional:       Appearance: She is obese.   HENT:      Head: Normocephalic and atraumatic.   Cardiovascular:      Rate and Rhythm: Regular rhythm.   Musculoskeletal:      Comments: Left hand index finger pip flexed unable to straighten   Not painful no redness     Neurological:      Mental Status: She is alert.   Psychiatric:         Mood and Affect: Mood normal.         Behavior: Behavior normal.         Thought Content: Thought content normal.         Judgment: Judgment normal.         Assessment/Plan   Problem List Items Addressed This Visit        Circulatory    NSTEMI (non-ST elevated myocardial infarction) (CMS/[facility])       Endocrine/Metabolic    Type 1 diabetes mellitus with hyperglycemia (CMS/[facility])    Relevant Orders    Microalbumin / creatinine urine ratio    Lipid panel    Hemoglobin A1c    Comprehensive metabolic panel       Mental Health    Current moderate episode of major depressive disorder without prior episode (CMS/[facility])   Other Visit Diagnoses     Trigger finger, left index finger    -  Primary      she is following up with endo next month have labs done     Disc seeing hand ortho     cna try voltaren gel topically   I would avoid nsaids       There are no Patient Instructions on file for this visit.    Ruba Beavers MD  "

## 2023-12-24 ENCOUNTER — TELEPHONE (OUTPATIENT)
Dept: SCHEDULING | Facility: CLINIC | Age: 53
End: 2023-12-24
Payer: COMMERCIAL

## 2023-12-24 DIAGNOSIS — E10.65 TYPE 1 DIABETES MELLITUS WITH HYPERGLYCEMIA (CMS/HCC): ICD-10-CM

## 2023-12-24 RX ORDER — ATORVASTATIN CALCIUM 20 MG/1
20 TABLET, FILM COATED ORAL DAILY
Qty: 30 TABLET | Refills: 2 | OUTPATIENT
Start: 2023-12-24

## 2023-12-24 NOTE — TELEPHONE ENCOUNTER
DR CARLSON   pt--pharmacy requests refill ATROVASTATIN    Last OV was  11/8/2022  no appts scheduled    Notified Pharmacy to advise PT to call our office for an office visit for evaluation and any refills    Not escribed.    Please advise of any changes.    Thank you    PSR pool--please reach out to PT to make an appointment for evaluation and refills--Thank you

## 2023-12-26 DIAGNOSIS — E10.65 TYPE 1 DIABETES MELLITUS WITH HYPERGLYCEMIA (CMS/HCC): ICD-10-CM

## 2023-12-26 RX ORDER — ATORVASTATIN CALCIUM 20 MG/1
20 TABLET, FILM COATED ORAL DAILY
Qty: 30 TABLET | Refills: 0 | Status: SHIPPED | OUTPATIENT
Start: 2023-12-26 | End: 2024-01-11 | Stop reason: SDUPTHER

## 2023-12-26 NOTE — TELEPHONE ENCOUNTER
CB patient about refill prescription. LVM to please CB. 30 day refill prescription sent to patient's pharmacy

## 2023-12-30 DIAGNOSIS — E87.5 HYPERKALEMIA: Primary | ICD-10-CM

## 2023-12-30 LAB
ALBUMIN SERPL-MCNC: 4 G/DL (ref 3.8–4.9)
ALBUMIN/CREAT UR: <11 MG/G CREAT (ref 0–29)
ALBUMIN/GLOB SERPL: 1.7 {RATIO} (ref 1.2–2.2)
ALP SERPL-CCNC: 95 IU/L (ref 44–121)
ALT SERPL-CCNC: 25 IU/L (ref 0–32)
AST SERPL-CCNC: 17 IU/L (ref 0–40)
BILIRUB SERPL-MCNC: 0.5 MG/DL (ref 0–1.2)
BUN SERPL-MCNC: 7 MG/DL (ref 6–24)
BUN/CREAT SERPL: 6 (ref 9–23)
CALCIUM SERPL-MCNC: 9.7 MG/DL (ref 8.7–10.2)
CHLORIDE SERPL-SCNC: 100 MMOL/L (ref 96–106)
CHOLEST SERPL-MCNC: 129 MG/DL (ref 100–199)
CO2 SERPL-SCNC: 25 MMOL/L (ref 20–29)
CREAT SERPL-MCNC: 1.1 MG/DL (ref 0.57–1)
CREAT UR-MCNC: 28.3 MG/DL
EGFRCR SERPLBLD CKD-EPI 2021: 60 ML/MIN/1.73
GLOBULIN SER CALC-MCNC: 2.3 G/DL (ref 1.5–4.5)
GLUCOSE SERPL-MCNC: 275 MG/DL (ref 70–99)
HBA1C MFR BLD: 8.1 % (ref 4.8–5.6)
HDLC SERPL-MCNC: 40 MG/DL
LDLC SERPL CALC-MCNC: 69 MG/DL (ref 0–99)
MICROALBUMIN UR-MCNC: <3 UG/ML
POTASSIUM SERPL-SCNC: 5.6 MMOL/L (ref 3.5–5.2)
PROT SERPL-MCNC: 6.3 G/DL (ref 6–8.5)
SODIUM SERPL-SCNC: 137 MMOL/L (ref 134–144)
TRIGL SERPL-MCNC: 108 MG/DL (ref 0–149)
VLDLC SERPL CALC-MCNC: 20 MG/DL (ref 5–40)

## 2024-01-03 RX ORDER — ZOLPIDEM TARTRATE 5 MG/1
TABLET ORAL
Qty: 30 TABLET | Refills: 1 | Status: SHIPPED | OUTPATIENT
Start: 2024-01-03 | End: 2024-01-04 | Stop reason: SDUPTHER

## 2024-01-03 NOTE — TELEPHONE ENCOUNTER
"Medicine Refill Request    Last Office Visit: 12/15/2023  Next Office Visit: Visit date not found        Current Outpatient Medications:   •  atorvastatin (LIPITOR) 20 mg tablet, Take 1 tablet (20 mg total) by mouth daily. Additional refills require an in office evaluation. Please call [phone] to schedule, Disp: 30 tablet, Rfl: 0  •  azelastine 0.15 % (205.5 mcg) nasal spray, ADMINISTER 1 SPRAY INTO EACH NOSTRIL 2 TIMES A DAY AS NEEDED FOR RHINITIS., Disp: 30 mL, Rfl: 4  •  BD ULTRA-FINE SHORT PEN NEEDLE 31 gauge x 5/16\" needle, 1 EACH DAILY. BD ULTRA FINE, Disp: , Rfl:   •  blood-glucose sensor (DEXCOM G6 SENSOR) device device, 1 each by Other route., Disp: , Rfl:   •  blood-glucose transmitter (DEXCOM G6 TRANSMITTER) device, USE ONE TRANSMITTER WITH /SENSORS AND REPLACE EVERY 3 MONTHS, Disp: , Rfl:   •  buPROPion XL (WELLBUTRIN XL) 150 mg 24 hr tablet, TAKE 1 TABLET BY MOUTH EVERY DAY, Disp: 30 tablet, Rfl: 5  •  CLINPRO 5000 1.1 % paste, BRUSH TEETH FOR 1 MINUTE TWICE A DAY DO NOT RINSE, EAT OR DRINK FOR 30 MINS AFTER, Disp: , Rfl:   •  dextromethorphan-guaifenesin (MUCINEX DM)  mg tablet extended release 12 hr, Take 1 tablet by mouth every 12 (twelve) hours. Prn cough congestion ( one 1 tab q 12 hrs), Disp: 14 each, Rfl: 0  •  escitalopram (LEXAPRO) 10 mg tablet, TAKE 1 TABLET BY MOUTH EVERY DAY, Disp: 90 tablet, Rfl: 1  •  fluticasone propionate (FLONASE) 50 mcg/actuation nasal spray, Administer 1 spray into each nostril daily., Disp: 16 g, Rfl: 1  •  insulin aspart (NovoLOG) 100 unit/mL patient supplied pump, Inject under the skin continuously., Disp: , Rfl:   •  insulin glargine U-100 (LANTUS) 100 unit/mL injection, Pt states she's on 42 units, Disp: , Rfl:   •  LANTUS SOLOSTAR U-100 INSULIN 100 unit/mL (3 mL) pen, INJECT 44 UNITS UNDER THE SKIN NIGHTLY., Disp: , Rfl:   •  levothyroxine (SYNTHROID) 75 mcg tablet, 1 TABLET ON AN EMPTY STOMACH IN THE MORNING, Disp: , Rfl: 3  •  loratadine " (CLARITIN) 10 mg tablet, TAKE 1 TABLET BY MOUTH EVERY DAY, Disp: 30 tablet, Rfl: 2  •  mometasone (NASONEX) 50 mcg/actuation nasal spray, Administer 2 sprays into each nostril daily., Disp: 17 g, Rfl: 1  •  montelukast (SINGULAIR) 10 mg tablet, TAKE 1 TABLET BY MOUTH EVERY DAY AT NIGHT, Disp: 30 tablet, Rfl: 5  •  NovoLIN N NPH U-100 Insulin 100 unit/mL injection, INJECT 40 UNITS SUBCUTANEOUS DAILY AT BEDTIME, Disp: , Rfl:   •  zolpidem (AMBIEN) 5 mg tablet, TAKE 1 TABLET BY MOUTH NIGHTLY EVERY DAY AT BEDTIME AS NEEDED FOR SLEEP, Disp: 30 tablet, Rfl: 1      BP Readings from Last 3 Encounters:   12/15/23 132/80   09/20/23 140/78   08/01/23 140/78       Recent Lab results:  Lab Results   Component Value Date    CHOL 129 12/29/2023   ,   Lab Results   Component Value Date    HDL 40 12/29/2023   ,   Lab Results   Component Value Date    LDLCALC 69 12/29/2023   ,   Lab Results   Component Value Date    TRIG 108 12/29/2023        Lab Results   Component Value Date    GLUCOSE 275 (H) 12/29/2023   ,   Lab Results   Component Value Date    HGBA1C 8.1 (H) 12/29/2023         Lab Results   Component Value Date    CREATININE 1.10 (H) 12/29/2023       Lab Results   Component Value Date    TSH 1.810 07/08/2022

## 2024-01-03 NOTE — TELEPHONE ENCOUNTER
Ambien 5 mg   Filled on 11/30/2023  # 30/30  PDMP checked with no red flags    Last Office Visit: 12/15/2023   Last Consult Visit: 4/6/2021  Last Telemedicine Visit: 10/31/2023 Ruba Beavers MD    Next Appointment: Visit date not found

## 2024-01-04 RX ORDER — ZOLPIDEM TARTRATE 5 MG/1
5 TABLET ORAL NIGHTLY PRN
Qty: 30 TABLET | Refills: 1 | Status: SHIPPED | OUTPATIENT
Start: 2024-01-04 | End: 2024-03-06 | Stop reason: SDUPTHER

## 2024-01-04 NOTE — TELEPHONE ENCOUNTER
zolpidem (AMBIEN) 5 mg tablet     Filled on 11/30/2023  # 30/30  PDMP checked without red flags       Last Office Visit: 12/15/2023   Last Consult Visit: 4/6/2021  Last Telemedicine Visit: 10/31/2023 Ruba Beavers MD    Next Appointment: Visit date not found

## 2024-01-10 ENCOUNTER — TELEPHONE (OUTPATIENT)
Dept: SCHEDULING | Facility: CLINIC | Age: 54
End: 2024-01-10

## 2024-01-10 DIAGNOSIS — J30.89 OTHER ALLERGIC RHINITIS: ICD-10-CM

## 2024-01-10 DIAGNOSIS — E10.65 TYPE 1 DIABETES MELLITUS WITH HYPERGLYCEMIA (CMS/HCC): ICD-10-CM

## 2024-01-10 RX ORDER — ATORVASTATIN CALCIUM 20 MG/1
TABLET, FILM COATED ORAL
Qty: 30 TABLET | Refills: 0 | OUTPATIENT
Start: 2024-01-10

## 2024-01-10 NOTE — TELEPHONE ENCOUNTER
DR CARLSON  pt--pharmacy requests refill ATORVASTATIN    Last OV was  11/8/2022  no appts scheduled    Notified Pharmacy to advise PT to call our office for an office visit for evaluation and any refills    Not escribed.    Please advise of any changes.    Thank you    PSR pool--please reach out to PT to make an appointment for evaluation and refills--Thank you

## 2024-01-11 ENCOUNTER — TELEPHONE (OUTPATIENT)
Dept: CARDIOLOGY | Facility: CLINIC | Age: 54
End: 2024-01-11
Payer: COMMERCIAL

## 2024-01-11 ENCOUNTER — TELEPHONE (OUTPATIENT)
Dept: SCHEDULING | Facility: CLINIC | Age: 54
End: 2024-01-11

## 2024-01-11 DIAGNOSIS — I21.4 NSTEMI (NON-ST ELEVATED MYOCARDIAL INFARCTION) (CMS/HCC): ICD-10-CM

## 2024-01-11 DIAGNOSIS — E10.65 TYPE 1 DIABETES MELLITUS WITH HYPERGLYCEMIA (CMS/HCC): ICD-10-CM

## 2024-01-11 DIAGNOSIS — E78.00 PURE HYPERCHOLESTEROLEMIA: Primary | ICD-10-CM

## 2024-01-11 RX ORDER — ATORVASTATIN CALCIUM 20 MG/1
20 TABLET, FILM COATED ORAL DAILY
Qty: 90 TABLET | Refills: 3 | Status: SHIPPED | OUTPATIENT
Start: 2024-01-11 | End: 2024-05-01

## 2024-01-11 RX ORDER — LORATADINE 10 MG/1
TABLET ORAL
Qty: 30 TABLET | Refills: 2 | Status: SHIPPED | OUTPATIENT
Start: 2024-01-11 | End: 2024-04-09

## 2024-01-11 NOTE — TELEPHONE ENCOUNTER
Precert Auth Scheduling:     Name: Annette Montes De Oca    Auth Location: \[facility]\""    Auth Number: [id number]       Valid dates: 01/11/2024 - 04/09/2024    Procedure: Stress Echo    1. Scheduling notes:Please call patient to assist with scheduling. Thank you!

## 2024-01-11 NOTE — TELEPHONE ENCOUNTER
Dr Barraase patient called stating she would like to follow up with you.    Please regenerte an order for echo stress, order in chart . Please precert through insurance for kop , call patient to schedule.    New insurance is in chart.    Please call her at [phone]. ty

## 2024-01-11 NOTE — TELEPHONE ENCOUNTER
Advised per Dr Johnson, patient was to f/u with us for a yearly appointment and stress echo. If patient would like a refill on atorvastatin, we need to make an office appointment and stress echo auth/appointment. If Annette no longer wants to f/u with cardiology, then her PCP will be able to manage her cardiac medications

## 2024-01-22 ENCOUNTER — TELEPHONE (OUTPATIENT)
Dept: CARDIOLOGY | Facility: CLINIC | Age: 54
End: 2024-01-22
Payer: COMMERCIAL

## 2024-01-22 NOTE — TELEPHONE ENCOUNTER
LVM for pt that SE needs to be rescheduled, due to Dr Johnson being out of the office.      When pt calls back, Se can be rescheduled to any of the SE openings the week of March 4th.  If pt is avail that week, she can keep her OV on 3/12.

## 2024-01-23 NOTE — TELEPHONE ENCOUNTER
Pt returned call and accepted 3/8 stress echo at [facility].    Pt can be reached at [phone]

## 2024-01-31 DIAGNOSIS — F41.9 ANXIETY: ICD-10-CM

## 2024-01-31 DIAGNOSIS — F32.1 CURRENT MODERATE EPISODE OF MAJOR DEPRESSIVE DISORDER WITHOUT PRIOR EPISODE (CMS/HCC): ICD-10-CM

## 2024-02-01 RX ORDER — BUPROPION HYDROCHLORIDE 150 MG/1
TABLET ORAL
Qty: 30 TABLET | Refills: 5 | Status: SHIPPED | OUTPATIENT
Start: 2024-02-01 | End: 2024-08-08

## 2024-02-02 NOTE — PROGRESS NOTES
Assessment/Plan:    Encounter for gynecological examination (general) (routine) without abnormal findings  52 yo G0 new patient here for well check. Stopped OCPs in 7/2023 without further menses. Had hot flashes in 2022, none now. No breast or gyn complaints.   Normal breast and limited pelvic exam.   Last pap 2/2023 neg/HPV neg; due by 2028  Mammo order given, last  3/28/23  Colonoscopy 2023, due 2026 ([place])       Diagnoses and all orders for this visit:    Encounter for gynecological examination (general) (routine) without abnormal findings    Encounter for screening mammogram for malignant neoplasm of breast  -     Mammo screening bilateral w 3d & cad; Future    Other orders  -     glucose blood test strip; Use 1 each  -     Continuous Blood Gluc Transmit (Dexcom G6 Transmitter) MISC; USE ONE TRANSMITTER WITH /SENSORS AND REPLACE EVERY 3 MONTHS  -     buPROPion (WELLBUTRIN XL) 150 mg 24 hr tablet; Take 1 tablet by mouth daily  -     dextromethorphan-guaifenesin (MUCINEX DM)  MG per 12 hr tablet; Take 1 tablet by mouth 2 (two) times a day  -     escitalopram (LEXAPRO) 10 mg tablet; Take 10 mg by mouth daily  -     fluticasone (FLONASE) 50 mcg/act nasal spray; 1 spray into each nostril daily  -     NovoLOG 100 UNIT/ML injection; INJECT 6 UNITS TOTAL UNDER THE SKIN 3 (THREE) TIMES A DAY BEFORE MEALS.  -     Lantus SoloStar 100 units/mL SOPN  -     B-D ULTRAFINE III SHORT PEN 31G X 8 MM MISC; 1 EACH DAILY. BD ULTRA FINE  -     levothyroxine 75 mcg tablet; TAKE 1 TABLET BY MOUTH DAILY. IN THE MORNING ON EMPTY STOMACH  -     loratadine (CLARITIN) 10 mg tablet; Take 1 tablet by mouth daily  -     montelukast (SINGULAIR) 10 mg tablet; TAKE 1 TABLET BY MOUTH EVERY DAY AT NIGHT  -     PARoxetine (PAXIL) 40 MG tablet  -     zolpidem (AMBIEN) 5 mg tablet; Take 5 mg by mouth daily at bedtime as needed for sleep  -     Liquid-based pap, screening  -     atorvastatin (LIPITOR) 20 mg tablet; Take 20 mg by mouth  daily          Subjective:      Patient ID: Jacki Gooden is a 53 y.o. female.    HPI NP here for well check    The following portions of the patient's history were reviewed and updated as appropriate: She  has a past medical history of Anemia ([id number]), Cellulitis, Chronic bronchitis (HCC), Colitis, Depression and anxiety, Diabetes mellitus (HCC) - type I since 1 yr old, History of colonoscopy, History of concussion x2, History of diabetic ketoacidosis, History of gastritis, History of migraine x 2, History of mononucleosis x 2, History of otitis, History of pancreatitis (1984), History of pyelonephritis, History of staph infection, History of strep pharyngitis, History of varicella, Hyperlipidemia, Hypothyroidism, Plantar fasciitis, Sinusitis, Tendonitis, Thrombosis - right upper arm, and Varicella.  She   Patient Active Problem List    Diagnosis Date Noted    Encounter for gynecological examination (general) (routine) without abnormal findings 02/06/2024     She  has a past surgical history that includes Hernia repair; Carpal tunnel release (Left); Nasal septum surgery; Toe Surgery (Right); Left oophorectomy; Cataract extraction (Left); Refractive surgery (Bilateral); Dental surgery; Oophorectomy (Left, 2015); Trigger finger release (Left); Colonoscopy; and Refractive surgery (Bilateral).  Her family history includes Colon cancer in her paternal grandfather; Deep vein thrombosis in her mother; Diabetes in her father; Hyperlipidemia in her father.  She  reports that she has never smoked. She has never used smokeless tobacco. She reports that she does not currently use alcohol. She reports that she does not use drugs.  Current Outpatient Medications   Medication Sig Dispense Refill    atorvastatin (LIPITOR) 20 mg tablet Take 20 mg by mouth daily      B-D ULTRAFINE III SHORT PEN 31G X 8 MM MISC 1 EACH DAILY. BD ULTRA FINE      buPROPion (WELLBUTRIN XL) 150 mg 24 hr tablet Take 1 tablet by mouth daily       "Continuous Blood Gluc Transmit (Dexcom G6 Transmitter) MISC USE ONE TRANSMITTER WITH /SENSORS AND REPLACE EVERY 3 MONTHS      dextromethorphan-guaifenesin (MUCINEX DM)  MG per 12 hr tablet Take 1 tablet by mouth 2 (two) times a day      escitalopram (LEXAPRO) 10 mg tablet Take 10 mg by mouth daily      fluticasone (FLONASE) 50 mcg/act nasal spray 1 spray into each nostril daily      glucose blood test strip Use 1 each      Lantus SoloStar 100 units/mL SOPN       levothyroxine 75 mcg tablet TAKE 1 TABLET BY MOUTH DAILY. IN THE MORNING ON EMPTY STOMACH      loratadine (CLARITIN) 10 mg tablet Take 1 tablet by mouth daily      montelukast (SINGULAIR) 10 mg tablet TAKE 1 TABLET BY MOUTH EVERY DAY AT NIGHT      NovoLOG 100 UNIT/ML injection INJECT 6 UNITS TOTAL UNDER THE SKIN 3 (THREE) TIMES A DAY BEFORE MEALS.      PARoxetine (PAXIL) 40 MG tablet       zolpidem (AMBIEN) 5 mg tablet Take 5 mg by mouth daily at bedtime as needed for sleep       No current facility-administered medications for this visit.     She is allergic to codeine, molds & smuts, pollen extract, cortisone, oxycodone, and penicillins..    Review of Systems  No PMB, breast, bladder, bowel changes. No new persistent pain, early satiety or pelvic pressure  +intermittent itching  +weight issues with abdominal fat deposition    Objective:      /68   Ht 5' 2.5\" (1.588 m)   Wt 88.6 kg (195 lb 6.4 oz)   Breastfeeding No   BMI 35.17 kg/m²     Declined chaperone       Physical Exam    General appearance: no distress, pleasant  Neck: thyroid without nodules or thyromegaly, no palpable adenopathy  Lymph nodes: no palpable adenopathy  Breasts: no masses, nodes or skin changes  Abdomen: soft, non tender, no palpable masses  Pelvic exam: normal atrophic external genitalia, urethral meatus normal, vagina atrophic without lesions, **pedes spec** difficult exam due to habitus and guarding, cervix poorly visualized, palpated normal, bimanual " limited due to habitus, uterus small, non tender, no adnexal masses, non tender  Rectal exam: normal sphincter tone, limited due to habitus,  no masses, RV confirms above

## 2024-02-06 ENCOUNTER — OFFICE VISIT (OUTPATIENT)
Dept: OBGYN CLINIC | Facility: CLINIC | Age: 54
End: 2024-02-06
Payer: COMMERCIAL

## 2024-02-06 VITALS
SYSTOLIC BLOOD PRESSURE: 140 MMHG | DIASTOLIC BLOOD PRESSURE: 68 MMHG | HEIGHT: 63 IN | WEIGHT: 195.4 LBS | BODY MASS INDEX: 34.62 KG/M2

## 2024-02-06 DIAGNOSIS — Z12.31 ENCOUNTER FOR SCREENING MAMMOGRAM FOR MALIGNANT NEOPLASM OF BREAST: ICD-10-CM

## 2024-02-06 DIAGNOSIS — Z01.419 ENCOUNTER FOR GYNECOLOGICAL EXAMINATION (GENERAL) (ROUTINE) WITHOUT ABNORMAL FINDINGS: Primary | ICD-10-CM

## 2024-02-06 PROCEDURE — S0610 ANNUAL GYNECOLOGICAL EXAMINA: HCPCS | Performed by: OBSTETRICS & GYNECOLOGY

## 2024-02-06 RX ORDER — MONTELUKAST SODIUM 10 MG/1
TABLET ORAL
COMMUNITY

## 2024-02-06 RX ORDER — ZOLPIDEM TARTRATE 5 MG/1
5 TABLET ORAL
COMMUNITY

## 2024-02-06 RX ORDER — PEN NEEDLE, DIABETIC 31 GX5/16"
NEEDLE, DISPOSABLE MISCELLANEOUS
COMMUNITY
Start: 2024-01-30

## 2024-02-06 RX ORDER — FLUTICASONE PROPIONATE 50 MCG
1 SPRAY, SUSPENSION (ML) NASAL DAILY
COMMUNITY
Start: 2023-09-12

## 2024-02-06 RX ORDER — INSULIN GLARGINE 100 [IU]/ML
INJECTION, SOLUTION SUBCUTANEOUS
COMMUNITY
Start: 2022-08-09

## 2024-02-06 RX ORDER — PROCHLORPERAZINE 25 MG/1
SUPPOSITORY RECTAL
COMMUNITY
Start: 2023-12-19

## 2024-02-06 RX ORDER — LEVOTHYROXINE SODIUM 0.07 MG/1
TABLET ORAL
COMMUNITY

## 2024-02-06 RX ORDER — BUPROPION HYDROCHLORIDE 150 MG/1
1 TABLET ORAL DAILY
COMMUNITY
Start: 2023-08-31

## 2024-02-06 RX ORDER — ATORVASTATIN CALCIUM 20 MG/1
20 TABLET, FILM COATED ORAL DAILY
COMMUNITY

## 2024-02-06 RX ORDER — LORATADINE 10 MG/1
1 TABLET ORAL DAILY
COMMUNITY
Start: 2024-01-11

## 2024-02-06 RX ORDER — INSULIN ASPART 100 [IU]/ML
INJECTION, SOLUTION INTRAVENOUS; SUBCUTANEOUS
COMMUNITY

## 2024-02-06 RX ORDER — ESCITALOPRAM OXALATE 10 MG/1
10 TABLET ORAL DAILY
COMMUNITY

## 2024-02-06 RX ORDER — PAROXETINE HYDROCHLORIDE 40 MG/1
TABLET, FILM COATED ORAL
COMMUNITY

## 2024-02-06 NOTE — ASSESSMENT & PLAN NOTE
52 yo G0 new patient here for well check. Stopped OCPs in 7/2023 without further menses. Had hot flashes in 2022, none now. No breast or gyn complaints.   Normal breast and limited pelvic exam.   Last pap 2/2023 neg/HPV neg; due by 2028  Mammo order given, last  3/28/23  Colonoscopy 2023, due 2026 ([facility])

## 2024-02-06 NOTE — LETTER
February 6, 2024     Miranda Perez MD  [address] [facility] [address]    Patient: Jacki Gooden   YOB: 1970   Date of Visit: 2/6/2024       Dear Dr. Perez:    Thank you for referring Jacki Gooden to me for evaluation. Below are my notes for this consultation.    If you have questions, please do not hesitate to call me. I look forward to following your patient along with you.         Sincerely,        Vanita Ramos MD        CC: No Recipients    Vanita Ramos MD  2/6/2024  3:02 PM  Sign when Signing Visit  Assessment/Plan:    Encounter for gynecological examination (general) (routine) without abnormal findings  52 yo G0 new patient here for well check. Stopped OCPs in 7/2023 without further menses. Had hot flashes in 2022, none now. No breast or gyn complaints.   Normal breast and limited pelvic exam.   Last pap 2/2023 neg/HPV neg; due by 2028  Mammo order given, last  3/28/23  Colonoscopy 2023, due 2026 ([facility])       Diagnoses and all orders for this visit:    Encounter for gynecological examination (general) (routine) without abnormal findings    Encounter for screening mammogram for malignant neoplasm of breast  -     Mammo screening bilateral w 3d & cad; Future    Other orders  -     glucose blood test strip; Use 1 each  -     Continuous Blood Gluc Transmit (Dexcom G6 Transmitter) MISC; USE ONE TRANSMITTER WITH /SENSORS AND REPLACE EVERY 3 MONTHS  -     buPROPion (WELLBUTRIN XL) 150 mg 24 hr tablet; Take 1 tablet by mouth daily  -     dextromethorphan-guaifenesin (MUCINEX DM)  MG per 12 hr tablet; Take 1 tablet by mouth 2 (two) times a day  -     escitalopram (LEXAPRO) 10 mg tablet; Take 10 mg by mouth daily  -     fluticasone (FLONASE) 50 mcg/act nasal spray; 1 spray into each nostril daily  -     NovoLOG 100 UNIT/ML injection; INJECT 6 UNITS TOTAL UNDER THE SKIN 3 (THREE) TIMES A DAY BEFORE MEALS.  -     Lantus SoloStar 100 units/mL SOPN  -     B-D ULTRAFINE III  SHORT PEN 31G X 8 MM MISC; 1 EACH DAILY. BD ULTRA FINE  -     levothyroxine 75 mcg tablet; TAKE 1 TABLET BY MOUTH DAILY. IN THE MORNING ON EMPTY STOMACH  -     loratadine (CLARITIN) 10 mg tablet; Take 1 tablet by mouth daily  -     montelukast (SINGULAIR) 10 mg tablet; TAKE 1 TABLET BY MOUTH EVERY DAY AT NIGHT  -     PARoxetine (PAXIL) 40 MG tablet  -     zolpidem (AMBIEN) 5 mg tablet; Take 5 mg by mouth daily at bedtime as needed for sleep  -     Liquid-based pap, screening  -     atorvastatin (LIPITOR) 20 mg tablet; Take 20 mg by mouth daily          Subjective:      Patient ID: Jacki Gooden is a 53 y.o. female.    HPI NP here for well check    The following portions of the patient's history were reviewed and updated as appropriate: She  has a past medical history of Anemia ([id number]), Cellulitis, Chronic bronchitis (HCC), Colitis, Depression and anxiety, Diabetes mellitus (HCC) - type I since 1 yr old, History of colonoscopy, History of concussion x2, History of diabetic ketoacidosis, History of gastritis, History of migraine x 2, History of mononucleosis x 2, History of otitis, History of pancreatitis (1984), History of pyelonephritis, History of staph infection, History of strep pharyngitis, History of varicella, Hyperlipidemia, Hypothyroidism, Plantar fasciitis, Sinusitis, Tendonitis, Thrombosis - right upper arm, and Varicella.  She   Patient Active Problem List    Diagnosis Date Noted   • Encounter for gynecological examination (general) (routine) without abnormal findings 02/06/2024     She  has a past surgical history that includes Hernia repair; Carpal tunnel release (Left); Nasal septum surgery; Toe Surgery (Right); Left oophorectomy; Cataract extraction (Left); Refractive surgery (Bilateral); Dental surgery; Oophorectomy (Left, 2015); Trigger finger release (Left); Colonoscopy; and Refractive surgery (Bilateral).  Her family history includes Colon cancer in her paternal grandfather; Deep vein  "thrombosis in her mother; Diabetes in her father; Hyperlipidemia in her father.  She  reports that she has never smoked. She has never used smokeless tobacco. She reports that she does not currently use alcohol. She reports that she does not use drugs.  Current Outpatient Medications   Medication Sig Dispense Refill   • atorvastatin (LIPITOR) 20 mg tablet Take 20 mg by mouth daily     • B-D ULTRAFINE III SHORT PEN 31G X 8 MM MISC 1 EACH DAILY. BD ULTRA FINE     • buPROPion (WELLBUTRIN XL) 150 mg 24 hr tablet Take 1 tablet by mouth daily     • Continuous Blood Gluc Transmit (Dexcom G6 Transmitter) MISC USE ONE TRANSMITTER WITH /SENSORS AND REPLACE EVERY 3 MONTHS     • dextromethorphan-guaifenesin (MUCINEX DM)  MG per 12 hr tablet Take 1 tablet by mouth 2 (two) times a day     • escitalopram (LEXAPRO) 10 mg tablet Take 10 mg by mouth daily     • fluticasone (FLONASE) 50 mcg/act nasal spray 1 spray into each nostril daily     • glucose blood test strip Use 1 each     • Lantus SoloStar 100 units/mL SOPN      • levothyroxine 75 mcg tablet TAKE 1 TABLET BY MOUTH DAILY. IN THE MORNING ON EMPTY STOMACH     • loratadine (CLARITIN) 10 mg tablet Take 1 tablet by mouth daily     • montelukast (SINGULAIR) 10 mg tablet TAKE 1 TABLET BY MOUTH EVERY DAY AT NIGHT     • NovoLOG 100 UNIT/ML injection INJECT 6 UNITS TOTAL UNDER THE SKIN 3 (THREE) TIMES A DAY BEFORE MEALS.     • PARoxetine (PAXIL) 40 MG tablet      • zolpidem (AMBIEN) 5 mg tablet Take 5 mg by mouth daily at bedtime as needed for sleep       No current facility-administered medications for this visit.     She is allergic to codeine, molds & smuts, pollen extract, cortisone, oxycodone, and penicillins..    Review of Systems  No PMB, breast, bladder, bowel changes. No new persistent pain, early satiety or pelvic pressure  +intermittent itching  +weight issues with abdominal fat deposition    Objective:      /68   Ht 5' 2.5\" (1.588 m)   Wt 88.6 kg (195 " lb 6.4 oz)   Breastfeeding No   BMI 35.17 kg/m²     Declined chaperone       Physical Exam    General appearance: no distress, pleasant  Neck: thyroid without nodules or thyromegaly, no palpable adenopathy  Lymph nodes: no palpable adenopathy  Breasts: no masses, nodes or skin changes  Abdomen: soft, non tender, no palpable masses  Pelvic exam: normal atrophic external genitalia, urethral meatus normal, vagina atrophic without lesions, **pedes spec** difficult exam due to habitus and guarding, cervix poorly visualized, palpated normal, bimanual limited due to habitus, uterus small, non tender, no adnexal masses, non tender  Rectal exam: normal sphincter tone, limited due to habitus,  no masses, RV confirms above

## 2024-02-06 NOTE — PATIENT INSTRUCTIONS
Return to office in one year unless having any problems such as breast changes, bleeding, new persistent pain, new progressive bloating, new problems eating (getting full to quickly) or new constant urinary pressure that does not resolve in one week.      Continue to strive for 1200 mg of calcium and 1000 IU of vitamin D daily in diet and supplements combined for your bone health.  You can only absorb 600 mg of calcium at a time. Avoid excess calcium as this may adversely effect your heart.  There are 400 mg of calcium in an 8 oz serving of dairy.

## 2024-02-21 PROBLEM — Z01.419 ENCOUNTER FOR GYNECOLOGICAL EXAMINATION (GENERAL) (ROUTINE) WITHOUT ABNORMAL FINDINGS: Status: RESOLVED | Noted: 2024-02-06 | Resolved: 2024-02-21

## 2024-02-22 ENCOUNTER — OFFICE VISIT (OUTPATIENT)
Dept: PRIMARY CARE | Facility: CLINIC | Age: 54
End: 2024-02-22
Payer: COMMERCIAL

## 2024-02-22 VITALS
HEART RATE: 68 BPM | SYSTOLIC BLOOD PRESSURE: 130 MMHG | BODY MASS INDEX: 34.94 KG/M2 | WEIGHT: 197.2 LBS | DIASTOLIC BLOOD PRESSURE: 80 MMHG | RESPIRATION RATE: 18 BRPM | TEMPERATURE: 97.9 F | HEIGHT: 63 IN | OXYGEN SATURATION: 98 %

## 2024-02-22 DIAGNOSIS — E10.3553 STABLE PROLIFERATIVE DIABETIC RETINOPATHY OF BOTH EYES ASSOCIATED WITH TYPE 1 DIABETES MELLITUS (CMS/HCC): ICD-10-CM

## 2024-02-22 DIAGNOSIS — E10.65 TYPE 1 DIABETES MELLITUS WITH HYPERGLYCEMIA (CMS/HCC): ICD-10-CM

## 2024-02-22 DIAGNOSIS — R30.0 DYSURIA: Primary | ICD-10-CM

## 2024-02-22 LAB
BILIRUBIN, POC: NEGATIVE
BLOOD URINE, POC: NEGATIVE
CLARITY, POC: CLEAR
COLOR, POC: YELLOW
EXPIRATION DATE: ABNORMAL
GLUCOSE URINE, POC: NEGATIVE
KETONES, POC: NEGATIVE
LEUKOCYTE EST, POC: ABNORMAL
Lab: ABNORMAL
NITRITE, POC: NEGATIVE
PH, POC: 8
POCT MANUFACTURER: ABNORMAL
PROTEIN, POC: ABNORMAL
SPECIFIC GRAVITY, POC: 1
UROBILINOGEN, POC: 1

## 2024-02-22 PROCEDURE — 81002 URINALYSIS NONAUTO W/O SCOPE: CPT | Performed by: NURSE PRACTITIONER

## 2024-02-22 PROCEDURE — 3008F BODY MASS INDEX DOCD: CPT | Performed by: NURSE PRACTITIONER

## 2024-02-22 PROCEDURE — 99213 OFFICE O/P EST LOW 20 MIN: CPT | Performed by: NURSE PRACTITIONER

## 2024-02-22 RX ORDER — NITROFURANTOIN 25; 75 MG/1; MG/1
100 CAPSULE ORAL 2 TIMES DAILY
Qty: 10 CAPSULE | Refills: 0 | Status: SHIPPED | OUTPATIENT
Start: 2024-02-22 | End: 2024-02-27

## 2024-02-22 ASSESSMENT — ENCOUNTER SYMPTOMS
DIZZINESS: 0
NAUSEA: 0
WHEEZING: 0
FREQUENCY: 1
HEADACHES: 0
ABDOMINAL PAIN: 0
LIGHT-HEADEDNESS: 0
WEAKNESS: 0
HEMATURIA: 0
DIARRHEA: 0
CHILLS: 1
COUGH: 0
FLANK PAIN: 0
FATIGUE: 1
VOMITING: 0
CHEST TIGHTNESS: 0
DYSURIA: 1
SHORTNESS OF BREATH: 0

## 2024-02-22 NOTE — PROGRESS NOTES
"   [facility]  [address], [address]  [place] [address]  Phone [phone] Fax [phone]     Reason for visit:   Chief Complaint   Patient presents with   • Pelvic Pain     Sx started 2 days ago, pelvic pressure, painful urination, urinary frequency, and fever of Tmax 100.4F hx of uti. Using otc meds \"brand new on the market\"   • Fever   • Urinary Frequency      HPI   Annette Montes De Oca is a 54 y.o. female who presents with increased urinary frequency, pelvic pressure, and dysuria x 2 days, also with tmax of 100.4 F.   No abdominal or flank pain.  No n/v/d.  She has a hx of recurrent UTI,  dm1  Taking otc uquora UTI supplement.          Medical History:  Past Medical History:   Diagnosis Date   • Acute deep vein thrombosis (DVT) of brachial vein of right upper extremity (CMS/HCC) 8/28/2022   • Allergic    • Coronary artery disease    • Diabetes mellitus type I (CMS/HCC)    • Disease of thyroid gland    • Grand mal seizure (CMS/HCC)        Surgical History:  Past Surgical History:   Procedure Laterality Date   • CARPAL TUNNEL RELEASE     • FOOT SURGERY     • HERNIA REPAIR     • MOUTH SURGERY     • OOPHORECTOMY     • SINUS SURGERY     • WISDOM TOOTH EXTRACTION         Social History:  Social History     Social History Narrative    Raf petit       Family History:  Family History   Problem Relation Age of Onset   • Stroke Biological Brother    • Heart disease Maternal Grandmother    • Kidney disease Maternal Grandmother    • Prostate cancer Paternal Grandmother        Allergies:  Codeine, Cortisone, and Penicillins    Current Medications:  Current Outpatient Medications   Medication Sig Dispense Refill   • atorvastatin (LIPITOR) 20 mg tablet Take 1 tablet (20 mg total) by mouth daily. (Patient taking differently: Take 25 mg by mouth daily.) 90 tablet 3   • azelastine 0.15 % (205.5 mcg) nasal spray ADMINISTER 1 SPRAY INTO EACH NOSTRIL 2 TIMES A DAY AS NEEDED FOR RHINITIS. 30 mL 4   • BD " "ULTRA-FINE SHORT PEN NEEDLE 31 gauge x 5/16\" needle 1 EACH DAILY. BD ULTRA FINE     • blood-glucose sensor (DEXCOM G6 SENSOR) device device 1 each by Other route.     • blood-glucose transmitter (DEXCOM G6 TRANSMITTER) device USE ONE TRANSMITTER WITH /SENSORS AND REPLACE EVERY 3 MONTHS     • buPROPion XL (WELLBUTRIN XL) 150 mg 24 hr tablet TAKE 1 TABLET BY MOUTH EVERY DAY 30 tablet 5   • dextromethorphan-guaifenesin (MUCINEX DM)  mg tablet extended release 12 hr Take 1 tablet by mouth every 12 (twelve) hours. Prn cough congestion ( one 1 tab q 12 hrs) 14 each 0   • escitalopram (LEXAPRO) 10 mg tablet TAKE 1 TABLET BY MOUTH EVERY DAY 90 tablet 1   • fluticasone propionate (FLONASE) 50 mcg/actuation nasal spray Administer 1 spray into each nostril daily. 16 g 1   • insulin aspart (NovoLOG) 100 unit/mL patient supplied pump Inject under the skin continuously.     • insulin glargine U-100 (LANTUS) 100 unit/mL injection Pt states she's on 42 units     • LANTUS SOLOSTAR U-100 INSULIN 100 unit/mL (3 mL) pen INJECT 44 UNITS UNDER THE SKIN NIGHTLY.     • levothyroxine (SYNTHROID) 75 mcg tablet 1 TABLET ON AN EMPTY STOMACH IN THE MORNING  3   • loratadine (CLARITIN) 10 mg tablet TAKE 1 TABLET BY MOUTH EVERY DAY 30 tablet 2   • montelukast (SINGULAIR) 10 mg tablet TAKE 1 TABLET BY MOUTH EVERY DAY AT NIGHT 30 tablet 5   • nitrofurantoin, macrocrystal-monohydrate, (MACROBID) 100 mg capsule Take 1 capsule (100 mg total) by mouth 2 (two) times a day for 5 days. 10 capsule 0   • zolpidem (AMBIEN) 5 mg tablet Take 1 tablet (5 mg total) by mouth nightly as needed for sleep. 30 tablet 1   • CLINPRO 5000 1.1 % paste BRUSH TEETH FOR 1 MINUTE TWICE A DAY DO NOT RINSE, EAT OR DRINK FOR 30 MINS AFTER     • mometasone (NASONEX) 50 mcg/actuation nasal spray Administer 2 sprays into each nostril daily. 17 g 1   • NovoLIN N NPH U-100 Insulin 100 unit/mL injection INJECT 40 UNITS SUBCUTANEOUS DAILY AT BEDTIME       No current " facility-administered medications for this visit.       Review of Systems:  Review of Systems   Constitutional: Positive for chills and fatigue.   Respiratory: Negative for cough, chest tightness, shortness of breath and wheezing.    Cardiovascular: Negative for chest pain.   Gastrointestinal: Negative for abdominal pain, diarrhea, nausea and vomiting.   Genitourinary: Positive for dysuria, frequency and urgency. Negative for flank pain and hematuria.   Neurological: Negative for dizziness, weakness, light-headedness and headaches.       Objective     Vital Signs:  Vitals:    02/22/24 1606   BP: 130/80   Pulse: 68   Resp: 18   Temp: 36.6 °C (97.9 °F)   SpO2: 98%       BMI:  Body mass index is 34.93 kg/m².     Physical Exam  Vitals reviewed.   Constitutional:       Appearance: Normal appearance.   HENT:      Head: Normocephalic and atraumatic.   Cardiovascular:      Heart sounds: Normal heart sounds.   Pulmonary:      Effort: Pulmonary effort is normal.      Breath sounds: Normal breath sounds.   Abdominal:      General: Bowel sounds are normal.      Palpations: Abdomen is soft.      Tenderness: There is no abdominal tenderness. There is no guarding or rebound.   Neurological:      Mental Status: She is alert and oriented to person, place, and time.   Psychiatric:         Behavior: Behavior normal.         Recent labs before today:     Lab Results   Component Value Date    WBC 5.6 10/06/2017    HGB 14.3 10/06/2017    HCT 41.9 10/06/2017     (H) 10/06/2017    CHOL 129 12/29/2023    TRIG 108 12/29/2023    HDL 40 12/29/2023    ALT 25 12/29/2023    AST 17 12/29/2023     12/29/2023    K 5.6 (H) 12/29/2023     12/29/2023    CREATININE 1.10 (H) 12/29/2023    BUN 7 12/29/2023    CO2 25 12/29/2023    TSH 1.810 07/08/2022    HGBA1C 8.0 (H) 12/29/2023    MICROALBUR <3.0 12/29/2023        Procedures   Assessment     There are no Patient Instructions on file for this visit.    Problem List Items Addressed This  Visit        Nervous    Dysuria - Primary     Urine POCT is positive for leukocytes.  Start empiric antibiotic treatment.  Send out urine culture.  Warning signs and follow up precautions reviewed- contact office if any new/worsening symptoms including abdominal pain, fever, or chills.         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

## 2024-02-22 NOTE — ASSESSMENT & PLAN NOTE
Urine POCT is positive for leukocytes.  Start empiric antibiotic treatment.  Send out urine culture.  Warning signs and follow up precautions reviewed- contact office if any new/worsening symptoms including abdominal pain, fever, or chills.

## 2024-02-23 LAB
APPEARANCE UR: CLEAR
BACTERIA #/AREA URNS HPF: NORMAL /[HPF]
BILIRUB UR QL STRIP: NEGATIVE
CASTS URNS QL MICRO: NORMAL /LPF
COLOR UR: YELLOW
EPI CELLS #/AREA URNS HPF: NORMAL /HPF (ref 0–10)
GLUCOSE UR QL STRIP: NEGATIVE
HGB UR QL STRIP: NEGATIVE
KETONES UR QL STRIP: NEGATIVE
LEUKOCYTE ESTERASE UR QL STRIP: ABNORMAL
MICRO URNS: ABNORMAL
NITRITE UR QL STRIP: NEGATIVE
PH UR STRIP: 8 [PH] (ref 5–7.5)
PROT UR QL STRIP: NEGATIVE
RBC #/AREA URNS HPF: NORMAL /HPF (ref 0–2)
SP GR UR STRIP: 1.01 (ref 1–1.03)
UROBILINOGEN UR STRIP-MCNC: 0.2 MG/DL (ref 0.2–1)
WBC #/AREA URNS HPF: NORMAL /HPF (ref 0–5)

## 2024-02-24 LAB
BACTERIA UR CULT: NORMAL
BACTERIA UR CULT: NORMAL

## 2024-02-29 ENCOUNTER — TELEPHONE (OUTPATIENT)
Dept: PRIMARY CARE | Facility: CLINIC | Age: 54
End: 2024-02-29
Payer: COMMERCIAL

## 2024-03-04 ENCOUNTER — TELEPHONE (OUTPATIENT)
Dept: CARDIOLOGY | Facility: CLINIC | Age: 54
End: 2024-03-04
Payer: COMMERCIAL

## 2024-03-04 NOTE — TELEPHONE ENCOUNTER
LVM for pt that her upcoming OV on 3/12 needs to be rescheduled, due to Dr Johnson still not back to full days.  Pt can also leave her Se on 3/8 or can push that back too.      When pt calls back, please offer 4/17 at 8 AM or 4/24 at 3 PM.  If she wants to move her Se to April, ok to reschedule for any availability the week or two prior to her OV.  Se can not be on same day as OV.  She can also leaver her SE for 3/8 if she wishes.

## 2024-03-05 NOTE — TELEPHONE ENCOUNTER
Pt called and stated Tuesdays and Fridays are best for her scheduled.    Please call pt back [phone]

## 2024-03-06 NOTE — TELEPHONE ENCOUNTER
Pt called for a refill of her: zolpidem (AMBIEN) 5 mg tablet    Pt would like medication to go to:     [facility]/pharmacy #[id number] - [place] - [address]   [address] [address]   Phone:  [phone]  Fax:  [phone]

## 2024-03-06 NOTE — TELEPHONE ENCOUNTER
LVM for pt to reschedule appt.      When pt calls back, please offer 4/19 at 1:40 PM or 4/26 at 10:40 AM

## 2024-03-08 RX ORDER — ZOLPIDEM TARTRATE 5 MG/1
5 TABLET ORAL NIGHTLY PRN
Qty: 30 TABLET | Refills: 1 | Status: SHIPPED | OUTPATIENT
Start: 2024-03-08 | End: 2024-05-01 | Stop reason: SDUPTHER

## 2024-03-08 NOTE — TELEPHONE ENCOUNTER
zolpidem (AMBIEN) 5 mg tablet 5 mg, oral, Nightly PRN     Filled on 02/18/2024  # 60/30  PDMP checked without red flags    Last Office Visit: 2/22/2024   Last Consult Visit: 4/6/2021  Last Telemedicine Visit: 10/31/2023 Ruba Beavers MD    Next Appointment: Visit date not found

## 2024-03-11 ENCOUNTER — OFFICE VISIT (OUTPATIENT)
Dept: PRIMARY CARE | Facility: CLINIC | Age: 54
End: 2024-03-11
Payer: COMMERCIAL

## 2024-03-11 VITALS
BODY MASS INDEX: 34.73 KG/M2 | HEIGHT: 63 IN | DIASTOLIC BLOOD PRESSURE: 78 MMHG | TEMPERATURE: 99.9 F | HEART RATE: 82 BPM | OXYGEN SATURATION: 99 % | WEIGHT: 196 LBS | SYSTOLIC BLOOD PRESSURE: 130 MMHG

## 2024-03-11 DIAGNOSIS — J00 COLD VIRUS: Primary | ICD-10-CM

## 2024-03-11 DIAGNOSIS — J02.9 SORE THROAT: ICD-10-CM

## 2024-03-11 PROBLEM — E10.3599: Status: ACTIVE | Noted: 2024-01-02

## 2024-03-11 LAB
FLUAV RNA SPEC QL NAA+PROBE: NEGATIVE
FLUBV RNA SPEC QL NAA+PROBE: NEGATIVE
RSV RNA SPEC QL NAA+PROBE: POSITIVE
SARS-COV-2 RNA RESP QL NAA+PROBE: NEGATIVE

## 2024-03-11 PROCEDURE — 3008F BODY MASS INDEX DOCD: CPT | Performed by: NURSE PRACTITIONER

## 2024-03-11 PROCEDURE — 87637 SARSCOV2&INF A&B&RSV AMP PRB: CPT | Performed by: NURSE PRACTITIONER

## 2024-03-11 PROCEDURE — 99213 OFFICE O/P EST LOW 20 MIN: CPT | Performed by: NURSE PRACTITIONER

## 2024-03-11 ASSESSMENT — ENCOUNTER SYMPTOMS
APPETITE CHANGE: 0
APNEA: 0
CHOKING: 0
SORE THROAT: 1
FEVER: 1
ACTIVITY CHANGE: 0
STRIDOR: 0
PSYCHIATRIC NEGATIVE: 1
DIAPHORESIS: 0
HEADACHES: 0
LIGHT-HEADEDNESS: 0
COUGH: 0
CARDIOVASCULAR NEGATIVE: 1
SINUS PRESSURE: 0
WHEEZING: 1
RHINORRHEA: 0
FATIGUE: 0
SINUS PAIN: 0
UNEXPECTED WEIGHT CHANGE: 0
TROUBLE SWALLOWING: 0
CHILLS: 0
DIZZINESS: 0
CHEST TIGHTNESS: 0
SHORTNESS OF BREATH: 0

## 2024-03-11 NOTE — PROGRESS NOTES
a    [facility]   [address] [place] [address], [address]  [place] [address]  Phone: [phone]  Fax:[phone]        Subjective      Patient ID: Annette Montes De Oca is a 54 y.o. female.  1970      Cough congestion fever   Fever started yesterday but other symptoms started 5 days ago   No SOB, some wheezing on and off   Sore throat   No body aches, no chills   + fatigue     Using mucinex , no better         The following have been reviewed and updated as appropriate in this visit:   Allergies  Meds  Problems       Review of Systems   Constitutional: Positive for fever. Negative for activity change, appetite change, chills, diaphoresis, fatigue and unexpected weight change.   HENT: Positive for congestion and sore throat. Negative for ear discharge, ear pain, postnasal drip, rhinorrhea, sinus pressure, sinus pain and trouble swallowing.    Respiratory: Positive for wheezing. Negative for apnea, cough, choking, chest tightness, shortness of breath and stridor.    Cardiovascular: Negative.    Neurological: Negative for dizziness, light-headedness and headaches.   Psychiatric/Behavioral: Negative.      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 (CMS/HCC)   • NSTEMI (non-ST elevated myocardial infarction) (CMS/HCC)   • Cataract of right eye   • Family history of cerebrovascular accident (CVA)   • Pure hypercholesterolemia   • Left acute otitis media   • Current moderate episode of major depressive disorder without prior episode (CMS/HCC)   • Upper respiratory tract infection   • Urinary frequency   • Acute cystitis without hematuria   • Type 1 diabetes mellitus with proliferative retinopathy (CMS/HCC)   • Cold  "virus      Past Medical History:   Diagnosis Date   • Acute deep vein thrombosis (DVT) of brachial vein of right upper extremity (CMS/HCC) 8/28/2022   • Allergic    • Coronary artery disease    • Diabetes mellitus type I (CMS/HCC)    • Disease of thyroid gland    • Grand mal seizure (CMS/HCC)      Past Surgical History:   Procedure Laterality Date   • CARPAL TUNNEL RELEASE     • FOOT SURGERY     • HERNIA REPAIR     • MOUTH SURGERY     • OOPHORECTOMY     • SINUS SURGERY     • WISDOM TOOTH EXTRACTION       Social History     Socioeconomic History   • Marital status:      Spouse name: None   • Number of children: None   • Years of education: None   • Highest education level: None   Tobacco Use   • Smoking status: Never   • Smokeless tobacco: Never   Vaping Use   • Vaping Use: Never used   Substance and Sexual Activity   • Alcohol use: No   • Drug use: No   • Sexual activity: Defer   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  Current Outpatient Medications   Medication Sig Dispense Refill   • atorvastatin (LIPITOR) 20 mg tablet Take 1 tablet (20 mg total) by mouth daily. (Patient taking differently: Take 25 mg by mouth daily.) 90 tablet 3   • BD ULTRA-FINE SHORT PEN NEEDLE 31 gauge x 5/16\" needle 1 EACH DAILY. BD ULTRA FINE     • blood-glucose sensor (DEXCOM G6 SENSOR) device device 1 each by Other route.     • blood-glucose transmitter (DEXCOM G6 TRANSMITTER) device USE ONE TRANSMITTER WITH /SENSORS AND REPLACE EVERY 3 MONTHS     • buPROPion XL (WELLBUTRIN XL) 150 mg 24 hr tablet TAKE 1 TABLET BY MOUTH EVERY DAY 30 tablet 5   • CLINPRO 5000 1.1 % paste BRUSH TEETH FOR 1 MINUTE TWICE A DAY DO NOT RINSE, EAT OR DRINK FOR 30 MINS AFTER     • " dextromethorphan-guaifenesin (MUCINEX DM)  mg tablet extended release 12 hr Take 1 tablet by mouth every 12 (twelve) hours. Prn cough congestion ( one 1 tab q 12 hrs) 14 each 0   • escitalopram (LEXAPRO) 10 mg tablet TAKE 1 TABLET BY MOUTH EVERY DAY 90 tablet 1   • insulin aspart (NovoLOG) 100 unit/mL patient supplied pump Inject under the skin continuously.     • levothyroxine (SYNTHROID) 75 mcg tablet 1 TABLET ON AN EMPTY STOMACH IN THE MORNING  3   • loratadine (CLARITIN) 10 mg tablet TAKE 1 TABLET BY MOUTH EVERY DAY 30 tablet 2   • montelukast (SINGULAIR) 10 mg tablet TAKE 1 TABLET BY MOUTH EVERY DAY AT NIGHT 30 tablet 5   • zolpidem (AMBIEN) 5 mg tablet Take 1 tablet (5 mg total) by mouth nightly as needed for sleep. 30 tablet 1   • insulin glargine U-100 (LANTUS) 100 unit/mL injection Pt states she's on 42 units     • LANTUS SOLOSTAR U-100 INSULIN 100 unit/mL (3 mL) pen INJECT 44 UNITS UNDER THE SKIN NIGHTLY.     • mometasone (NASONEX) 50 mcg/actuation nasal spray Administer 2 sprays into each nostril daily. 17 g 1   • NovoLIN N NPH U-100 Insulin 100 unit/mL injection INJECT 40 UNITS SUBCUTANEOUS DAILY AT BEDTIME       No current facility-administered medications for this visit.       Physical Exam  Vitals and nursing note reviewed.   Constitutional:       Appearance: Normal appearance.   HENT:      Right Ear: Tympanic membrane, ear canal and external ear normal.      Left Ear: Tympanic membrane, ear canal and external ear normal.      Nose: Nose normal.      Mouth/Throat:      Mouth: Mucous membranes are moist.      Pharynx: Oropharynx is clear. Posterior oropharyngeal erythema present. No oropharyngeal exudate.   Cardiovascular:      Rate and Rhythm: Normal rate and regular rhythm.      Pulses: Normal pulses.      Heart sounds: Normal heart sounds.   Pulmonary:      Effort: Pulmonary effort is normal.      Breath sounds: Normal breath sounds.      Comments: Cough , dry   Musculoskeletal:      Cervical  back: Neck supple.   Skin:     General: Skin is warm and dry.   Neurological:      Mental Status: She is alert and oriented to person, place, and time. Mental status is at baseline.   Psychiatric:         Mood and Affect: Mood normal.         Behavior: Behavior normal.         Thought Content: Thought content normal.         Assessment/Plan     Diagnoses and all orders for this visit:    Cold virus (Primary)  Assessment & Plan:  Rapid strept neg   Viral swab to lab   Increase fluids   Rest   Tylenol cold and flu for body aches/fevers, cough   Nasal saline for congestion as needed   Contact us if you are no better or worse - strict ER precautions discussed    Orders:  -     SARS-COV-2 (COVID-19)/ FLU A/B, AND RSV, PCR [facility]    Sore throat         CLINTON Spencer   3/11/2024

## 2024-03-11 NOTE — PATIENT INSTRUCTIONS
Thank you for allowing me to participate in your care, it was a pleasure to care for you today. Should any questions or concerns arise, please call or message me on My Chart.     Rapid strept neg   Viral swab to lab   Increase fluids   Rest   Tylenol cold and flu for body aches/fevers, cough   Nasal saline for congestion as needed   Contact us if you are no better or worse - strict ER precautions discussed

## 2024-03-11 NOTE — ASSESSMENT & PLAN NOTE
Rapid strept neg   Viral swab to lab   Increase fluids   Rest   Tylenol cold and flu for body aches/fevers, cough   Nasal saline for congestion as needed   Contact us if you are no better or worse - strict ER precautions discussed

## 2024-03-12 ENCOUNTER — TELEPHONE (OUTPATIENT)
Dept: PRIMARY CARE | Facility: CLINIC | Age: 54
End: 2024-03-12

## 2024-03-12 NOTE — TELEPHONE ENCOUNTER
Pt called back eager to review results & recommendations as soon as possible. She does not use the portal.    [phone]

## 2024-03-12 NOTE — TELEPHONE ENCOUNTER
Patient called to speak with Ivone Chandler. She would like to go over her test results from yesterday. Please give the patient a callback to advise.

## 2024-03-17 DIAGNOSIS — F41.9 ANXIETY: ICD-10-CM

## 2024-03-18 RX ORDER — ESCITALOPRAM OXALATE 10 MG/1
10 TABLET ORAL DAILY
Qty: 30 TABLET | Refills: 5 | Status: SHIPPED | OUTPATIENT
Start: 2024-03-18 | End: 2024-08-13

## 2024-03-22 RX ORDER — TIZANIDINE 4 MG/1
4 TABLET ORAL EVERY 6 HOURS PRN
Qty: 30 TABLET | Refills: 0 | Status: SHIPPED | OUTPATIENT
Start: 2024-03-22 | End: 2024-04-19

## 2024-03-22 RX ORDER — BENZONATATE 100 MG/1
100 CAPSULE ORAL 3 TIMES DAILY PRN
Qty: 30 CAPSULE | Refills: 0 | Status: SHIPPED | OUTPATIENT
Start: 2024-03-22 | End: 2024-04-01

## 2024-03-22 NOTE — TELEPHONE ENCOUNTER
Call into service-  Ruba-  Calls were placed into call service- apparently last night. I did not get notified of this message until this morning at around 5 30am-so I apologized to Annette. Im not sure why this was the case.    Annette has had persistant cough from RSV over the course of past 10 days or so. Although cough is less/improving- still with fits of cough that are significant.  Yesterday afternoon developed Right sided rib pain from coughing- pain is very severe. She has tried heat and ibuprofen. At rest without cough feels pain but less and can be ok with heat applied- as soon as deep breath or cough of any kind pain becomes severe.It seems MSK in origin.  Offered appointment- she did not decline but sounds as if she wants to try the medication first. She may need xray at some point or PT/chiro for rib dysfunction. No back pain- did not seem to be spine compression fracture with radiation.  Sent Rx for tessalon to help suppress cough and tizanidine as a muscle relaxant.  .

## 2024-04-09 DIAGNOSIS — J30.89 OTHER ALLERGIC RHINITIS: ICD-10-CM

## 2024-04-09 RX ORDER — LORATADINE 10 MG/1
TABLET ORAL
Qty: 30 TABLET | Refills: 2 | Status: SHIPPED | OUTPATIENT
Start: 2024-04-09 | End: 2024-04-12

## 2024-04-10 ENCOUNTER — TELEPHONE (OUTPATIENT)
Dept: SCHEDULING | Facility: CLINIC | Age: 54
End: 2024-04-10

## 2024-04-10 NOTE — TELEPHONE ENCOUNTER
Called pt in regard to SE.  Pt called and rescheduled but now it is after OV with Dr Johnson. There is currently an opening for SE on 4/17 at 10:30 AM.  If pts current appt for her SE works better that is ok, Dr Johnson typically likes testing prior to OV, to review at visit but she can call and review it with pt after testing is completed.

## 2024-04-10 NOTE — TELEPHONE ENCOUNTER
Precert Auth Scheduling:     Name: Annette Montes De Oca    Auth Location: \[facility]\""    Auth Number: [id number]         Valid dates: 04/10/2024 - 07/08/2024    Procedure: Stress Echo    1. Scheduling notes: Please add in new referral order for stress echo. Thank you!

## 2024-04-12 ENCOUNTER — OFFICE VISIT (OUTPATIENT)
Dept: PRIMARY CARE | Facility: CLINIC | Age: 54
End: 2024-04-12
Payer: COMMERCIAL

## 2024-04-12 ENCOUNTER — HOSPITAL ENCOUNTER (OUTPATIENT)
Dept: RADIOLOGY | Facility: CLINIC | Age: 54
Discharge: HOME | End: 2024-04-12
Attending: NURSE PRACTITIONER
Payer: COMMERCIAL

## 2024-04-12 VITALS
OXYGEN SATURATION: 98 % | SYSTOLIC BLOOD PRESSURE: 130 MMHG | HEART RATE: 90 BPM | TEMPERATURE: 97.7 F | HEIGHT: 63 IN | BODY MASS INDEX: 34.55 KG/M2 | DIASTOLIC BLOOD PRESSURE: 74 MMHG | WEIGHT: 195 LBS

## 2024-04-12 DIAGNOSIS — J30.89 ALLERGIC RHINITIS DUE TO OTHER ALLERGIC TRIGGER, UNSPECIFIED SEASONALITY: ICD-10-CM

## 2024-04-12 DIAGNOSIS — R05.3 CHRONIC COUGH: Primary | ICD-10-CM

## 2024-04-12 DIAGNOSIS — R05.3 CHRONIC COUGH: ICD-10-CM

## 2024-04-12 PROBLEM — J30.9 ALLERGIC RHINITIS: Status: ACTIVE | Noted: 2024-04-12

## 2024-04-12 PROCEDURE — 3008F BODY MASS INDEX DOCD: CPT | Performed by: NURSE PRACTITIONER

## 2024-04-12 PROCEDURE — 99213 OFFICE O/P EST LOW 20 MIN: CPT | Performed by: NURSE PRACTITIONER

## 2024-04-12 PROCEDURE — 71046 X-RAY EXAM CHEST 2 VIEWS: CPT

## 2024-04-12 RX ORDER — MINERAL OIL
180 ENEMA (ML) RECTAL DAILY
Qty: 90 TABLET | Refills: 1 | Status: SHIPPED | OUTPATIENT
Start: 2024-04-12 | End: 2024-09-17

## 2024-04-12 RX ORDER — AZELASTINE 1 MG/ML
2 SPRAY, METERED NASAL 2 TIMES DAILY
Qty: 120 ML | Refills: 0 | Status: SHIPPED | OUTPATIENT
Start: 2024-04-12 | End: 2024-07-11

## 2024-04-12 ASSESSMENT — ENCOUNTER SYMPTOMS
SORE THROAT: 0
COUGH: 1
SHORTNESS OF BREATH: 0
TROUBLE SWALLOWING: 0
CARDIOVASCULAR NEGATIVE: 1
WHEEZING: 0
PSYCHIATRIC NEGATIVE: 1
CHOKING: 0
HEADACHES: 0
SINUS PRESSURE: 0
STRIDOR: 0
CHEST TIGHTNESS: 0
DIZZINESS: 0
RHINORRHEA: 0
CONSTITUTIONAL NEGATIVE: 1
APNEA: 0
SINUS PAIN: 0
LIGHT-HEADEDNESS: 0

## 2024-04-12 ASSESSMENT — PATIENT HEALTH QUESTIONNAIRE - PHQ9: SUM OF ALL RESPONSES TO PHQ9 QUESTIONS 1 & 2: 0

## 2024-04-12 NOTE — PROGRESS NOTES
[facility]   [address] [place] [address], [address]  [place] [address]  Phone: [phone]  Fax:[phone]        Subjective      Patient ID: Annette Montes De Oca is a 54 y.o. female.  1970      Seasonal allergies right now - needs an allergist to be tested   Had sinus surgery in past   B/l ear pressure , no pain   Runny nose   Not sure if cough is from allergies or since she had RSV in march , no SOB or wheeze   No fever, chills or body aches     Taking nasonex , claritin and singulair right now   Feels like they are not working             The following have been reviewed and updated as appropriate in this visit:   Allergies  Meds  Problems       Review of Systems   Constitutional: Negative.    HENT:  Negative for congestion, ear discharge, ear pain, postnasal drip, rhinorrhea, sinus pressure, sinus pain, sore throat and trouble swallowing.         B/l ear pressure    Respiratory:  Positive for cough. Negative for apnea, choking, chest tightness, shortness of breath, wheezing and stridor.    Cardiovascular: Negative.    Neurological:  Negative for dizziness, light-headedness and headaches.   Psychiatric/Behavioral: Negative.       Patient Active Problem List   Diagnosis    Anxiety    Hypothyroid    Iron deficiency anemia    Paronychia of toe of left foot    Dysuria    Seasonal allergies    Type 1 diabetes mellitus with hyperglycemia (CMS/HCC)    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 1 diabetes mellitus (CMS/HCC)    NSTEMI (non-ST elevated myocardial infarction) (CMS/HCC)    Cataract of right eye    Family history of cerebrovascular accident (CVA)    Pure hypercholesterolemia    Left acute otitis media    Current moderate episode of major depressive disorder without prior episode (CMS/HCC)    Upper respiratory tract infection    Urinary frequency    Acute cystitis without hematuria    Type 1 diabetes mellitus with proliferative retinopathy  "(CMS/[facility])    Cold virus    Allergic rhinitis    Chronic cough      Past Medical History:   Diagnosis Date    Acute deep vein thrombosis (DVT) of brachial vein of right upper extremity (CMS/[facility]) 8/28/2022    Allergic     Coronary artery disease     Diabetes mellitus type I (CMS/[facility])     Disease of thyroid gland     Grand mal seizure (CMS/[facility])      Past Surgical History:   Procedure Laterality Date    CARPAL TUNNEL RELEASE      FOOT SURGERY      HERNIA REPAIR      MOUTH SURGERY      OOPHORECTOMY      SINUS SURGERY      WISDOM TOOTH EXTRACTION       Social History     Socioeconomic History    Marital status:      Spouse name: None    Number of children: None    Years of education: None    Highest education level: None   Tobacco Use    Smoking status: Never    Smokeless tobacco: Never   Vaping Use    Vaping Use: Never used   Substance and Sexual Activity    Alcohol use: No    Drug use: No    Sexual activity: Defer   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  Current Outpatient Medications   Medication Sig Dispense Refill    atorvastatin (LIPITOR) 20 mg tablet Take 1 tablet (20 mg total) by mouth daily. (Patient taking differently: Take 25 mg by mouth daily.) 90 tablet 3    azelastine (ASTELIN) 137 mcg (0.1 %) nasal spray Administer 2 sprays into each nostril 2 (two) times a day. Use in each nostril as directed. 120 mL 0    BD ULTRA-FINE SHORT PEN NEEDLE 31 gauge x 5/16\" needle 1 EACH DAILY. BD ULTRA FINE      blood-glucose sensor (DEXCOM G6 SENSOR) device device 1 each by Other route.      blood-glucose transmitter (DEXCOM G6 TRANSMITTER) device USE ONE TRANSMITTER WITH /SENSORS AND REPLACE EVERY 3 MONTHS      buPROPion XL (WELLBUTRIN XL) 150 mg 24 hr tablet " TAKE 1 TABLET BY MOUTH EVERY DAY 30 tablet 5    CLINPRO 5000 1.1 % paste BRUSH TEETH FOR 1 MINUTE TWICE A DAY DO NOT RINSE, EAT OR DRINK FOR 30 MINS AFTER      dextromethorphan-guaifenesin (MUCINEX DM)  mg tablet extended release 12 hr Take 1 tablet by mouth every 12 (twelve) hours. Prn cough congestion ( one 1 tab q 12 hrs) 14 each 0    escitalopram (LEXAPRO) 10 mg tablet TAKE 1 TABLET BY MOUTH EVERY DAY 30 tablet 5    fexofenadine (ALLEGRA) 180 mg tablet Take 1 tablet (180 mg total) by mouth daily. 90 tablet 1    insulin aspart (NovoLOG) 100 unit/mL patient supplied pump Inject under the skin continuously.      levothyroxine (SYNTHROID) 75 mcg tablet 1 TABLET ON AN EMPTY STOMACH IN THE MORNING  3    montelukast (SINGULAIR) 10 mg tablet TAKE 1 TABLET BY MOUTH EVERY DAY AT NIGHT 30 tablet 5    zolpidem (AMBIEN) 5 mg tablet Take 1 tablet (5 mg total) by mouth nightly as needed for sleep. 30 tablet 1    insulin glargine U-100 (LANTUS) 100 unit/mL injection Pt states she's on 42 units      LANTUS SOLOSTAR U-100 INSULIN 100 unit/mL (3 mL) pen INJECT 44 UNITS UNDER THE SKIN NIGHTLY.      NovoLIN N NPH U-100 Insulin 100 unit/mL injection INJECT 40 UNITS SUBCUTANEOUS DAILY AT BEDTIME       No current facility-administered medications for this visit.       Physical Exam  Vitals and nursing note reviewed.   Constitutional:       Appearance: Normal appearance. She is normal weight.   HENT:      Right Ear: Tympanic membrane, ear canal and external ear normal.      Left Ear: Tympanic membrane, ear canal and external ear normal.      Nose: Rhinorrhea present. No congestion.      Mouth/Throat:      Mouth: Mucous membranes are moist.      Pharynx: Oropharynx is clear.   Cardiovascular:      Rate and Rhythm: Normal rate and regular rhythm.      Pulses: Normal pulses.      Heart sounds: Normal heart sounds.   Pulmonary:      Effort: Pulmonary effort is normal.      Breath sounds: Normal breath sounds.   Musculoskeletal:       Cervical back: Neck supple.   Skin:     General: Skin is warm and dry.   Neurological:      Mental Status: She is alert and oriented to person, place, and time. Mental status is at baseline.   Psychiatric:         Mood and Affect: Mood normal.         Behavior: Behavior normal.         Thought Content: Thought content normal.       Assessment/Plan     Diagnoses and all orders for this visit:    Chronic cough (Primary)  Assessment & Plan:    CXR today     Orders:  -     X-RAY CHEST 2 VIEWS; Future    Allergic rhinitis due to other allergic trigger, unspecified seasonality  Assessment & Plan:  Change nasonex to astelin   Change claritin to allegra daily   See allergy and ashtma for testing         Other orders  -     fexofenadine (ALLEGRA) 180 mg tablet; Take 1 tablet (180 mg total) by mouth daily.  -     azelastine (ASTELIN) 137 mcg (0.1 %) nasal spray; Administer 2 sprays into each nostril 2 (two) times a day. Use in each nostril as directed.           CLINTON Spencer   4/12/2024

## 2024-04-12 NOTE — ASSESSMENT & PLAN NOTE
Change nasonex to astelin   Change claritin to allegra daily   See allergy and ashtma for testing

## 2024-04-12 NOTE — PATIENT INSTRUCTIONS
Thank you for allowing me to participate in your care, it was a pleasure to care for you today. Should any questions or concerns arise, please call or message me on My Chart.   Change nasonex to astelin   Change claritin to allegra daily   See allergy and ashtma for testing    CXR today   [facility]       LINCOLN; BETTY; VERONICA; HELEN; EDE ALVES; BRIT; DEMETRIS; CHRISSY;       CAMILLE; JOAQUIM; SHARON; CLINTON WALKER       [address] [place] [address], [address] [place], [place]  [phone]

## 2024-04-15 ENCOUNTER — TELEPHONE (OUTPATIENT)
Dept: PRIMARY CARE | Facility: CLINIC | Age: 54
End: 2024-04-15
Payer: COMMERCIAL

## 2024-04-15 NOTE — TELEPHONE ENCOUNTER
Patient LVM to ask for advice on taking medication. She saw Ivone on 04/12/2024 and was prescribed new allergy medications, she would like to know if she should continue taking the Singulair medication in addition to the new allergy medications that were prescribed.    Please advise

## 2024-04-16 ENCOUNTER — TELEPHONE (OUTPATIENT)
Dept: PRIMARY CARE | Facility: CLINIC | Age: 54
End: 2024-04-16
Payer: COMMERCIAL

## 2024-04-16 RX ORDER — MONTELUKAST SODIUM 10 MG/1
TABLET ORAL
Qty: 30 TABLET | Refills: 4 | Status: SHIPPED | OUTPATIENT
Start: 2024-04-16 | End: 2024-09-09

## 2024-04-16 NOTE — TELEPHONE ENCOUNTER
LVM to RC to discuss taking the azelastine (ASTELIN) 137 mcg (0.1 %) nasal spray, montelukast (SINGULAIR) 10 mg tablet  and the fexofenadine (ALLEGRA) 180 mg tablet is fine to take as prescribed.

## 2024-04-16 NOTE — TELEPHONE ENCOUNTER
Ms Tavon and I went over her medications. Went over the directions and the times of day to take. No further questions or concerns at this time.

## 2024-04-17 NOTE — TELEPHONE ENCOUNTER
Milind Montes De Oca regarding the medications,frequency, and timing of the day when to take for effectiveness. She is without concerns at this time.

## 2024-04-19 ENCOUNTER — OFFICE VISIT (OUTPATIENT)
Dept: CARDIOLOGY | Facility: CLINIC | Age: 54
End: 2024-04-19
Payer: COMMERCIAL

## 2024-04-19 VITALS
SYSTOLIC BLOOD PRESSURE: 118 MMHG | HEART RATE: 83 BPM | HEIGHT: 63 IN | OXYGEN SATURATION: 98 % | BODY MASS INDEX: 34.55 KG/M2 | WEIGHT: 195 LBS | RESPIRATION RATE: 18 BRPM | DIASTOLIC BLOOD PRESSURE: 60 MMHG

## 2024-04-19 DIAGNOSIS — I21.4 NSTEMI (NON-ST ELEVATED MYOCARDIAL INFARCTION) (CMS/HCC): Primary | ICD-10-CM

## 2024-04-19 DIAGNOSIS — E10.3599 TYPE 1 DIABETES MELLITUS WITH PROLIFERATIVE RETINOPATHY, MACULAR EDEMA PRESENCE UNSPECIFIED, UNSPECIFIED LATERALITY, UNSPECIFIED PROLIFERATIVE RETINOPATHY TYPE (CMS/HCC): ICD-10-CM

## 2024-04-19 DIAGNOSIS — R93.1 ELEVATED CORONARY ARTERY CALCIUM SCORE: ICD-10-CM

## 2024-04-19 DIAGNOSIS — E78.00 PURE HYPERCHOLESTEROLEMIA: ICD-10-CM

## 2024-04-19 LAB
ATRIAL RATE: 79
P AXIS: 62
PR INTERVAL: 134
QRS DURATION: 80
QT INTERVAL: 360
QTC CALCULATION(BAZETT): 412
R AXIS: -12
T WAVE AXIS: 28
VENTRICULAR RATE: 79

## 2024-04-19 PROCEDURE — 99214 OFFICE O/P EST MOD 30 MIN: CPT | Performed by: INTERNAL MEDICINE

## 2024-04-19 PROCEDURE — 93000 ELECTROCARDIOGRAM COMPLETE: CPT | Performed by: INTERNAL MEDICINE

## 2024-04-19 PROCEDURE — 3008F BODY MASS INDEX DOCD: CPT | Performed by: INTERNAL MEDICINE

## 2024-04-19 RX ORDER — ASPIRIN 81 MG/1
81 TABLET ORAL DAILY
COMMUNITY

## 2024-04-19 NOTE — LETTER
April 20, 2024     Ruba Beavers MD  [address]  [facility] [address]    Patient: Annette Montes De Oca  YOB: 1970  Date of Visit: 4/19/2024      Dear Dr. Beavers:    Thank you for referring Annette Montes De Oca to me for evaluation. Below are my notes for this consultation.    If you have questions, please do not hesitate to call me. I look forward to following your patient along with you.         Sincerely,        Diamante Johnson MD        CC: No Recipients    Diamante Johnson MD  4/20/2024  6:35 PM  Sign when Signing Visit       Diamante Johnson MD       Reason for visit : Follow up  HPI  Annette Montes De Oca is a 54 y.o. female who presents to the office for cardiovascular follow up.        Dear Ruba,    On April 19, 2024 I saw Annette Montes De Oca in the office for cardiovascular follow-up.  She is now 54 years old.  She has type 1 diabetes with retinopathy, hypothyroidism, hyperlipidemia and anemia.  In August 2022 she was hospitalized for DKA and was told that she had a type II 9 ST segment elevation myocardial infarction.  At that time she developed a DVT of her upper extremity.    In November 2022 she had a coronary artery CTA which showed a total calcium score of 110 with most of it being in the right coronary artery.  Her disease was found to be nonobstructive.  She had carotid ultrasounds which did not show any stenosis or plaque.  An echocardiogram done at that time showed normal left ventricular function with an estimated ejection fraction of 60 to 65% and grade 1 diastolic dysfunction.  She is now here for reevaluation.    She is in need of cataract surgery.  She tells me that in general she has done well.  She is now on an insulin pump and her blood sugars are doing much better.  She had a significant RSV infection in March which causes significant shortness of breath.  She was coughing so much that she actually fractured 2 ribs.  Her shortness of breath is completely  gone, but unfortunately she has not really been doing much in the way of physical activity.  She can do a flight of stairs without difficulty.  She denies chest pain, palpitations, lightheadedness, syncope or peripheral edema.    Her last cholesterol done in December 2023 was 130 with an LDL of 70.    I have reviewed her medications.  Aside from the above-mentioned there are no new medical or surgical issues.  The rest of her review of systems is completely negative           Problem List:  2024-04: Allergic rhinitis  2024-04: Chronic cough  2024-03: Cold virus  2024-01: Type 1 diabetes mellitus with proliferative retinopathy (CMS/  [facility])  2023-10: Acute cystitis without hematuria  2023-05: Urinary frequency  2023-05: Upper respiratory tract infection  2023-04: Current moderate episode of major depressive disorder   without prior episode (CMS/[facility])  2023-02: Left acute otitis media  2022-11: Cellulitis of abdominal wall  2022-10: Acute non-recurrent maxillary sinusitis  2022-10: Pure hypercholesterolemia  2022-09: Family history of cerebrovascular accident (CVA)  2022-09: Cataract of right eye  2022-08: Acute deep vein thrombosis (DVT) of brachial vein of right   upper extremity (CMS/[facility])  2022-08: NSTEMI (non-ST elevated myocardial infarction) (CMS/[facility])  2022-08: Diabetic ketoacidosis without coma associated with type 1   diabetes mellitus (CMS/[facility])  2022-08: Syncope  2022-07: Right acute otitis media  2021-04: Type 1 diabetes mellitus with hyperglycemia (CMS/[facility])  2021-04: Stable proliferative diabetic retinopathy of both eyes   associated with type 1 diabetes mellitus (CMS/[facility])  2020-10: Seasonal allergies  2019-07: Dysuria  2019-01: Paronychia of toe of left foot  2018-09: Abdominal wall abscess  2015-05: Anxiety  2015-05: Hypothyroid  2015-05: Iron deficiency anemia  2015-05: Type 1 diabetes mellitus (CMS/[facility])           Current Outpatient Medications   Medication Sig   • aspirin 81 mg enteric coated tablet Take  "81 mg by mouth daily. 1/2 tablet   • atorvastatin (LIPITOR) 20 mg tablet Take 1 tablet (20 mg total) by mouth daily.   • azelastine (ASTELIN) 137 mcg (0.1 %) nasal spray Administer 2 sprays into each nostril 2 (two) times a day. Use in each nostril as directed.   • buPROPion XL (WELLBUTRIN XL) 150 mg 24 hr tablet TAKE 1 TABLET BY MOUTH EVERY DAY   • carboxymethylcellulose sodium (ARTIFICIAL TEARS, CMC, OPHT) Administer into affected eye(s).   • dextromethorphan-guaifenesin (MUCINEX DM)  mg tablet extended release 12 hr Take 1 tablet by mouth every 12 (twelve) hours. Prn cough congestion ( one 1 tab q 12 hrs)   • escitalopram (LEXAPRO) 10 mg tablet TAKE 1 TABLET BY MOUTH EVERY DAY   • fexofenadine (ALLEGRA) 180 mg tablet Take 1 tablet (180 mg total) by mouth daily.   • insulin aspart (NovoLOG) 100 unit/mL patient supplied pump Inject under the skin continuously.   • levothyroxine (SYNTHROID) 75 mcg tablet 1 TABLET ON AN EMPTY STOMACH IN THE MORNING   • mometasone furoate (NASONEX NASL) Administer into affected nostril(s).   • montelukast (SINGULAIR) 10 mg tablet TAKE 1 TABLET BY MOUTH EVERY DAY AT NIGHT   • zolpidem (AMBIEN) 5 mg tablet Take 1 tablet (5 mg total) by mouth nightly as needed for sleep.   • BD ULTRA-FINE SHORT PEN NEEDLE 31 gauge x 5/16\" needle 1 EACH DAILY. BD ULTRA FINE   • blood-glucose sensor (DEXCOM G6 SENSOR) device device 1 each by Other route.   • blood-glucose transmitter (DEXCOM G6 TRANSMITTER) device USE ONE TRANSMITTER WITH /SENSORS AND REPLACE EVERY 3 MONTHS   • NovoLIN N NPH U-100 Insulin 100 unit/mL injection INJECT 40 UNITS SUBCUTANEOUS DAILY AT BEDTIME     No current facility-administered medications for this visit.          Allergies:  Codeine, Cortisone, Oxycodone, and Penicillins    Surgical History:  Past Surgical History:   Procedure Laterality Date   • CARPAL TUNNEL RELEASE     • FOOT SURGERY     • HERNIA REPAIR     • MOUTH SURGERY     • OOPHORECTOMY     • SINUS " "SURGERY     • TOOTH EXTRACTION     • WISDOM TOOTH EXTRACTION          Family History:  Family History   Problem Relation Age of Onset   • Stroke Biological Brother    • Heart disease Maternal Grandmother    • Kidney disease Maternal Grandmother    • Prostate cancer Paternal Grandmother         Social History:  Social History     Socioeconomic History   • Marital status:      Spouse name: None   • Number of children: None   • Years of education: None   • Highest education level: None   Tobacco Use   • Smoking status: Never   • Smokeless tobacco: Never   Vaping Use   • Vaping Use: Never used   Substance and Sexual Activity   • Alcohol use: No   • Drug use: No   • Sexual activity: Defer   Social History Narrative    [employer]           Review of Systems  The rest of her review of systems is completely negative          Objective  Vitals:    04/19/24 1341   BP: 118/60   BP Location: Left upper arm   Patient Position: Sitting   Pulse: 83   Resp: 18   SpO2: 98%   Weight: 88.5 kg (195 lb)   Height: 1.6 m (5' 3\")     Physical Exam  Blood pressure is 118/60.  Heart rate is 83 and regular.  She is comfortable.  Skin is warm and dry.  Conjunctiva are pink.  Affect is appropriate.  No JVD or HJR.  Carotids are unremarkable.  Chest is clear.  Regular rhythm.  Normal S1 and S2.  Heart tones are somewhat distant.  No murmurs or gallops.  Abdomen is soft with good bowel sounds.  No organomegaly.  No clubbing, cyanosis, or edema.  I felt no distal pulses.  No rash.  No obvious musculoskeletal deformities.       Labs:   Lab Results   Component Value Date    WBC 5.6 10/06/2017    HGB 14.3 10/06/2017    HCT 41.9 10/06/2017     (H) 10/06/2017    CHOL 129 12/29/2023    TRIG 108 12/29/2023    HDL 40 12/29/2023    LDLCALC 69 12/29/2023    ALT 25 12/29/2023    AST 17 12/29/2023     12/29/2023    K 5.6 (H) 12/29/2023     12/29/2023    CREATININE 1.10 (H) 12/29/2023    BUN 7 12/29/2023    CO2 25 12/29/2023    TSH " 1.810 07/08/2022    HGBA1C 8.0 (H) 12/29/2023       ECG :  Her EKG is within normal limits.    Cardiac Imaging    TRANSTHORACIC ECHO (TTE) COMPLETE 11/15/2022    Interpretation Summary  1.  Normal left ventricular function with an estimated ejection fraction of 60 to 65%.  2.  No regional wall motion abnormalities.  3.  Grade 1 diastolic dysfunction.  4.  No significant valve disease             Problem List Items Addressed This Visit          Circulatory    NSTEMI (non-ST elevated myocardial infarction) (CMS/[facility]) - Primary    Relevant Medications    aspirin 81 mg enteric coated tablet    Other Relevant Orders    [facility] MUSE ECG 12 lead (clinic performed) (Completed)       Endocrine/Metabolic    Type 1 diabetes mellitus with proliferative retinopathy (CMS/[facility])    Relevant Medications    carboxymethylcellulose sodium (ARTIFICIAL TEARS, CMC, OPHT)       Other    Pure hypercholesterolemia    Relevant Orders    Hepatic function panel    Lipid panel     Other Visit Diagnoses       Elevated coronary artery calcium score        Relevant Medications    aspirin 81 mg enteric coated tablet    Other Relevant Orders    Echocardiogram stress test            Impression:    From a cardiac standpoint Annette is stable.  She has no angina.  Her stress test is pending for the beginning of May.  I would place her given her normal EKG, normal physical examination and lack of symptoms at low risk for cataract surgery.    Her last LDL is excellent.  This will be repeated in December 2024.  If all goes well I will see her again in 2 years with a stress echo prior to reassess her for ischemia    I thank you for allowing me to participate in the care of your patient.  If I can furnish you with any further details, please do not hesitate to contact me.     Diamante Johnson MD  4/20/2024    This document was generated utilizing voice recognition technology. A reasonable attempt at proofreading has been made to minimize errors but please  excuse any typographical errors which may be present. Please call with any questions.

## 2024-04-19 NOTE — PROGRESS NOTES
Diamante Johnson MD       Reason for visit : Follow up  HPI   Annette Montes De Oca is a 54 y.o. female who presents to the office for cardiovascular follow up.        Dear Ruba,    On April 19, 2024 I saw Annette Montes DeO ca in the office for cardiovascular follow-up.  She is now 54 years old.  She has type 1 diabetes with retinopathy, hypothyroidism, hyperlipidemia and anemia.  In August 2022 she was hospitalized for DKA and was told that she had a type II 9 ST segment elevation myocardial infarction.  At that time she developed a DVT of her upper extremity.    In November 2022 she had a coronary artery CTA which showed a total calcium score of 110 with most of it being in the right coronary artery.  Her disease was found to be nonobstructive.  She had carotid ultrasounds which did not show any stenosis or plaque.  An echocardiogram done at that time showed normal left ventricular function with an estimated ejection fraction of 60 to 65% and grade 1 diastolic dysfunction.  She is now here for reevaluation.    She is in need of cataract surgery.  She tells me that in general she has done well.  She is now on an insulin pump and her blood sugars are doing much better.  She had a significant RSV infection in March which causes significant shortness of breath.  She was coughing so much that she actually fractured 2 ribs.  Her shortness of breath is completely gone, but unfortunately she has not really been doing much in the way of physical activity.  She can do a flight of stairs without difficulty.  She denies chest pain, palpitations, lightheadedness, syncope or peripheral edema.    Her last cholesterol done in December 2023 was 130 with an LDL of 70.    I have reviewed her medications.  Aside from the above-mentioned there are no new medical or surgical issues.  The rest of her review of systems is completely negative           Problem List:  2024-04: Allergic rhinitis  2024-04: Chronic cough  2024-03: Cold  virus  2024-01: Type 1 diabetes mellitus with proliferative retinopathy (CMS/  HCC)  2023-10: Acute cystitis without hematuria  2023-05: Urinary frequency  2023-05: Upper respiratory tract infection  2023-04: Current moderate episode of major depressive disorder   without prior episode (CMS/[facility])  2023-02: Left acute otitis media  2022-11: Cellulitis of abdominal wall  2022-10: Acute non-recurrent maxillary sinusitis  2022-10: Pure hypercholesterolemia  2022-09: Family history of cerebrovascular accident (CVA)  2022-09: Cataract of right eye  2022-08: Acute deep vein thrombosis (DVT) of brachial vein of right   upper extremity (CMS/HCC)  2022-08: NSTEMI (non-ST elevated myocardial infarction) (CMS/HCC)  2022-08: Diabetic ketoacidosis without coma associated with type 1   diabetes mellitus (CMS/HCC)  2022-08: Syncope  2022-07: Right acute otitis media  2021-04: Type 1 diabetes mellitus with hyperglycemia (CMS/HCC)  2021-04: Stable proliferative diabetic retinopathy of both eyes   associated with type 1 diabetes mellitus (CMS/[facility])  2020-10: Seasonal allergies  2019-07: Dysuria  2019-01: Paronychia of toe of left foot  2018-09: Abdominal wall abscess  2015-05: Anxiety  2015-05: Hypothyroid  2015-05: Iron deficiency anemia  2015-05: Type 1 diabetes mellitus (CMS/[facility])           Current Outpatient Medications   Medication Sig    aspirin 81 mg enteric coated tablet Take 81 mg by mouth daily. 1/2 tablet    atorvastatin (LIPITOR) 20 mg tablet Take 1 tablet (20 mg total) by mouth daily.    azelastine (ASTELIN) 137 mcg (0.1 %) nasal spray Administer 2 sprays into each nostril 2 (two) times a day. Use in each nostril as directed.    buPROPion XL (WELLBUTRIN XL) 150 mg 24 hr tablet TAKE 1 TABLET BY MOUTH EVERY DAY    carboxymethylcellulose sodium (ARTIFICIAL TEARS, CMC, OPHT) Administer into affected eye(s).    dextromethorphan-guaifenesin (MUCINEX DM)  mg tablet extended release 12 hr Take 1 tablet by mouth every 12  "(twelve) hours. Prn cough congestion ( one 1 tab q 12 hrs)    escitalopram (LEXAPRO) 10 mg tablet TAKE 1 TABLET BY MOUTH EVERY DAY    fexofenadine (ALLEGRA) 180 mg tablet Take 1 tablet (180 mg total) by mouth daily.    insulin aspart (NovoLOG) 100 unit/mL patient supplied pump Inject under the skin continuously.    levothyroxine (SYNTHROID) 75 mcg tablet 1 TABLET ON AN EMPTY STOMACH IN THE MORNING    mometasone furoate (NASONEX NASL) Administer into affected nostril(s).    montelukast (SINGULAIR) 10 mg tablet TAKE 1 TABLET BY MOUTH EVERY DAY AT NIGHT    zolpidem (AMBIEN) 5 mg tablet Take 1 tablet (5 mg total) by mouth nightly as needed for sleep.    BD ULTRA-FINE SHORT PEN NEEDLE 31 gauge x 5/16\" needle 1 EACH DAILY. BD ULTRA FINE    blood-glucose sensor (DEXCOM G6 SENSOR) device device 1 each by Other route.    blood-glucose transmitter (DEXCOM G6 TRANSMITTER) device USE ONE TRANSMITTER WITH /SENSORS AND REPLACE EVERY 3 MONTHS    NovoLIN N NPH U-100 Insulin 100 unit/mL injection INJECT 40 UNITS SUBCUTANEOUS DAILY AT BEDTIME     No current facility-administered medications for this visit.          Allergies:  Codeine, Cortisone, Oxycodone, and Penicillins    Surgical History:  Past Surgical History:   Procedure Laterality Date    CARPAL TUNNEL RELEASE      FOOT SURGERY      HERNIA REPAIR      MOUTH SURGERY      OOPHORECTOMY      SINUS SURGERY      TOOTH EXTRACTION      WISDOM TOOTH EXTRACTION          Family History:  Family History   Problem Relation Age of Onset    Stroke Biological Brother     Heart disease Maternal Grandmother     Kidney disease Maternal Grandmother     Prostate cancer Paternal Grandmother         Social History:  Social History     Socioeconomic History    Marital status:      Spouse name: None    Number of children: None    Years of education: None    Highest education level: None   Tobacco Use    Smoking status: Never    Smokeless tobacco: Never   Vaping Use    Vaping Use: " "Never used   Substance and Sexual Activity    Alcohol use: No    Drug use: No    Sexual activity: Defer   Social History Narrative    Raf petit           Review of Systems  The rest of her review of systems is completely negative          Objective   Vitals:    04/19/24 1341   BP: 118/60   BP Location: Left upper arm   Patient Position: Sitting   Pulse: 83   Resp: 18   SpO2: 98%   Weight: 88.5 kg (195 lb)   Height: 1.6 m (5' 3\")     Physical Exam  Blood pressure is 118/60.  Heart rate is 83 and regular.  She is comfortable.  Skin is warm and dry.  Conjunctiva are pink.  Affect is appropriate.  No JVD or HJR.  Carotids are unremarkable.  Chest is clear.  Regular rhythm.  Normal S1 and S2.  Heart tones are somewhat distant.  No murmurs or gallops.  Abdomen is soft with good bowel sounds.  No organomegaly.  No clubbing, cyanosis, or edema.  I felt no distal pulses.  No rash.  No obvious musculoskeletal deformities.       Labs:   Lab Results   Component Value Date    WBC 5.6 10/06/2017    HGB 14.3 10/06/2017    HCT 41.9 10/06/2017     (H) 10/06/2017    CHOL 129 12/29/2023    TRIG 108 12/29/2023    HDL 40 12/29/2023    LDLCALC 69 12/29/2023    ALT 25 12/29/2023    AST 17 12/29/2023     12/29/2023    K 5.6 (H) 12/29/2023     12/29/2023    CREATININE 1.10 (H) 12/29/2023    BUN 7 12/29/2023    CO2 25 12/29/2023    TSH 1.810 07/08/2022    HGBA1C 8.0 (H) 12/29/2023       ECG :  Her EKG is within normal limits.    Cardiac Imaging    TRANSTHORACIC ECHO (TTE) COMPLETE 11/15/2022    Interpretation Summary  1.  Normal left ventricular function with an estimated ejection fraction of 60 to 65%.  2.  No regional wall motion abnormalities.  3.  Grade 1 diastolic dysfunction.  4.  No significant valve disease             Problem List Items Addressed This Visit          Circulatory    NSTEMI (non-ST elevated myocardial infarction) (CMS/HCC) - Primary    Relevant Medications    aspirin 81 mg enteric coated tablet    " Other Relevant Orders    [facility] MUSE ECG 12 lead (clinic performed) (Completed)       Endocrine/Metabolic    Type 1 diabetes mellitus with proliferative retinopathy (CMS/HCC)    Relevant Medications    carboxymethylcellulose sodium (ARTIFICIAL TEARS, CMC, OPHT)       Other    Pure hypercholesterolemia    Relevant Orders    Hepatic function panel    Lipid panel     Other Visit Diagnoses       Elevated coronary artery calcium score        Relevant Medications    aspirin 81 mg enteric coated tablet    Other Relevant Orders    Echocardiogram stress test            Impression:    From a cardiac standpoint Annette is stable.  She has no angina.  Her stress test is pending for the beginning of May.  I would place her given her normal EKG, normal physical examination and lack of symptoms at low risk for cataract surgery.    Her last LDL is excellent.  This will be repeated in December 2024.  If all goes well I will see her again in 2 years with a stress echo prior to reassess her for ischemia    I thank you for allowing me to participate in the care of your patient.  If I can furnish you with any further details, please do not hesitate to contact me.     Diamante Johnson MD  4/20/2024    This document was generated utilizing voice recognition technology. A reasonable attempt at proofreading has been made to minimize errors but please excuse any typographical errors which may be present. Please call with any questions.

## 2024-04-22 ENCOUNTER — TELEPHONE (OUTPATIENT)
Dept: CARDIOLOGY | Facility: CLINIC | Age: 54
End: 2024-04-22
Payer: COMMERCIAL

## 2024-04-25 ENCOUNTER — TELEPHONE (OUTPATIENT)
Dept: SCHEDULING | Facility: CLINIC | Age: 54
End: 2024-04-25
Payer: COMMERCIAL

## 2024-04-25 NOTE — TELEPHONE ENCOUNTER
Cardiac Clearance     Name of caller: Annette Montes De Oca    Relationship to patient: self     Name of patient: Annette Montes De Oca    Insurance Name: [facility]     Name of physician: Diamnate Johnson MD    Date of Procedure/Surgery: 08/30     Type of Procedure/Surgery: RT eye     Name of surgeon: Ruben Dale     Office contact number: [phone]    Office fax number: [phone]    Additional notes: pt is request to fax over EKG done last week with [facility]     Addendums  Is patient able to be cleared based on last appt on 04/19  If unable to clear patient, please contact patient at [phone]

## 2024-05-01 ENCOUNTER — TELEPHONE (OUTPATIENT)
Dept: CARDIOLOGY | Facility: CLINIC | Age: 54
End: 2024-05-01
Payer: COMMERCIAL

## 2024-05-01 DIAGNOSIS — E78.00 PURE HYPERCHOLESTEROLEMIA: Primary | ICD-10-CM

## 2024-05-01 LAB
ALBUMIN SERPL-MCNC: 3.9 G/DL (ref 3.8–4.9)
ALP SERPL-CCNC: 87 IU/L (ref 44–121)
ALT SERPL-CCNC: 11 IU/L (ref 0–32)
AST SERPL-CCNC: 14 IU/L (ref 0–40)
BILIRUB DIRECT SERPL-MCNC: 0.13 MG/DL (ref 0–0.4)
BILIRUB SERPL-MCNC: 0.4 MG/DL (ref 0–1.2)
CHOLEST SERPL-MCNC: 146 MG/DL (ref 100–199)
HDLC SERPL-MCNC: 49 MG/DL
LDLC SERPL CALC-MCNC: 85 MG/DL (ref 0–99)
PROT SERPL-MCNC: 5.9 G/DL (ref 6–8.5)
TRIGL SERPL-MCNC: 58 MG/DL (ref 0–149)
VLDLC SERPL CALC-MCNC: 12 MG/DL (ref 5–40)

## 2024-05-01 RX ORDER — ATORVASTATIN CALCIUM 40 MG/1
40 TABLET, FILM COATED ORAL DAILY
Qty: 90 TABLET | Refills: 3 | Status: SHIPPED | OUTPATIENT
Start: 2024-05-01 | End: 2025-01-02

## 2024-05-01 NOTE — TELEPHONE ENCOUNTER
----- Message from Diamante Johnson MD sent at 5/1/2024  8:18 AM EDT -----  Regarding: Lipids/atorvastatin  Tell her that her LDL is gone up to 85 and is too high.  We need to double her atorvastatin to 40 mg daily and repeat lipids and LFTs in 3 months  ----- Message -----  [facility]: [facility]  Sent: 5/1/2024   2:05 AM EDT  To: Diamante Jonhson MD

## 2024-05-01 NOTE — TELEPHONE ENCOUNTER
----- Message from Diamante Johnson MD sent at 5/1/2024  8:18 AM EDT -----  Regarding: Lipids/atorvastatin  Tell her that her LDL is gone up to 85 and is too high.  We need to double her atorvastatin to 40 mg daily and repeat lipids and LFTs in 3 months  ----- Message -----  From: [facility]  Sent: 5/1/2024   2:05 AM EDT  To: Diamante Johnson MD

## 2024-05-01 NOTE — TELEPHONE ENCOUNTER
Pt called for a refill of her: zolpidem (AMBIEN) 5 mg tablet     Pt would like the medication to go to the pharmacy on file. Please advise.

## 2024-05-01 NOTE — TELEPHONE ENCOUNTER
Spoke with patient. Patient aware LDL has increased; therefore, Dr. Johnson is increasing the patient's Lipitor to 40mg. Also, patient aware Dr. Johnson would like repeat Lipid Panel/LFT x 3mo. Lab slips mailed to patient.

## 2024-05-02 RX ORDER — ZOLPIDEM TARTRATE 5 MG/1
5 TABLET ORAL NIGHTLY PRN
Qty: 30 TABLET | Refills: 1 | Status: SHIPPED | OUTPATIENT
Start: 2024-05-02 | End: 2024-09-25

## 2024-05-02 NOTE — TELEPHONE ENCOUNTER
zolpidem (AMBIEN) 5 mg tablet 5 mg, oral, Nightly PRN       Filled on 04/04/2024  # 30/30  PDMP checked without red flags    Last Office Visit: 4/12/2024   Last Consult Visit: 4/6/2021  Last Telemedicine Visit: 10/31/2023 Ruba Beavers MD    Next Appointment: Visit date not found

## 2024-05-03 NOTE — TELEPHONE ENCOUNTER
She is due for followup with me     Please set up apt for med check in next 2-3 mos    Also remind her to make sure she is utd with followup with her endocrinologist

## 2024-05-07 ENCOUNTER — TELEPHONE (OUTPATIENT)
Dept: CARDIOLOGY | Facility: CLINIC | Age: 54
End: 2024-05-07
Payer: COMMERCIAL

## 2024-05-07 ENCOUNTER — HOSPITAL ENCOUNTER (OUTPATIENT)
Dept: CARDIOLOGY | Facility: CLINIC | Age: 54
Discharge: HOME | End: 2024-05-07
Attending: INTERNAL MEDICINE
Payer: COMMERCIAL

## 2024-05-07 VITALS
HEIGHT: 63 IN | DIASTOLIC BLOOD PRESSURE: 70 MMHG | BODY MASS INDEX: 34.55 KG/M2 | SYSTOLIC BLOOD PRESSURE: 110 MMHG | WEIGHT: 195 LBS

## 2024-05-07 DIAGNOSIS — I21.4 NSTEMI (NON-ST ELEVATED MYOCARDIAL INFARCTION) (CMS/HCC): ICD-10-CM

## 2024-05-07 LAB
AORTIC ROOT ANNULUS: 2.7 CM
AORTIC VALVE MEAN VELOCITY: 1.01 M/S
AORTIC VALVE VELOCITY TIME INTEGRAL: 29.5 CM
ASCENDING AORTA: 2.9 CM
AV MEAN GRADIENT: 4 MMHG
AV PEAK GRADIENT: 7 MMHG
AV PEAK VELOCITY-S: 1.35 M/S
AV VALVE AREA INDEX: 0.81
AV VALVE AREA: 1.18 CM2
AV VELOCITY RATIO: 0.63
AVA (VTI): 1.6 CM2
BSA FOR ECHO PROCEDURE: 1.98 M2
DOP CALC LVOT STROKE VOLUME: 47.05 CM3
E WAVE DECELERATION TIME: 241 MS
E/A RATIO: 0.9
E/E' RATIO: 10.5
E/LAT E' RATIO: 7.7
EDV (BP): 46.3 CM3
EF (A4C): 59 %
EF A2C: 59.3 %
EJECTION FRACTION: 58.7 %
ESV (BP): 19.1 CM3
FRACTIONAL SHORTENING: 40.27 %
INTERVENTRICULAR SEPTUM: 0.88 CM
LA ESV (BP): 33.3 CM3
LA ESV INDEX (A2C): 15.15 CM3/M2
LA ESV INDEX (BP): 16.82 CM3/M2
LA/AORTA RATIO: 1.26
LAAS-AP2: 13.2 CM2
LAAS-AP4: 14.6 CM2
LAD 2D: 3.4 CM
LALD A4C: 4.6 CM
LALD A4C: 4.74 CM
LAV-S: 30 CM3
LEFT ATRIUM VOLUME INDEX: 18.28 CM3/M2
LEFT ATRIUM VOLUME: 36.2 CM3
LEFT INTERNAL DIMENSION IN SYSTOLE: 2.61 CM (ref 2.69–4.07)
LEFT VENTRICLE DIASTOLIC VOLUME INDEX: 25.1 CM3/M2
LEFT VENTRICLE DIASTOLIC VOLUME: 49.7 CM3
LEFT VENTRICLE SYSTOLIC VOLUME INDEX: 10.3 CM3/M2
LEFT VENTRICLE SYSTOLIC VOLUME: 20.4 CM3
LEFT VENTRICULAR INTERNAL DIMENSION IN DIASTOLE: 4.37 CM (ref 4.56–6.33)
LEFT VENTRICULAR POSTERIOR WALL IN END DIASTOLE: 1.07 CM (ref 0.6–1.11)
LV DIASTOLIC VOLUME: 42.7 CM3
LV ESV (APICAL 2 CHAMBER): 17.4 CM3
LVAD-AP2: 18.2 CM2
LVAD-AP4: 19.5 CM2
LVAS-AP2: 10.1 CM2
LVAS-AP4: 11.3 CM2
LVEDVI(A2C): 21.57 CM3/M2
LVEDVI(BP): 23.38 CM3/M2
LVESVI(A2C): 8.79 CM3/M2
LVESVI(BP): 9.65 CM3/M2
LVLD-AP2: 6.43 CM
LVLD-AP4: 6.36 CM
LVLS-AP2: 5.25 CM
LVLS-AP4: 5.43 CM
LVOT 2D: 1.8 CM
LVOT A: 2.54 CM2
LVOT MG: 1 MMHG
LVOT MV: 0.57 M/S
LVOT PEAK VELOCITY: 0.8 M/S
LVOT PG: 3 MMHG
LVOT STROKE VOLUME INDEX: 23.76 ML/M2
LVOT VTI: 18.5 CM
MLH CV ECHO AVA INDEX VELOCITY RATIO: 0.6
MV E'TISSUE VEL-LAT: 0.11 M/S
MV E'TISSUE VEL-MED: 0.08 M/S
MV PEAK A VEL: 0.89 M/S
MV PEAK E VEL: 0.82 M/S
MV STENOSIS PRESSURE HALF TIME: 70 MS
MV VALVE AREA P 1/2 METHOD: 3.14 CM2
POSTERIOR WALL: 1.07 CM
PV PEAK GRADIENT: 4 MMHG
PV PV: 1 M/S
RVOT VMAX: 0.57 M/S
SEPTAL TISSUE DOPPLER FREE WALL LATE DIA VELOCITY (APICAL 4 CHAMBER VIEW): 0.15 M/S
STRESS BASELINE BP: NORMAL MMHG
STRESS BASELINE HR: 88 BPM
STRESS PERCENT HR: 74 %
STRESS POST ESTIMATED WORKLOAD: 4.6 METS
STRESS POST EXERCISE DUR MIN: 2 MIN
STRESS POST EXERCISE DUR SEC: 44 SEC
STRESS POST PEAK BP: NORMAL MMHG
STRESS POST PEAK HR: 122 BPM
STRESS TARGET HR: 140 BPM
TR MAX PG: 12.25 MMHG
TRICUSPID VALVE PEAK REGURGITATION VELOCITY: 1.75 M/S
Z-SCORE OF LEFT VENTRICULAR DIMENSION IN END DIASTOLE: -2.07
Z-SCORE OF LEFT VENTRICULAR DIMENSION IN END SYSTOLE: -1.88
Z-SCORE OF LEFT VENTRICULAR POSTERIOR WALL IN END DIASTOLE: 1.43

## 2024-05-07 PROCEDURE — 93320 DOPPLER ECHO COMPLETE: CPT | Performed by: INTERNAL MEDICINE

## 2024-05-07 PROCEDURE — 93325 DOPPLER ECHO COLOR FLOW MAPG: CPT | Performed by: INTERNAL MEDICINE

## 2024-05-07 PROCEDURE — 93351 STRESS TTE COMPLETE: CPT | Performed by: INTERNAL MEDICINE

## 2024-05-07 NOTE — TELEPHONE ENCOUNTER
On May 7, 2024 I called Annette to discuss her stress test.  I left a message for her to return my call

## 2024-05-08 NOTE — TELEPHONE ENCOUNTER
On May 8, 2024 I spoke with Annette about her stress echo.  Unfortunately it was nondiagnostic because we could not get her target heart rate up.  She got extraordinarily short of breath.  She feels that this is related to her RSV infection from several months ago.  She will be having a pharmacologic stress test to try to put this issue to rest.

## 2024-05-09 DIAGNOSIS — I21.4 NSTEMI (NON-ST ELEVATED MYOCARDIAL INFARCTION) (CMS/HCC): Primary | ICD-10-CM

## 2024-05-09 NOTE — TELEPHONE ENCOUNTER
Called Pt, LVM requesting a return call to schedule [facility] [testing location needs to be indicated]     Note: Auth is required.     Provided callback number: [phone]

## 2024-05-10 NOTE — TELEPHONE ENCOUNTER
Called Pt, LVM requesting a return call to schedule [facility] [testing locations are as follows, please  indicate]    [facility], [place] or [place]        Note: Auth is required.      Provided callback number: [phone]

## 2024-05-23 ENCOUNTER — VBI (OUTPATIENT)
Dept: ADMINISTRATIVE | Facility: OTHER | Age: 54
End: 2024-05-23

## 2024-05-24 NOTE — TELEPHONE ENCOUNTER
Upon review of the In Basket request we were able to locate, review, and update the patient chart as requested for Mammogram.    Any additional questions or concerns should be emailed to the Practice Liaisons via the appropriate education email address, please do not reply via In Basket.    Thank you  Jaye Lord

## 2024-05-29 NOTE — TELEPHONE ENCOUNTER
Pt requesting call back     Would like to have done at [place] as it is closer    Can be reached at [phone]

## 2024-05-30 ENCOUNTER — TELEPHONE (OUTPATIENT)
Dept: CARDIOLOGY | Facility: CLINIC | Age: 54
End: 2024-05-30
Payer: COMMERCIAL

## 2024-05-30 NOTE — TELEPHONE ENCOUNTER
----- Message from Bee Brown sent at 5/13/2024 11:52 AM EDT -----  Regarding: FW: Pharmacologic stress test    ----- Message -----  From: Silvia Knott MA  Sent: 5/9/2024   8:10 AM EDT  To: Les Spain  Subject: RE: Pharmacologic stress test                    Order placed  ----- Message -----  From: Diamante Johnson MD  Sent: 5/8/2024   5:26 PM EDT  To: Silvia Knott MA; Les Spain  Subject: Pharmacologic stress test                        She needs a pharmacologic stress test, because she did not reach target heart rate with her stress echo.

## 2024-05-30 NOTE — TELEPHONE ENCOUNTER
Called patient to let her know that her auth has been updated to [employer] and to call scheduling.  NILS.

## 2024-06-11 ENCOUNTER — HOSPITAL ENCOUNTER (OUTPATIENT)
Dept: RADIOLOGY | Facility: HOSPITAL | Age: 54
Setting detail: NUCLEAR MEDICINE
Discharge: HOME | End: 2024-06-11
Attending: INTERNAL MEDICINE
Payer: COMMERCIAL

## 2024-06-11 ENCOUNTER — HOSPITAL ENCOUNTER (OUTPATIENT)
Dept: CARDIOLOGY | Facility: HOSPITAL | Age: 54
Setting detail: NUCLEAR MEDICINE
Discharge: HOME | End: 2024-06-11
Attending: INTERNAL MEDICINE
Payer: COMMERCIAL

## 2024-06-11 DIAGNOSIS — I21.4 NSTEMI (NON-ST ELEVATED MYOCARDIAL INFARCTION) (CMS/HCC): ICD-10-CM

## 2024-06-11 PROCEDURE — 93018 CV STRESS TEST I&R ONLY: CPT | Performed by: INTERNAL MEDICINE

## 2024-06-11 PROCEDURE — 93017 CV STRESS TEST TRACING ONLY: CPT

## 2024-06-11 PROCEDURE — A9500 TC99M SESTAMIBI: HCPCS

## 2024-06-11 PROCEDURE — 78452 HT MUSCLE IMAGE SPECT MULT: CPT

## 2024-06-11 PROCEDURE — 63600000 HC DRUGS/DETAIL CODE: Mod: JZ | Performed by: NURSE PRACTITIONER

## 2024-06-11 RX ORDER — REGADENOSON 0.08 MG/ML
0.4 INJECTION, SOLUTION INTRAVENOUS ONCE
Status: COMPLETED | OUTPATIENT
Start: 2024-06-11 | End: 2024-06-11

## 2024-06-11 RX ADMIN — KIT FOR THE PREPARATION OF TECHNETIUM TC99M SESTAMIBI 10.7 MILLICURIE: 1 INJECTION, POWDER, LYOPHILIZED, FOR SOLUTION PARENTERAL at 10:13

## 2024-06-11 RX ADMIN — REGADENOSON 0.4 MG: 0.08 INJECTION, SOLUTION INTRAVENOUS at 11:52

## 2024-06-11 RX ADMIN — KIT FOR THE PREPARATION OF TECHNETIUM TC99M SESTAMIBI 34.1 MILLICURIE: 1 INJECTION, POWDER, LYOPHILIZED, FOR SOLUTION PARENTERAL at 11:53

## 2024-06-11 NOTE — TELEPHONE ENCOUNTER
On June 11, 2024 I called Annette to discuss her stress test.  I left a message for her to return my call

## 2024-06-12 ENCOUNTER — TELEPHONE (OUTPATIENT)
Dept: SCHEDULING | Facility: CLINIC | Age: 54
End: 2024-06-12
Payer: COMMERCIAL

## 2024-06-12 LAB
STRESS BASELINE BP: NORMAL MMHG
STRESS BASELINE HR: 83 BPM
STRESS O2 SAT REST: 98 %
STRESS PERCENT HR: 65 %
STRESS POST O2 SAT PEAK: 100 %
STRESS POST PEAK BP: NORMAL MMHG
STRESS POST PEAK HR: 108 BPM
STRESS TARGET HR: 140 BPM

## 2024-06-12 NOTE — TELEPHONE ENCOUNTER
On June 12, 2024 I spoke with Annette about her nuclear stress test.  There was evidence of apical ischemia.  In addition she was extraordinarily short of breath when she attempted to do a stress echo.  Unfortunately it was a nondiagnostic study.  Given all of her comorbidities and shortness of breath, I do think proceeding with cardiac catheterization is appropriate.  She is in agreement.  This will be done in the near future.

## 2024-06-12 NOTE — TELEPHONE ENCOUNTER
Test Results     Name of caller: Annette Montes De Oca    Relationship to patient: Self    Name of patient: Annette Montes De Oca    Name of physician: Dimaante Johnson MD    Type of test: NQST/Discuss Results.     Best contact number: [phone]

## 2024-06-13 DIAGNOSIS — R94.39 ABNORMAL STRESS ECG: Primary | ICD-10-CM

## 2024-06-14 ENCOUNTER — TELEPHONE (OUTPATIENT)
Dept: OBGYN CLINIC | Facility: CLINIC | Age: 54
End: 2024-06-14

## 2024-06-14 NOTE — TELEPHONE ENCOUNTER
Please contact pt. I have not received follow up imaging from [facility] after BIRADS 0 on 5/23/24.   Was it done and if so, please contact [facility] radiology for report. Thanks.

## 2024-06-15 ENCOUNTER — VBI (OUTPATIENT)
Dept: ADMINISTRATIVE | Facility: OTHER | Age: 54
End: 2024-06-15

## 2024-06-17 LAB
BASOPHILS # BLD AUTO: 0.1 X10E3/UL (ref 0–0.2)
BASOPHILS NFR BLD AUTO: 2 %
EOSINOPHIL # BLD AUTO: 0.2 X10E3/UL (ref 0–0.4)
EOSINOPHIL NFR BLD AUTO: 3 %
ERYTHROCYTE [DISTWIDTH] IN BLOOD BY AUTOMATED COUNT: 12.6 % (ref 11.7–15.4)
HCT VFR BLD AUTO: 37.7 % (ref 34–46.6)
HGB BLD-MCNC: 12.4 G/DL (ref 11.1–15.9)
IMM GRANULOCYTES # BLD AUTO: 0 X10E3/UL (ref 0–0.1)
IMM GRANULOCYTES NFR BLD AUTO: 0 %
LYMPHOCYTES # BLD AUTO: 1.5 X10E3/UL (ref 0.7–3.1)
LYMPHOCYTES NFR BLD AUTO: 26 %
MCH RBC QN AUTO: 30 PG (ref 26.6–33)
MCHC RBC AUTO-ENTMCNC: 32.9 G/DL (ref 31.5–35.7)
MCV RBC AUTO: 91 FL (ref 79–97)
MONOCYTES # BLD AUTO: 0.5 X10E3/UL (ref 0.1–0.9)
MONOCYTES NFR BLD AUTO: 8 %
NEUTROPHILS # BLD AUTO: 3.6 X10E3/UL (ref 1.4–7)
NEUTROPHILS NFR BLD AUTO: 61 %
PLATELET # BLD AUTO: 322 X10E3/UL (ref 150–450)
RBC # BLD AUTO: 4.14 X10E6/UL (ref 3.77–5.28)
WBC # BLD AUTO: 5.9 X10E3/UL (ref 3.4–10.8)

## 2024-06-18 LAB
ALBUMIN SERPL-MCNC: 4.1 G/DL (ref 3.8–4.9)
ALP SERPL-CCNC: 117 IU/L (ref 44–121)
ALT SERPL-CCNC: 10 IU/L (ref 0–32)
AST SERPL-CCNC: 16 IU/L (ref 0–40)
BILIRUB DIRECT SERPL-MCNC: 0.1 MG/DL (ref 0–0.4)
BILIRUB SERPL-MCNC: 0.3 MG/DL (ref 0–1.2)
BUN SERPL-MCNC: 7 MG/DL (ref 6–24)
BUN/CREAT SERPL: 7 (ref 9–23)
CALCIUM SERPL-MCNC: 9.3 MG/DL (ref 8.7–10.2)
CHLORIDE SERPL-SCNC: 100 MMOL/L (ref 96–106)
CHOLEST SERPL-MCNC: 118 MG/DL (ref 100–199)
CO2 SERPL-SCNC: 22 MMOL/L (ref 20–29)
CREAT SERPL-MCNC: 1.07 MG/DL (ref 0.57–1)
EGFRCR SERPLBLD CKD-EPI 2021: 61 ML/MIN/1.73
GLUCOSE SERPL-MCNC: 185 MG/DL (ref 70–99)
HDLC SERPL-MCNC: 51 MG/DL
LDLC SERPL CALC-MCNC: 52 MG/DL (ref 0–99)
POTASSIUM SERPL-SCNC: 5 MMOL/L (ref 3.5–5.2)
PROT SERPL-MCNC: 6.2 G/DL (ref 6–8.5)
SODIUM SERPL-SCNC: 135 MMOL/L (ref 134–144)
TRIGL SERPL-MCNC: 75 MG/DL (ref 0–149)
VLDLC SERPL CALC-MCNC: 15 MG/DL (ref 5–40)

## 2024-06-18 NOTE — TELEPHONE ENCOUNTER
Left message for patient to call [facility] and ask them to send result to Dr. Ramos or to schedule appointment for testing if she did not have it done yet.

## 2024-06-21 LAB — MLHC DIABETIC EYE EXAM (EXTERNAL): NORMAL

## 2024-06-25 ENCOUNTER — HOSPITAL ENCOUNTER (OUTPATIENT)
Facility: HOSPITAL | Age: 54
Setting detail: HOSPITAL OUTPATIENT SURGERY
Discharge: HOME | End: 2024-06-25
Attending: INTERNAL MEDICINE | Admitting: INTERNAL MEDICINE
Payer: COMMERCIAL

## 2024-06-25 ENCOUNTER — TELEPHONE (OUTPATIENT)
Dept: CARDIOLOGY | Facility: CLINIC | Age: 54
End: 2024-06-25
Payer: COMMERCIAL

## 2024-06-25 VITALS
SYSTOLIC BLOOD PRESSURE: 141 MMHG | BODY MASS INDEX: 35.07 KG/M2 | DIASTOLIC BLOOD PRESSURE: 65 MMHG | TEMPERATURE: 98.3 F | RESPIRATION RATE: 15 BRPM | WEIGHT: 198 LBS | HEART RATE: 90 BPM | OXYGEN SATURATION: 99 %

## 2024-06-25 DIAGNOSIS — R94.39 ABNORMAL STRESS ECG: ICD-10-CM

## 2024-06-25 LAB
GLUCOSE BLD-MCNC: 226 MG/DL (ref 70–99)
GLUCOSE BLD-MCNC: 338 MG/DL (ref 70–99)
POCT TEST: ABNORMAL
POCT TEST: ABNORMAL

## 2024-06-25 PROCEDURE — C1894 INTRO/SHEATH, NON-LASER: HCPCS | Performed by: INTERNAL MEDICINE

## 2024-06-25 PROCEDURE — 27200000 HC STERILE SUPPLY: Performed by: INTERNAL MEDICINE

## 2024-06-25 PROCEDURE — 71000001 HC PACU PHASE 1 INITIAL 30MIN: Performed by: INTERNAL MEDICINE

## 2024-06-25 PROCEDURE — 63700000 HC SELF-ADMINISTRABLE DRUG: Performed by: INTERNAL MEDICINE

## 2024-06-25 PROCEDURE — 99152 MOD SED SAME PHYS/QHP 5/>YRS: CPT | Performed by: INTERNAL MEDICINE

## 2024-06-25 PROCEDURE — C1887 CATHETER, GUIDING: HCPCS | Performed by: INTERNAL MEDICINE

## 2024-06-25 PROCEDURE — 93571 IV DOP VEL&/PRESS C FLO 1ST: CPT | Mod: 26,52,LD | Performed by: INTERNAL MEDICINE

## 2024-06-25 PROCEDURE — 93458 L HRT ARTERY/VENTRICLE ANGIO: CPT | Mod: 26 | Performed by: INTERNAL MEDICINE

## 2024-06-25 PROCEDURE — 63600105 HC IODINE BASED CONTRAST: Performed by: INTERNAL MEDICINE

## 2024-06-25 PROCEDURE — 93458 L HRT ARTERY/VENTRICLE ANGIO: CPT | Performed by: INTERNAL MEDICINE

## 2024-06-25 PROCEDURE — 4A023N7 MEASUREMENT OF CARDIAC SAMPLING AND PRESSURE, LEFT HEART, PERCUTANEOUS APPROACH: ICD-10-PCS | Performed by: INTERNAL MEDICINE

## 2024-06-25 PROCEDURE — C1769 GUIDE WIRE: HCPCS | Performed by: INTERNAL MEDICINE

## 2024-06-25 PROCEDURE — 99153 MOD SED SAME PHYS/QHP EA: CPT | Performed by: INTERNAL MEDICINE

## 2024-06-25 PROCEDURE — 93571 IV DOP VEL&/PRESS C FLO 1ST: CPT | Mod: 52,LD | Performed by: INTERNAL MEDICINE

## 2024-06-25 PROCEDURE — 71000011 HC PACU PHASE 1 EA ADDL MIN: Performed by: INTERNAL MEDICINE

## 2024-06-25 PROCEDURE — B211YZZ FLUOROSCOPY OF MULTIPLE CORONARY ARTERIES USING OTHER CONTRAST: ICD-10-PCS | Performed by: INTERNAL MEDICINE

## 2024-06-25 PROCEDURE — 25000000 HC PHARMACY GENERAL: Performed by: INTERNAL MEDICINE

## 2024-06-25 PROCEDURE — 63600000 HC DRUGS/DETAIL CODE: Performed by: INTERNAL MEDICINE

## 2024-06-25 PROCEDURE — 25800000 HC PHARMACY IV SOLUTIONS: Performed by: NURSE PRACTITIONER

## 2024-06-25 RX ORDER — MIDAZOLAM HYDROCHLORIDE 2 MG/2ML
INJECTION, SOLUTION INTRAMUSCULAR; INTRAVENOUS
Status: DISCONTINUED | OUTPATIENT
Start: 2024-06-25 | End: 2024-06-25 | Stop reason: HOSPADM

## 2024-06-25 RX ORDER — LIDOCAINE HYDROCHLORIDE 10 MG/ML
INJECTION, SOLUTION INFILTRATION; PERINEURAL
Status: DISCONTINUED | OUTPATIENT
Start: 2024-06-25 | End: 2024-06-25 | Stop reason: HOSPADM

## 2024-06-25 RX ORDER — FENTANYL CITRATE 50 UG/ML
INJECTION, SOLUTION INTRAMUSCULAR; INTRAVENOUS
Status: DISCONTINUED | OUTPATIENT
Start: 2024-06-25 | End: 2024-06-25 | Stop reason: HOSPADM

## 2024-06-25 RX ORDER — HEPARIN SODIUM 1000 [USP'U]/ML
INJECTION, SOLUTION INTRAVENOUS; SUBCUTANEOUS
Status: DISCONTINUED | OUTPATIENT
Start: 2024-06-25 | End: 2024-06-25 | Stop reason: HOSPADM

## 2024-06-25 RX ORDER — SODIUM CHLORIDE 9 MG/ML
40 INJECTION, SOLUTION INTRAVENOUS CONTINUOUS
Status: DISCONTINUED | OUTPATIENT
Start: 2024-06-25 | End: 2024-06-25 | Stop reason: HOSPADM

## 2024-06-25 RX ORDER — ASPIRIN 325 MG
325 TABLET ORAL ONCE
Status: COMPLETED | OUTPATIENT
Start: 2024-06-25 | End: 2024-06-25

## 2024-06-25 RX ORDER — IOPAMIDOL 612 MG/ML
INJECTION, SOLUTION INTRAVASCULAR
Status: DISCONTINUED | OUTPATIENT
Start: 2024-06-25 | End: 2024-06-25 | Stop reason: HOSPADM

## 2024-06-25 RX ADMIN — SODIUM CHLORIDE 40 ML/HR: 9 INJECTION, SOLUTION INTRAVENOUS at 10:51

## 2024-06-25 RX ADMIN — ASPIRIN 325 MG: 325 TABLET ORAL at 10:51

## 2024-06-25 NOTE — PRE-PROCEDURE NOTE
Cardiac Cath Lab Pre-procedure Note    - Patient was seen and examined at bedside.  - The patient's chart and all data was reviewed.  - The procedure, treatment alternatives, risks and benefits were explained with specific risks discussed.  - Patient was consented for cardiac cath procedure and possible PCI.  - Patient's case was found appropriate for dual antiplatelet therapy.    Indication for procedure: Pre-Op Diagnosis Codes:     * Abnormal stress ECG [R94.39]    Patient's clinical presentation to the cardiac cath lab: stable ischemic symptoms.    Patient is at risk for stroke due to extensive atherosclerotic vascular disease; acute myocardial infarction; vascular complications due to the presence of severe peripheral arterial disease in the iliac/femoral vessels increasing the risk of hematoma, retroperitoneal bleeding, pseudo-aneurysms and arteriovenous fistula formation; bleeding and emergency cardiac surgery.   Patient appears to be managing well.     Notable Non-Invasive Cardiac Testing    - Stress Test: positive nuclear stress test, risk for ischemia: intermediate,               Total anti-anginal medications: 1.     Patient is presenting today with no CHF.    Pre-sedation assessment  ASA 3  Mallampati class: III - soft palate, base of uvula visible.

## 2024-06-25 NOTE — POST-PROCEDURE NOTE
TR band removed per protocol. 100 meter ambulation completed. Patient tolerated food and drink, voided without difficulty prior to discharge. Discharge instructions provided. Pt verbalized understanding and all questions answered. All access removed. Patient discharged to main entrance with RN via wheelchair. Assisted into vehicle with family. All belongings with patient.

## 2024-06-25 NOTE — DISCHARGE INSTRUCTIONS
Post cardiac catheterization instructions:  No driving, operating heavy machinery, making critical decisions or activities that require balance for 24 hours.  This is because of sedation you received for your procedure.  On day of discharge, limit activities.  No heavy lifting greater than 10 lbs for the next 2 days after procedure.    Remove dressing next day. Keep site clean and dry.  May shower next day of procedure. Do not submerge catherization site in pool or tub baths for 5 days  Call if  swelling, bleeding, fever or drainage from catheterization site        Medications: Take medications as directed.  Call your physician in any questions or concerns      Follow up with  Dr. Johnson  [phone]  Follow up in 2 weeks

## 2024-06-25 NOTE — ASSESSMENT & PLAN NOTE
PT notes symptoms of fatigue and sob. Pt had RSV in March so thought some of her symptoms were lingering RSV  In November 2022, she had a coronary artery CTA which showed a total calcium score of 110 with most of it being in the right coronary artery. Her disease was found to be nonobstructive.   PT was seen by Dr. Johnson who recommended stress test   Stress Test : IMPRESSION: 1.  Possible small area of stress-induced ischemia at the left ventricular apex.  2.  Left ventricular ejection fraction of 67-82%. 3.  Normal wall motion.  For cath today  Plan pending cath results  Creat 1.07

## 2024-06-25 NOTE — TELEPHONE ENCOUNTER
On June 25, 2024 I called Annette to discuss her catheterization.  I left a message for her to return my call

## 2024-06-25 NOTE — Clinical Note
The right coronary artery was selectively engaged, injected and visualized. Multiple views of the injected vessel were taken. Impulse jr 4

## 2024-06-25 NOTE — H&P
Cardiology History and Physical     Admitting Diagnosis   Abnormal stress ECG [R94.39]   HPI    Annette Montes De Oca is a 54 y.o. female with PMH of cad,, dm hypothyroid, brachial vein [id number] who presents to [facility] for cardiac catheterization. PT notes symptoms of fatigue and sob. Pt had RSV in March so thought some of her symptoms were lingering RSV. In November 2022, she had a coronary artery CTA which showed a total calcium score of 110 with most of it being in the right coronary artery. Her disease was found to be nonobstructive. PT was seen by Dr. Johnson who recommended stress test   Stress Test : IMPRESSION: 1.  Possible small area of stress-induced ischemia at the left ventricular apex.2.  Left ventricular ejection fraction of 67-82%. 3.  Normal wall motion. For cath today      Medical History   Medical History:   Past Medical History:   Diagnosis Date    Acute deep vein thrombosis (DVT) of brachial vein of right upper extremity (CMS/HCC) 8/28/2022    Allergic     Coronary artery disease     Diabetes mellitus type I (CMS/HCC)     Disease of thyroid gland     Grand mal seizure (CMS/HCC)        Surgical History:   Past Surgical History:   Procedure Laterality Date    CARPAL TUNNEL RELEASE      FOOT SURGERY      HERNIA REPAIR      MOUTH SURGERY      OOPHORECTOMY      SINUS SURGERY      TOOTH EXTRACTION      WISDOM TOOTH EXTRACTION         Allergies: Codeine, Cortisone, Oxycodone, and Penicillins    Current Facility-Administered Medications   Medication Dose Route Frequency Provider Last Rate Last Admin    aspirin tablet 325 mg  325 mg oral Once Lucy Grande MD        sodium chloride 0.9 % infusion  40 mL/hr intravenous Continuous Cristina Fraser CRNP           Social History:   Social History     Socioeconomic History    Marital status:      Spouse name: None    Number of children: None    Years of education: None    Highest education level: None   Tobacco Use    Smoking status: Never    Smokeless  tobacco: Never   Vaping Use    Vaping Use: Never used   Substance and Sexual Activity    Alcohol use: No    Drug use: No    Sexual activity: Defer   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   Objective    Vital Signs for the last 24 hours   Temp:  [36.8 °C (98.3 °F)] 36.8 °C (98.3 °F)  Heart Rate:  [98] 98  Resp:  [13] 13  BP: (168)/(74) 168/74       Physical Exam   Visit Vitals  BP (!) 168/74   Pulse 98   Temp 36.8 °C (98.3 °F) (Oral)   Resp 13   Wt 89.8 kg (198 lb)   SpO2 98%   BMI 35.07 kg/m²     General appearance: alert, appears stated age, and cooperative  Head: normocephalic, without obvious abnormality, atraumatic  Lungs: clear to auscultation bilaterally  Heart: regular rate and rhythm, S1, S2 normal, no murmur, click, rub or gallop  Abdomen: soft, non-tender; bowel sounds normal; no masses, no organomegaly  Extremities: extremities normal, warm and well-perfused; no cyanosis, clubbing, or edema  Pulses: 2+ and symmetric bilateral DP  Skin: Skin color, texture, turgor normal. No rashes or lesions  Neurologic: Grossly normal      Labs   Lab Results   Component Value Date    WBC 5.9 06/17/2024    HGB 12.4 06/17/2024    HCT 37.7 06/17/2024     06/17/2024    CHOL 118 06/17/2024    TRIG 75 06/17/2024    HDL 51 06/17/2024    ALT 10 06/17/2024    AST 16 06/17/2024     06/17/2024    K 5.0 06/17/2024     06/17/2024    CREATININE 1.07 (H) 06/17/2024    BUN 7 06/17/2024    CO2 22 06/17/2024    TSH 1.810 07/08/2022    HGBA1C 7.4 (H) 05/02/2024    MICROALBUR <3.0 12/29/2023     Troponin I Results    No lab values to display.           Imaging        Cardiac Imaging    ECHOCARDIOGRAM STRESS  TEST 05/07/2024    Interpretation Summary  1.  Exercise ECG test was terminated because of shortness of breath.  She could not keep up with the treadmill.  2.  Target heart rate was not achieved.  3.  Exercise capacity was poor.  4.  There were no significant arrhythmias.  5.  Normal blood pressure response to exercise.  6.  Nondiagnostic exercise ECG test because of insufficient workload.  7.  Nondiagnostic echocardiographic portion of the stress test because she did not reach target heart rate.      Baseline echo:  1.  Normal left ventricular function with an estimated ejection fraction of 55 to 60%.  2.  No segmental wall motion abnormalities.  3.  All 3 leaflets of the aortic valve were never clearly seen.  Leaflets that were noted appeared somewhat thickened.  There was no aortic stenosis or aortic insufficiency.  4.  Mitral annular calcification.    Cardiac Imaging    US CAROTID BILATERAL 11/18/2022    Interpretation Summary  No stenosis or plaque burden is identified in the insonated carotid arteries bilaterally.       ECG     sinus rhythm   Telemetry   sinus rhythm      Assessment/Plan      Abnormal stress ECG    PT notes symptoms of fatigue and sob. Pt had RSV in March so thought some of her symptoms were lingering RSV  In November 2022, she had a coronary artery CTA which showed a total calcium score of 110 with most of it being in the right coronary artery. Her disease was found to be nonobstructive.   PT was seen by Dr. Johnson who recommended stress test   Stress Test : IMPRESSION: 1.  Possible small area of stress-induced ischemia at the left ventricular apex.  2.  Left ventricular ejection fraction of 67-82%. 3.  Normal wall motion.  For cath today  Plan pending cath results  Creat 1.07      Hypothyroid    On synthroid    Type 1 diabetes mellitus with hyperglycemia (CMS/[facility])  Pt using insulin pump                  CLINTON Redmond  6/25/2024  10:47 AM

## 2024-06-26 DIAGNOSIS — R94.39 ABNORMAL STRESS ECG: Primary | ICD-10-CM

## 2024-06-26 DIAGNOSIS — R93.1 ELEVATED CORONARY ARTERY CALCIUM SCORE: ICD-10-CM

## 2024-06-26 NOTE — TELEPHONE ENCOUNTER
On June 26, 2024 I spoke with Annette about her cardiac catheterization.  She has moderate lesion in her LAD with a totally normal IFR.  She understands the need to keep her LDL quite low.  She will continue on her baby aspirin.  If all goes well I will see her again in 18 months.  Clearly stress testing has not been helpful in her, either nuclear stress testing or echo stress testing.  She will have a coronary artery CTA prior to her visit with me in 18 months

## 2024-06-29 ENCOUNTER — TELEMEDICINE (OUTPATIENT)
Dept: PRIMARY CARE | Facility: CLINIC | Age: 54
End: 2024-06-29
Payer: COMMERCIAL

## 2024-06-29 DIAGNOSIS — K21.9 GASTROESOPHAGEAL REFLUX DISEASE WITHOUT ESOPHAGITIS: Primary | ICD-10-CM

## 2024-06-29 PROCEDURE — 99214 OFFICE O/P EST MOD 30 MIN: CPT | Mod: 95 | Performed by: NURSE PRACTITIONER

## 2024-06-29 RX ORDER — PANTOPRAZOLE SODIUM 40 MG/1
40 TABLET, DELAYED RELEASE ORAL 2 TIMES DAILY
Qty: 120 TABLET | Refills: 0 | Status: SHIPPED | OUTPATIENT
Start: 2024-06-29 | End: 2024-10-21 | Stop reason: SDUPTHER

## 2024-06-29 ASSESSMENT — ENCOUNTER SYMPTOMS
COUGH: 0
VOMITING: 0
CONSTIPATION: 0
NAUSEA: 0
RECTAL PAIN: 0
BLOOD IN STOOL: 0
APPETITE CHANGE: 0
ABDOMINAL DISTENTION: 0
DIARRHEA: 0

## 2024-06-29 NOTE — PROGRESS NOTES
"Verification of Patient Location:  The patient affirms they are currently located in the following state: [place]    Request for Consent:    Audio and Video Encounter   Kai, my name is CLINTON Chacko.  Before we proceed, can you please verify your identification by telling me your full name and date of birth?  Can you tell me who is in the room with you?    You and I are about to have a telemedicine check-in or visit because you have requested it.  This is a live video-conference.  I am a real person, speaking to you in real time.  There is no one else with me on the video-conference. I am not recording this conversation and I am asking you not to record it.  This telemedicine visit will be billed to your health insurance or you, if you are self-insured.  You understand you will be responsible for any copayments or coinsurances that apply to your telemedicine visit.  Communication platform used for this encounter:  [employer] Video Visit (Epic Video Client)       Before starting our telemedicine visit, I am required to get your consent for this virtual check-in or visit by telemedicine. Do you consent?    Patient Response to Request for Consent:  Yes      Visit Documentation:  Subjective     Patient ID: Annette Montes De Oca is a 54 y.o. female.  1970      Annette reports she has been dealing with stress induced reflux for the last several weeks.  She describes \"burning\" in her chest-centrally located with burping at times.  She has been taking tums which provides some temporary relief and feels stress makes it worse.  She had a recent cardiac cath-no stent was indicated but she is on asa.  Denies other NSAID use, new medication, no blood noted in Bms or vomiting.  Denies shortness of breath, chest pain or activity intolerance.        The following have been reviewed and updated as appropriate in this visit:   Allergies  Meds  Problems       Review of Systems   Constitutional:  Negative for appetite " "change.   HENT:  Negative for congestion.    Respiratory:  Negative for cough.    Gastrointestinal:  Negative for abdominal distention, blood in stool, constipation, diarrhea, nausea, rectal pain and vomiting.        + for \"burning or ache\" around sternum and upwards     Seen on video encounter today.  Pt is not in acute distress, no conversational dyspnea.      Assessment/Plan   Diagnoses and all orders for this visit:    Gastroesophageal reflux disease without esophagitis (Primary)  Assessment & Plan:  Annette complains of stess induced GERD.  She did have an endoscopy about 7 years ago and was noted to have gastritis.  Will start BID protonix for 2 weeks followed by daily protonix  Advised to eat small frequent meals, sit up for 30 minutes after eating and avoid food triggers      Other orders  -     pantoprazole (PROTONIX) 40 mg EC tablet; Take 1 tablet (40 mg total) by mouth 2 (two) times a day. Take 40 mg BID for 2 weeks, then 40 mg daily        Time Spent:  I spent 20 minutes on this date of service performing the following activities: obtaining history, entering orders, documenting, preparing for visit, and providing counseling and education.    "

## 2024-06-29 NOTE — ASSESSMENT & PLAN NOTE
Annette complains of stess induced GERD.  She did have an endoscopy about 7 years ago and was noted to have gastritis.  Will start BID protonix for 2 weeks followed by daily protonix  Advised to eat small frequent meals, sit up for 30 minutes after eating and avoid food triggers

## 2024-07-08 ENCOUNTER — TELEPHONE (OUTPATIENT)
Dept: PRIMARY CARE | Facility: CLINIC | Age: 54
End: 2024-07-08
Payer: COMMERCIAL

## 2024-07-08 NOTE — TELEPHONE ENCOUNTER
Patient called and LVM to notify Dr Beavers she tested positive for COVID. She has some swollen glands and a little achy.     She also had a breast biopsy that returned negative for cancer.

## 2024-07-11 RX ORDER — AZELASTINE 1 MG/ML
SPRAY, METERED NASAL
Qty: 30 ML | Refills: 0 | Status: SHIPPED | OUTPATIENT
Start: 2024-07-11 | End: 2024-08-02

## 2024-07-11 NOTE — TELEPHONE ENCOUNTER
Lvm for patient, checking in to see how she's feeling. Advised pt to reach out if she has any questions or concerns

## 2024-07-26 PROBLEM — Z98.890 HISTORY OF BENIGN BREAST BIOPSY: Status: ACTIVE | Noted: 2024-07-26

## 2024-07-30 ENCOUNTER — OFFICE VISIT (OUTPATIENT)
Dept: PRIMARY CARE | Facility: CLINIC | Age: 54
End: 2024-07-30
Payer: COMMERCIAL

## 2024-07-30 VITALS
WEIGHT: 190 LBS | HEART RATE: 88 BPM | OXYGEN SATURATION: 99 % | DIASTOLIC BLOOD PRESSURE: 78 MMHG | SYSTOLIC BLOOD PRESSURE: 142 MMHG | BODY MASS INDEX: 33.66 KG/M2 | HEIGHT: 63 IN | TEMPERATURE: 97.8 F

## 2024-07-30 DIAGNOSIS — R30.0 DYSURIA: Primary | ICD-10-CM

## 2024-07-30 LAB
BILIRUBIN, POC: NEGATIVE
BLOOD URINE, POC: ABNORMAL
CLARITY, POC: ABNORMAL
COLOR, POC: YELLOW
EXPIRATION DATE: ABNORMAL
GLUCOSE URINE, POC: NEGATIVE
KETONES, POC: NEGATIVE
LEUKOCYTE EST, POC: ABNORMAL
Lab: ABNORMAL
NITRITE, POC: ABNORMAL
PH, POC: 6
POCT MANUFACTURER: ABNORMAL
PROTEIN, POC: ABNORMAL
SPECIFIC GRAVITY, POC: 1.02
UROBILINOGEN, POC: 0.2

## 2024-07-30 PROCEDURE — 99213 OFFICE O/P EST LOW 20 MIN: CPT | Performed by: NURSE PRACTITIONER

## 2024-07-30 PROCEDURE — 81002 URINALYSIS NONAUTO W/O SCOPE: CPT | Performed by: NURSE PRACTITIONER

## 2024-07-30 PROCEDURE — 3008F BODY MASS INDEX DOCD: CPT | Performed by: NURSE PRACTITIONER

## 2024-07-30 RX ORDER — NITROFURANTOIN 25; 75 MG/1; MG/1
100 CAPSULE ORAL 2 TIMES DAILY
Qty: 10 CAPSULE | Refills: 0 | Status: SHIPPED | OUTPATIENT
Start: 2024-07-30 | End: 2024-08-04

## 2024-07-30 ASSESSMENT — ENCOUNTER SYMPTOMS
HEMATURIA: 0
PSYCHIATRIC NEGATIVE: 1
DYSURIA: 1
FLANK PAIN: 0
GASTROINTESTINAL NEGATIVE: 1
FREQUENCY: 1
DIFFICULTY URINATING: 0
CONSTITUTIONAL NEGATIVE: 1

## 2024-07-30 NOTE — PATIENT INSTRUCTIONS
Thank you for allowing me to participate in your care, it was a pleasure to care for you today. Should any questions or concerns arise, please call or message me on My Chart.     Increase fluid intake   Complete antibiotic as prescribed - macrobid   We sent your urine to the lab for culture   Will call or message you with your urine culture results     Call endocrine if you need help managing your sugars

## 2024-07-30 NOTE — PROGRESS NOTES
[facility]   [address] [place] [address], [address]  [place] [address]  Phone: [phone]  Fax:[phone]        Subjective      Patient ID: Annette Montes De Oca is a 54 y.o. female.  1970      painful urination for the last few days    Pelvic pressure on left side   Frequent urination   No fever chills or flank pain       [facility]ega [facility] her diabetes   Sugar was 358 today - has been using her sliding scale to keep her numbers down   Knows to call endocrine if she needs           The following have been reviewed and updated as appropriate in this visit:   Allergies  Meds  Problems       Review of Systems   Constitutional: Negative.    Gastrointestinal: Negative.    Genitourinary:  Positive for dysuria, frequency and pelvic pain. Negative for decreased urine volume, difficulty urinating, enuresis, flank pain, hematuria and urgency.   Psychiatric/Behavioral: Negative.       Patient Active Problem List   Diagnosis    Anxiety    Hypothyroid    Iron deficiency anemia    Paronychia of toe of left foot    Dysuria    Seasonal allergies    Type 1 diabetes mellitus with hyperglycemia (CMS/HCC)    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 1 diabetes mellitus (CMS/HCC)    NSTEMI (non-ST elevated myocardial infarction) (CMS/HCC)    Cataract of right eye    Family history of cerebrovascular accident (CVA)    Pure hypercholesterolemia    Left acute otitis media    Current moderate episode of major depressive disorder without prior episode (CMS/HCC)    Upper respiratory tract infection    Urinary frequency    Acute cystitis without hematuria    Type 1 diabetes mellitus with proliferative retinopathy (CMS/HCC)    Cold virus    Allergic rhinitis    Chronic cough    Abnormal stress ECG    Gastroesophageal reflux disease without esophagitis      Past Medical History:   Diagnosis Date    Acute deep vein thrombosis (DVT) of brachial vein  "of right upper extremity (CMS/HCC) 8/28/2022    Allergic     Coronary artery disease     Diabetes mellitus type I (CMS/HCC)     Disease of thyroid gland     Grand mal seizure (CMS/HCC)      Past Surgical History:   Procedure Laterality Date    CARPAL TUNNEL RELEASE      FOOT SURGERY      HERNIA REPAIR      MOUTH SURGERY      OOPHORECTOMY      SINUS SURGERY      TOOTH EXTRACTION      WISDOM TOOTH EXTRACTION       Social History     Socioeconomic History    Marital status:      Spouse name: None    Number of children: None    Years of education: None    Highest education level: None   Tobacco Use    Smoking status: Never    Smokeless tobacco: Never   Vaping Use    Vaping Use: Never used   Substance and Sexual Activity    Alcohol use: No    Drug use: No    Sexual activity: Defer   Social History Narrative    [employer]     Objective     Vitals:    07/30/24 1052   BP: (!) 142/78   BP Location: Right upper arm   Patient Position: Sitting   Pulse: 88   Temp: 36.6 °C (97.8 °F)   SpO2: 99%   Weight: 86.2 kg (190 lb)   Height: 1.6 m (5' 3\")     Body mass index is 33.66 kg/m².  Current Outpatient Medications   Medication Sig Dispense Refill    aspirin 81 mg enteric coated tablet Take 81 mg by mouth daily. 1/2 tablet      atorvastatin (LIPITOR) 40 mg tablet Take 1 tablet (40 mg total) by mouth daily. 90 tablet 3    azelastine (ASTELIN) 137 mcg (0.1 %) nasal spray ADMINISTER 2 SPRAYS INTO EACH NOSTRIL 2 TIMES A DAY AS DIRECTED 30 mL 0    BD ULTRA-FINE SHORT PEN NEEDLE 31 gauge x 5/16\" needle 1 EACH DAILY. BD ULTRA FINE      blood-glucose transmitter (DEXCOM G6 TRANSMITTER) device USE ONE TRANSMITTER WITH /SENSORS AND REPLACE EVERY 3 MONTHS      buPROPion XL (WELLBUTRIN XL) 150 mg 24 hr tablet TAKE 1 TABLET BY MOUTH EVERY DAY 30 tablet 5    carboxymethylcellulose sodium (ARTIFICIAL TEARS, CMC, OPHT) Administer into affected eye(s).      dextromethorphan-guaifenesin (MUCINEX DM)  mg tablet extended release " 12 hr Take 1 tablet by mouth every 12 (twelve) hours. Prn cough congestion ( one 1 tab q 12 hrs) 14 each 0    escitalopram (LEXAPRO) 10 mg tablet TAKE 1 TABLET BY MOUTH EVERY DAY 30 tablet 5    fexofenadine (ALLEGRA) 180 mg tablet Take 1 tablet (180 mg total) by mouth daily. 90 tablet 1    insulin aspart (NovoLOG) 100 unit/mL patient supplied pump Inject under the skin continuously.      levothyroxine (SYNTHROID) 75 mcg tablet 1 TABLET ON AN EMPTY STOMACH IN THE MORNING  3    mometasone furoate (NASONEX NASL) Administer into affected nostril(s).      montelukast (SINGULAIR) 10 mg tablet TAKE 1 TABLET BY MOUTH EVERY DAY AT NIGHT 30 tablet 4    nitrofurantoin, macrocrystal-monohydrate, (MACROBID) 100 mg capsule Take 1 capsule (100 mg total) by mouth 2 (two) times a day for 5 days. 10 capsule 0    NovoLIN N NPH U-100 Insulin 100 unit/mL injection INJECT 40 UNITS SUBCUTANEOUS DAILY AT BEDTIME      pantoprazole (PROTONIX) 40 mg EC tablet Take 1 tablet (40 mg total) by mouth 2 (two) times a day. Take 40 mg BID for 2 weeks, then 40 mg daily 120 tablet 0    zolpidem (AMBIEN) 5 mg tablet Take 1 tablet (5 mg total) by mouth nightly as needed for sleep. 30 tablet 1     No current facility-administered medications for this visit.       Physical Exam  Vitals and nursing note reviewed.   Constitutional:       Appearance: Normal appearance. She is normal weight.   Abdominal:      General: Abdomen is flat. There is no distension.      Palpations: Abdomen is soft.      Tenderness: There is no abdominal tenderness. There is no right CVA tenderness or left CVA tenderness.   Neurological:      General: No focal deficit present.      Mental Status: She is alert and oriented to person, place, and time.   Psychiatric:         Mood and Affect: Mood normal.         Behavior: Behavior normal.         Thought Content: Thought content normal.         Assessment/Plan     Diagnoses and all orders for this visit:    Dysuria (Primary)  Assessment  & Plan:    Increase fluid intake   Complete antibiotic as prescribed - macrobid   We sent your urine to the lab for culture   Will call or message you with your urine culture results     Orders:  -     POCT urinalysis dipstick  -     Urinalysis (clean catch); Future  -     Urine culture (clean catch); Future    Other orders  -     nitrofurantoin, macrocrystal-monohydrate, (MACROBID) 100 mg capsule; Take 1 capsule (100 mg total) by mouth 2 (two) times a day for 5 days.           CLINTON Spencer   7/30/2024

## 2024-07-31 LAB
APPEARANCE UR: CLEAR
BILIRUB UR QL STRIP: NEGATIVE
COLOR UR: YELLOW
GLUCOSE UR QL STRIP: ABNORMAL
HGB UR QL STRIP: NEGATIVE
KETONES UR QL STRIP: NEGATIVE
LEUKOCYTE ESTERASE UR QL STRIP: NEGATIVE
MICRO URNS: ABNORMAL
NITRITE UR QL STRIP: NEGATIVE
PH UR STRIP: 6 [PH] (ref 5–7.5)
PROT UR QL STRIP: NEGATIVE
SP GR UR STRIP: 1.02 (ref 1–1.03)
UROBILINOGEN UR STRIP-MCNC: 0.2 MG/DL (ref 0.2–1)

## 2024-08-01 LAB
BACTERIA UR CULT: NO GROWTH
BACTERIA UR CULT: NORMAL

## 2024-08-02 RX ORDER — AZELASTINE 1 MG/ML
SPRAY, METERED NASAL
Qty: 30 ML | Refills: 1 | Status: SHIPPED | OUTPATIENT
Start: 2024-08-02 | End: 2024-09-17

## 2024-08-07 DIAGNOSIS — F32.1 CURRENT MODERATE EPISODE OF MAJOR DEPRESSIVE DISORDER WITHOUT PRIOR EPISODE (CMS/HCC): ICD-10-CM

## 2024-08-07 DIAGNOSIS — F41.9 ANXIETY: ICD-10-CM

## 2024-08-08 RX ORDER — BUPROPION HYDROCHLORIDE 150 MG/1
TABLET ORAL
Qty: 30 TABLET | Refills: 2 | Status: SHIPPED | OUTPATIENT
Start: 2024-08-08 | End: 2024-10-14

## 2024-08-12 DIAGNOSIS — F41.9 ANXIETY: ICD-10-CM

## 2024-08-13 RX ORDER — ESCITALOPRAM OXALATE 10 MG/1
10 TABLET ORAL DAILY
Qty: 30 TABLET | Refills: 1 | Status: SHIPPED | OUTPATIENT
Start: 2024-08-13 | End: 2024-10-14

## 2024-08-14 ENCOUNTER — TELEPHONE (OUTPATIENT)
Dept: PRIMARY CARE | Facility: CLINIC | Age: 54
End: 2024-08-14
Payer: COMMERCIAL

## 2024-08-14 NOTE — TELEPHONE ENCOUNTER
Patient stopped by office to drop off H&P for cataract surgery. Paperwork is in Dr. Beavers's mail bin.

## 2024-08-15 ENCOUNTER — TELEPHONE (OUTPATIENT)
Dept: PRIMARY CARE | Facility: CLINIC | Age: 54
End: 2024-08-15
Payer: COMMERCIAL

## 2024-08-15 NOTE — TELEPHONE ENCOUNTER
Pt called and has surgery on 8/30/24 . She stated she doesn't need a pre-op appt just lab work. I advised she will need pre-op appt. Can this be down at her appt on the 27th? And can labs be ordered for her.

## 2024-08-16 NOTE — TELEPHONE ENCOUNTER
[facility] Appointment Request   Provider: Dr Beavers  Appointment Type: Pre-Op  Reason for Visit: Cataract surgery 8/30, but per [facility] the pre-op paperwork is due by 8/23. No EKG needed  Available Day and Time: anytime next wk, just needs appt by Friday when form is due  Best Contact Number: [phone]    The practice will reach out to schedule your appointment within the next 2 business days.

## 2024-08-22 ENCOUNTER — CONSULT (OUTPATIENT)
Dept: PRIMARY CARE | Facility: CLINIC | Age: 54
End: 2024-08-22
Payer: COMMERCIAL

## 2024-08-22 VITALS
TEMPERATURE: 97.6 F | RESPIRATION RATE: 18 BRPM | DIASTOLIC BLOOD PRESSURE: 70 MMHG | HEIGHT: 63 IN | OXYGEN SATURATION: 98 % | SYSTOLIC BLOOD PRESSURE: 120 MMHG | HEART RATE: 90 BPM | BODY MASS INDEX: 33.52 KG/M2 | WEIGHT: 189.2 LBS

## 2024-08-22 DIAGNOSIS — F32.1 CURRENT MODERATE EPISODE OF MAJOR DEPRESSIVE DISORDER WITHOUT PRIOR EPISODE (CMS/HCC): ICD-10-CM

## 2024-08-22 DIAGNOSIS — E10.3553 STABLE PROLIFERATIVE DIABETIC RETINOPATHY OF BOTH EYES ASSOCIATED WITH TYPE 1 DIABETES MELLITUS (CMS/HCC): Primary | ICD-10-CM

## 2024-08-22 DIAGNOSIS — E10.3599 TYPE 1 DIABETES MELLITUS WITH PROLIFERATIVE RETINOPATHY, MACULAR EDEMA PRESENCE UNSPECIFIED, UNSPECIFIED LATERALITY, UNSPECIFIED PROLIFERATIVE RETINOPATHY TYPE (CMS/HCC): ICD-10-CM

## 2024-08-22 DIAGNOSIS — E78.00 PURE HYPERCHOLESTEROLEMIA: ICD-10-CM

## 2024-08-22 DIAGNOSIS — I21.4 NSTEMI (NON-ST ELEVATED MYOCARDIAL INFARCTION) (CMS/HCC): ICD-10-CM

## 2024-08-22 DIAGNOSIS — E03.9 ACQUIRED HYPOTHYROIDISM: ICD-10-CM

## 2024-08-22 PROBLEM — R35.0 URINARY FREQUENCY: Status: RESOLVED | Noted: 2023-05-26 | Resolved: 2024-08-22

## 2024-08-22 PROBLEM — E10.65 TYPE 1 DIABETES MELLITUS WITH HYPERGLYCEMIA (CMS/HCC): Status: RESOLVED | Noted: 2021-04-07 | Resolved: 2024-08-22

## 2024-08-22 PROBLEM — L03.032 PARONYCHIA OF TOE OF LEFT FOOT: Status: RESOLVED | Noted: 2019-01-02 | Resolved: 2024-08-22

## 2024-08-22 PROBLEM — J00 COLD VIRUS: Status: RESOLVED | Noted: 2024-03-11 | Resolved: 2024-08-22

## 2024-08-22 PROBLEM — J06.9 UPPER RESPIRATORY TRACT INFECTION: Status: RESOLVED | Noted: 2023-05-09 | Resolved: 2024-08-22

## 2024-08-22 PROBLEM — R30.0 DYSURIA: Status: RESOLVED | Noted: 2019-07-19 | Resolved: 2024-08-22

## 2024-08-22 PROBLEM — R05.3 CHRONIC COUGH: Status: RESOLVED | Noted: 2024-04-12 | Resolved: 2024-08-22

## 2024-08-22 PROBLEM — R94.39 ABNORMAL STRESS ECG: Status: RESOLVED | Noted: 2024-06-13 | Resolved: 2024-08-22

## 2024-08-22 PROBLEM — N30.00 ACUTE CYSTITIS WITHOUT HEMATURIA: Status: RESOLVED | Noted: 2023-10-31 | Resolved: 2024-08-22

## 2024-08-22 PROBLEM — H66.92 LEFT ACUTE OTITIS MEDIA: Status: RESOLVED | Noted: 2023-02-09 | Resolved: 2024-08-22

## 2024-08-22 PROCEDURE — 99214 OFFICE O/P EST MOD 30 MIN: CPT | Performed by: FAMILY MEDICINE

## 2024-08-22 PROCEDURE — 3008F BODY MASS INDEX DOCD: CPT | Performed by: FAMILY MEDICINE

## 2024-08-22 NOTE — PROGRESS NOTES
[facility]   Ruba Nimesh  [address] [place] [address], [address]  [place] [address]  Phone: [phone]  Fax:[phone]      Visit Date: 8/23/2024    Patient ID: Annette Montes De Oca is a 54 y.o. female.  Surgeon: Eddie Miranda MD    Date of surgery: 8/302024    Procedure: cataract surgery right eye      Chief Complaint: Pre-op Exam (R cataract surgery, 8/30. [facility], Dr Eddie Miranda )        HPI    Pt with diabetes CAD depression higher chol presents for preoperative evaluation and also routine followup.      Endo Dr Vega -  has insulin pump hga1c is better control  5/2024   Cardio Dr Diamante Johnson  Cardiac cath     [facility]/CORONARY ANGIOGRAM FINDINGS:  1. Non-obstructive coronary artery disease.  2. Mid LAD with 40-50% disease. IFR negative 0.92.  RECOMMENDATIONS  1. Medical therapy optimization.  2. Cardiovascular risk factor modification.     Podiatrist: dr jesus garcia      Obgyn: [facility]     Tsh 1.04 5/2/2024  per endo     Lab Results   Component Value Date    HGBA1C 7.4 (H) 05/02/2024    HGBA1C 8.0 (H) 12/29/2023    HGBA1C 8.1 (H) 12/29/2023     Lab Results   Component Value Date    MICROALBUR <3.0 12/29/2023    LDLCALC 52 06/17/2024    CREATININE 1.21 (H) 08/22/2024     Lab Results   Component Value Date    WBC 5.9 06/17/2024    HGB 12.4 06/17/2024    HCT 37.7 06/17/2024     06/17/2024    CHOL 118 06/17/2024    TRIG 75 06/17/2024    HDL 51 06/17/2024    ALT 10 06/17/2024    AST 16 06/17/2024     08/22/2024    K 5.1 08/22/2024     08/22/2024    CREATININE 1.21 (H) 08/22/2024    BUN 13 08/22/2024    CO2 26 08/22/2024    TSH 1.810 07/08/2022    HGBA1C 7.4 (H) 05/02/2024    MICROALBUR <3.0 12/29/2023    LDLCALC 52 06/17/2024      Patient Active Problem List   Diagnosis    Anxiety    Hypothyroid    Iron deficiency anemia    Seasonal allergies    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 1  "diabetes mellitus (CMS/[facility])    NSTEMI (non-ST elevated myocardial infarction) (CMS/[facility])    Cataract of right eye    Family history of cerebrovascular accident (CVA)    Pure hypercholesterolemia    Current moderate episode of major depressive disorder without prior episode (CMS/[facility])    Type 1 diabetes mellitus with proliferative retinopathy (CMS/[facility])    Allergic rhinitis    Gastroesophageal reflux disease without esophagitis          Current Outpatient Medications:     aspirin 81 mg enteric coated tablet, Take 81 mg by mouth daily. 1/2 tablet, Disp: , Rfl:     atorvastatin (LIPITOR) 40 mg tablet, Take 1 tablet (40 mg total) by mouth daily., Disp: 90 tablet, Rfl: 3    azelastine (ASTELIN) 137 mcg (0.1 %) nasal spray, SPRAY 2 SPRAYS INTO EACH NOSTRIL TWICE A DAY AS DIRECTED, Disp: 30 mL, Rfl: 1    BD ULTRA-FINE SHORT PEN NEEDLE 31 gauge x 5/16\" needle, 1 EACH DAILY. BD ULTRA FINE, Disp: , Rfl:     buPROPion XL (WELLBUTRIN XL) 150 mg 24 hr tablet, TAKE 1 TABLET BY MOUTH EVERY DAY, Disp: 30 tablet, Rfl: 2    carboxymethylcellulose sodium (ARTIFICIAL TEARS, CMC, OPHT), Administer into affected eye(s)., Disp: , Rfl:     dextromethorphan-guaifenesin (MUCINEX DM)  mg tablet extended release 12 hr, Take 1 tablet by mouth every 12 (twelve) hours. Prn cough congestion ( one 1 tab q 12 hrs), Disp: 14 each, Rfl: 0    escitalopram (LEXAPRO) 10 mg tablet, TAKE 1 TABLET BY MOUTH EVERY DAY, Disp: 30 tablet, Rfl: 1    fexofenadine (ALLEGRA) 180 mg tablet, Take 1 tablet (180 mg total) by mouth daily., Disp: 90 tablet, Rfl: 1    insulin aspart (NovoLOG) 100 unit/mL patient supplied pump, Inject under the skin continuously., Disp: , Rfl:     levothyroxine (SYNTHROID) 75 mcg tablet, 1 TABLET ON AN EMPTY STOMACH IN THE MORNING, Disp: , Rfl: 3    mometasone furoate (NASONEX NASL), Administer into affected nostril(s)., Disp: , Rfl:     montelukast (SINGULAIR) 10 mg tablet, TAKE 1 TABLET BY MOUTH EVERY DAY AT NIGHT, Disp: 30 tablet, " "Rfl: 4    pantoprazole (PROTONIX) 40 mg EC tablet, Take 1 tablet (40 mg total) by mouth 2 (two) times a day. Take 40 mg BID for 2 weeks, then 40 mg daily, Disp: 120 tablet, Rfl: 0    zolpidem (AMBIEN) 5 mg tablet, Take 1 tablet (5 mg total) by mouth nightly as needed for sleep., Disp: 30 tablet, Rfl: 1    blood-glucose transmitter (DEXCOM G6 TRANSMITTER) device, USE ONE TRANSMITTER WITH /SENSORS AND REPLACE EVERY 3 MONTHS, Disp: , Rfl:     NovoLIN N NPH U-100 Insulin 100 unit/mL injection, INJECT 40 UNITS SUBCUTANEOUS DAILY AT BEDTIME, Disp: , Rfl:     Allergies   Allergen Reactions    Codeine Other (see comments)     \"Don't feel good on it\"  \"Don't feel good on it\"    Other reaction(s): Other (see comments)    Cortisone Other (see comments)     inrease in blood sugars, mood changes  inrease in blood sugars, mood changes    Oxycodone      Hallucinations     Mold Other (see comments)    Penicillins GI intolerance, Nausea And Vomiting and Other (see comments)     Other reaction(s): Other (see comments)       Past Medical History:   Diagnosis Date    Acute deep vein thrombosis (DVT) of brachial vein of right upper extremity (CMS/HCC) 8/28/2022    Allergic     Coronary artery disease     Diabetes mellitus type I (CMS/HCC)     Disease of thyroid gland     Grand mal seizure (CMS/HCC)        Past Surgical History:   Procedure Laterality Date    CARPAL TUNNEL RELEASE      FOOT SURGERY      HERNIA REPAIR      MOUTH SURGERY      OOPHORECTOMY      SINUS SURGERY      TOOTH EXTRACTION      WISDOM TOOTH EXTRACTION         Social History     Tobacco Use    Smoking status: Never    Smokeless tobacco: Never   Vaping Use    Vaping Use: Never used   Substance Use Topics    Alcohol use: No    Drug use: No       Family History   Problem Relation Age of Onset    Stroke Biological Brother     Heart disease Maternal Grandmother     Kidney disease Maternal Grandmother     Prostate cancer Paternal Grandmother         Review of " "Systems    Vitals:    08/22/24 1031   BP: 120/70   BP Location: Left upper arm   Patient Position: Sitting   Pulse: 90   Resp: 18   Temp: 36.4 °C (97.6 °F)   TempSrc: Temporal   SpO2: 98%   Weight: 85.8 kg (189 lb 3.2 oz)   Height: 1.6 m (5' 3\")       Body mass index is 33.52 kg/m².      Physical Exam  Constitutional:       Appearance: Normal appearance. She is well-developed. She is obese.   HENT:      Head: Normocephalic and atraumatic.      Right Ear: Tympanic membrane, ear canal and external ear normal.      Left Ear: Tympanic membrane, ear canal and external ear normal.      Nose: Nose normal.      Mouth/Throat:      Mouth: Mucous membranes are moist.      Pharynx: Oropharynx is clear. No oropharyngeal exudate.   Eyes:      General: No scleral icterus.        Right eye: No discharge.         Left eye: No discharge.      Conjunctiva/sclera: Conjunctivae normal.      Pupils: Pupils are equal, round, and reactive to light.   Neck:      Thyroid: No thyromegaly.      Vascular: No carotid bruit.   Cardiovascular:      Rate and Rhythm: Normal rate and regular rhythm.      Heart sounds: Normal heart sounds. No murmur heard.     No friction rub. No gallop.   Pulmonary:      Effort: Pulmonary effort is normal. No respiratory distress.      Breath sounds: Normal breath sounds. No wheezing or rales.   Chest:      Chest wall: No tenderness.   Abdominal:      General: Bowel sounds are normal. There is no distension.      Palpations: Abdomen is soft. There is no mass.      Tenderness: There is no abdominal tenderness. There is no guarding or rebound.   Musculoskeletal:      Cervical back: Normal range of motion and neck supple. No tenderness.      Right lower leg: No edema.      Left lower leg: No edema.   Lymphadenopathy:      Cervical: No cervical adenopathy.   Skin:     General: Skin is warm and dry.      Capillary Refill: Capillary refill takes less than 2 seconds.      Findings: No rash.   Neurological:      Mental " Status: She is alert and oriented to person, place, and time.      Cranial Nerves: No cranial nerve deficit.   Psychiatric:         Mood and Affect: Mood normal.         Behavior: Behavior normal.         Thought Content: Thought content normal.         Judgment: Judgment normal.            Assessment/Plan       Problem List Items Addressed This Visit          Circulatory    NSTEMI (non-ST elevated myocardial infarction) (CMS/HCC)       Endocrine/Metabolic    Hypothyroid    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 1 diabetes mellitus (CMS/HCC) - Primary    Type 1 diabetes mellitus with proliferative retinopathy (CMS/HCC)    Relevant Orders    Basic metabolic panel (Completed)       Mental Health    Current moderate episode of major depressive disorder without prior episode (CMS/HCC)       Other    Pure hypercholesterolemia     Pt with controlled type 1 dm with insulin pump and stable non-obstructive CAD who is stable for proposed cataract surgery .    Thyroid in range     She has had recent cardio eval, cath and chol is at goal     Diabetes is under better control with insulin pump           Ruba Beavers MD  8/23/2024

## 2024-08-22 NOTE — PROGRESS NOTES
"Subjective      Patient ID: Annette Montes De Oca is a 54 y.o. female.      HPI    The following have been reviewed and updated as appropriate in this visit:                Lab Results   Component Value Date    HGBA1C 7.4 (H) 05/02/2024    HGBA1C 8.0 (H) 12/29/2023    HGBA1C 8.1 (H) 12/29/2023     Lab Results   Component Value Date    MICROALBUR <3.0 12/29/2023    LDLCALC 52 06/17/2024    CREATININE 1.07 (H) 06/17/2024      Lab Results   Component Value Date    WBC 5.9 06/17/2024    HGB 12.4 06/17/2024    HCT 37.7 06/17/2024     06/17/2024    CHOL 118 06/17/2024    TRIG 75 06/17/2024    HDL 51 06/17/2024    ALT 10 06/17/2024    AST 16 06/17/2024     06/17/2024    K 5.0 06/17/2024     06/17/2024    CREATININE 1.07 (H) 06/17/2024    BUN 7 06/17/2024    CO2 22 06/17/2024    TSH 1.810 07/08/2022    HGBA1C 7.4 (H) 05/02/2024    MICROALBUR <3.0 12/29/2023    LDLCALC 52 06/17/2024       Review of Systems      Current Outpatient Medications:     aspirin 81 mg enteric coated tablet, Take 81 mg by mouth daily. 1/2 tablet, Disp: , Rfl:     atorvastatin (LIPITOR) 40 mg tablet, Take 1 tablet (40 mg total) by mouth daily., Disp: 90 tablet, Rfl: 3    azelastine (ASTELIN) 137 mcg (0.1 %) nasal spray, SPRAY 2 SPRAYS INTO EACH NOSTRIL TWICE A DAY AS DIRECTED, Disp: 30 mL, Rfl: 1    BD ULTRA-FINE SHORT PEN NEEDLE 31 gauge x 5/16\" needle, 1 EACH DAILY. BD ULTRA FINE, Disp: , Rfl:     buPROPion XL (WELLBUTRIN XL) 150 mg 24 hr tablet, TAKE 1 TABLET BY MOUTH EVERY DAY, Disp: 30 tablet, Rfl: 2    carboxymethylcellulose sodium (ARTIFICIAL TEARS, CMC, OPHT), Administer into affected eye(s)., Disp: , Rfl:     dextromethorphan-guaifenesin (MUCINEX DM)  mg tablet extended release 12 hr, Take 1 tablet by mouth every 12 (twelve) hours. Prn cough congestion ( one 1 tab q 12 hrs), Disp: 14 each, Rfl: 0    escitalopram (LEXAPRO) 10 mg tablet, TAKE 1 TABLET BY MOUTH EVERY DAY, Disp: 30 tablet, Rfl: 1    fexofenadine (ALLEGRA) 180 mg " "tablet, Take 1 tablet (180 mg total) by mouth daily., Disp: 90 tablet, Rfl: 1    insulin aspart (NovoLOG) 100 unit/mL patient supplied pump, Inject under the skin continuously., Disp: , Rfl:     levothyroxine (SYNTHROID) 75 mcg tablet, 1 TABLET ON AN EMPTY STOMACH IN THE MORNING, Disp: , Rfl: 3    mometasone furoate (NASONEX NASL), Administer into affected nostril(s)., Disp: , Rfl:     montelukast (SINGULAIR) 10 mg tablet, TAKE 1 TABLET BY MOUTH EVERY DAY AT NIGHT, Disp: 30 tablet, Rfl: 4    pantoprazole (PROTONIX) 40 mg EC tablet, Take 1 tablet (40 mg total) by mouth 2 (two) times a day. Take 40 mg BID for 2 weeks, then 40 mg daily, Disp: 120 tablet, Rfl: 0    zolpidem (AMBIEN) 5 mg tablet, Take 1 tablet (5 mg total) by mouth nightly as needed for sleep., Disp: 30 tablet, Rfl: 1    blood-glucose transmitter (DEXCOM G6 TRANSMITTER) device, USE ONE TRANSMITTER WITH /SENSORS AND REPLACE EVERY 3 MONTHS, Disp: , Rfl:     NovoLIN N NPH U-100 Insulin 100 unit/mL injection, INJECT 40 UNITS SUBCUTANEOUS DAILY AT BEDTIME, Disp: , Rfl:   Codeine, Cortisone, Oxycodone, Mold, and Penicillins  Past Medical History:   Diagnosis Date    Acute deep vein thrombosis (DVT) of brachial vein of right upper extremity (CMS/HCC) 8/28/2022    Allergic     Coronary artery disease     Diabetes mellitus type I (CMS/HCC)     Disease of thyroid gland     Grand mal seizure (CMS/HCC)      Past Surgical History:   Procedure Laterality Date    CARPAL TUNNEL RELEASE      FOOT SURGERY      HERNIA REPAIR      MOUTH SURGERY      OOPHORECTOMY      SINUS SURGERY      TOOTH EXTRACTION      WISDOM TOOTH EXTRACTION       Objective     Vitals:    08/22/24 1031   BP: 120/70   BP Location: Left upper arm   Patient Position: Sitting   Pulse: 90   Resp: 18   Temp: 36.4 °C (97.6 °F)   TempSrc: Temporal   SpO2: 98%   Weight: 85.8 kg (189 lb 3.2 oz)   Height: 1.6 m (5' 3\")       Body mass index is 33.52 kg/m².    Physical Exam    Assessment/Plan   Problem " List Items Addressed This Visit    None      There are no Patient Instructions on file for this visit.    Ruba Beavers MD

## 2024-08-23 LAB
BUN SERPL-MCNC: 13 MG/DL (ref 6–24)
BUN/CREAT SERPL: 11 (ref 9–23)
CALCIUM SERPL-MCNC: 9.9 MG/DL (ref 8.7–10.2)
CHLORIDE SERPL-SCNC: 105 MMOL/L (ref 96–106)
CO2 SERPL-SCNC: 26 MMOL/L (ref 20–29)
CREAT SERPL-MCNC: 1.21 MG/DL (ref 0.57–1)
EGFRCR SERPLBLD CKD-EPI 2021: 53 ML/MIN/1.73
GLUCOSE SERPL-MCNC: 104 MG/DL (ref 70–99)
POTASSIUM SERPL-SCNC: 5.1 MMOL/L (ref 3.5–5.2)
SODIUM SERPL-SCNC: 142 MMOL/L (ref 134–144)

## 2024-09-08 DIAGNOSIS — J30.2 SEASONAL ALLERGIES: Primary | ICD-10-CM

## 2024-09-09 RX ORDER — MONTELUKAST SODIUM 10 MG/1
10 TABLET ORAL NIGHTLY
Qty: 30 TABLET | Refills: 5 | Status: SHIPPED | OUTPATIENT
Start: 2024-09-09 | End: 2025-03-12

## 2024-09-16 DIAGNOSIS — J30.2 SEASONAL ALLERGIES: Primary | ICD-10-CM

## 2024-09-17 ENCOUNTER — OFFICE VISIT (OUTPATIENT)
Dept: PRIMARY CARE | Facility: CLINIC | Age: 54
End: 2024-09-17
Payer: COMMERCIAL

## 2024-09-17 VITALS
OXYGEN SATURATION: 96 % | TEMPERATURE: 97.8 F | BODY MASS INDEX: 32.6 KG/M2 | RESPIRATION RATE: 18 BRPM | DIASTOLIC BLOOD PRESSURE: 62 MMHG | HEIGHT: 63 IN | SYSTOLIC BLOOD PRESSURE: 120 MMHG | HEART RATE: 88 BPM | WEIGHT: 184 LBS

## 2024-09-17 DIAGNOSIS — J06.9 UPPER RESPIRATORY TRACT INFECTION, UNSPECIFIED TYPE: Primary | ICD-10-CM

## 2024-09-17 DIAGNOSIS — H65.02 ACUTE SEROUS OTITIS MEDIA OF LEFT EAR, RECURRENCE NOT SPECIFIED: ICD-10-CM

## 2024-09-17 PROCEDURE — 3008F BODY MASS INDEX DOCD: CPT | Performed by: INTERNAL MEDICINE

## 2024-09-17 PROCEDURE — 99213 OFFICE O/P EST LOW 20 MIN: CPT | Performed by: INTERNAL MEDICINE

## 2024-09-17 RX ORDER — MINERAL OIL
180 ENEMA (ML) RECTAL DAILY
Qty: 30 TABLET | Refills: 5 | Status: SHIPPED | OUTPATIENT
Start: 2024-09-17 | End: 2025-03-31

## 2024-09-17 RX ORDER — AZITHROMYCIN 250 MG/1
TABLET, FILM COATED ORAL
Qty: 6 TABLET | Refills: 0 | Status: SHIPPED | OUTPATIENT
Start: 2024-09-17 | End: 2024-09-22

## 2024-09-17 RX ORDER — AZELASTINE 1 MG/ML
SPRAY, METERED NASAL
Qty: 30 ML | Refills: 5 | Status: SHIPPED | OUTPATIENT
Start: 2024-09-17

## 2024-09-17 ASSESSMENT — ENCOUNTER SYMPTOMS
SHORTNESS OF BREATH: 0
ENDOCRINE NEGATIVE: 1
WEAKNESS: 0
HEADACHES: 0
EYE PAIN: 0
FEVER: 0
DIZZINESS: 0
CHEST TIGHTNESS: 0
PALPITATIONS: 0
ACTIVITY CHANGE: 0
FATIGUE: 0
COUGH: 1
APPETITE CHANGE: 0

## 2024-09-17 NOTE — PROGRESS NOTES
"  Subjective     Patient ID: Annette Montes De Oca is a 54 y.o. female.    Earache / Otalgia (Sx started a week ago, she has PND, that she states dripped into her ear. She has a headache and congestion. No Covid test, did have covid in the summer. Has a history of ear infections. L ear is the one that hurts.)              Review of Systems   Constitutional:  Negative for activity change, appetite change, fatigue and fever.   HENT:  Positive for congestion, ear pain (L) and postnasal drip.    Eyes:  Negative for pain and visual disturbance.   Respiratory:  Positive for cough. Negative for chest tightness and shortness of breath.    Cardiovascular:  Negative for chest pain and palpitations.   Endocrine: Negative.    Neurological:  Negative for dizziness, weakness and headaches.       Current Outpatient Medications   Medication Sig Dispense Refill    aspirin 81 mg enteric coated tablet Take 81 mg by mouth daily. 1/2 tablet      atorvastatin (LIPITOR) 40 mg tablet Take 1 tablet (40 mg total) by mouth daily. 90 tablet 3    azelastine (ASTELIN) 137 mcg (0.1 %) nasal spray SPRAY 2 SPRAYS INTO EACH NOSTRIL TWICE A DAY AS DIRECTED 30 mL 1    azithromycin (ZITHROMAX) 250 mg tablet Take 2 tablets the first day, then 1 tablet daily for 4 days. 6 tablet 0    BD ULTRA-FINE SHORT PEN NEEDLE 31 gauge x 5/16\" needle 1 EACH DAILY. BD ULTRA FINE      blood-glucose transmitter (DEXCOM G6 TRANSMITTER) device USE ONE TRANSMITTER WITH /SENSORS AND REPLACE EVERY 3 MONTHS      buPROPion XL (WELLBUTRIN XL) 150 mg 24 hr tablet TAKE 1 TABLET BY MOUTH EVERY DAY 30 tablet 2    carboxymethylcellulose sodium (ARTIFICIAL TEARS, CMC, OPHT) Administer into affected eye(s).      dextromethorphan-guaifenesin (MUCINEX DM)  mg tablet extended release 12 hr Take 1 tablet by mouth every 12 (twelve) hours. Prn cough congestion ( one 1 tab q 12 hrs) 14 each 0    escitalopram (LEXAPRO) 10 mg tablet TAKE 1 TABLET BY MOUTH EVERY DAY 30 tablet 1    " fexofenadine (ALLEGRA) 180 mg tablet Take 1 tablet (180 mg total) by mouth daily. 90 tablet 1    insulin aspart (NovoLOG) 100 unit/mL patient supplied pump Inject under the skin continuously.      levothyroxine (SYNTHROID) 75 mcg tablet 1 TABLET ON AN EMPTY STOMACH IN THE MORNING  3    mometasone furoate (NASONEX NASL) Administer into affected nostril(s).      montelukast (SINGULAIR) 10 mg tablet Take 1 tablet (10 mg total) by mouth nightly. 30 tablet 5    pantoprazole (PROTONIX) 40 mg EC tablet Take 1 tablet (40 mg total) by mouth 2 (two) times a day. Take 40 mg BID for 2 weeks, then 40 mg daily 120 tablet 0    zolpidem (AMBIEN) 5 mg tablet Take 1 tablet (5 mg total) by mouth nightly as needed for sleep. 30 tablet 1    NovoLIN N NPH U-100 Insulin 100 unit/mL injection INJECT 40 UNITS SUBCUTANEOUS DAILY AT BEDTIME (Patient not taking: Reported on 9/17/2024)       No current facility-administered medications for this visit.     Past Medical History:   Diagnosis Date    Acute deep vein thrombosis (DVT) of brachial vein of right upper extremity (CMS/HCC) 8/28/2022    Allergic     Coronary artery disease     Diabetes mellitus type I (CMS/HCC)     Disease of thyroid gland     Grand mal seizure (CMS/HCC)      Family History   Problem Relation Name Age of Onset    Stroke Biological Brother      Heart disease Maternal Grandmother      Kidney disease Maternal Grandmother      Prostate cancer Paternal Grandmother       Past Surgical History   Procedure Laterality Date    Carpal tunnel release      Foot surgery      Hernia repair      iFR - initial vessel N/A 6/25/2024    [facility]    LEFT HEART CATH WITH CORONARY ANGIOGRAPHY N/A 6/25/2024    [facility] [place] [facility]    Mouth surgery      Oophorectomy      Sinus surgery      Tooth extraction      [place] tooth extraction       Social History     Socioeconomic History    Marital status:      Spouse  "name: Not on file    Number of children: Not on file    Years of education: Not on file    Highest education level: Not on file   Occupational History    Not on file   Tobacco Use    Smoking status: Never    Smokeless tobacco: Never   Vaping Use    Vaping status: Never Used   Substance and Sexual Activity    Alcohol use: No    Drug use: No    Sexual activity: Not Currently   Other Topics Concern    Not on file   Social History Narrative    [employer]     Social Drivers of Health     Financial Resource Strain: Not on file   Food Insecurity: Not on file   Transportation Needs: Not on file   Physical Activity: Not on file   Stress: Not on file   Social Connections: Not on file   Intimate Partner Violence: Not on file   Housing Stability: Not on file     Allergies   Allergen Reactions    Codeine Other (see comments)     \"Don't feel good on it\"  \"Don't feel good on it\"    Other reaction(s): Other (see comments)    Cortisone Other (see comments)     inrease in blood sugars, mood changes  inrease in blood sugars, mood changes    Oxycodone      Hallucinations     Mold Other (see comments)    Penicillins GI intolerance, Nausea And Vomiting and Other (see comments)     Other reaction(s): Other (see comments)       Objective     Vitals:    09/17/24 1447   BP: 120/62   Pulse: 88   Resp: 18   Temp: 36.6 °C (97.8 °F)   SpO2: 96%   Weight: 83.5 kg (184 lb)   Height: 1.6 m (5' 3\")     Body mass index is 32.59 kg/m².    Physical Exam  Vitals and nursing note reviewed.   Constitutional:       Appearance: She is well-developed.   HENT:      Head: Normocephalic and atraumatic.   Eyes:      Pupils: Pupils are equal, round, and reactive to light.   Cardiovascular:      Rate and Rhythm: Normal rate and regular rhythm.   Pulmonary:      Effort: Pulmonary effort is normal.      Breath sounds: Normal breath sounds.   Musculoskeletal:      Cervical back: Normal range of motion.   Skin:     General: Skin is warm and dry.   Neurological:      " Mental Status: She is alert and oriented to person, place, and time.         Assessment/Plan   Problem List Items Addressed This Visit          Infectious/Inflammatory    Upper respiratory tract infection - Primary    Relevant Medications    azithromycin (ZITHROMAX) 250 mg tablet    Acute serous otitis media of left ear     No orders of the defined types were placed in this encounter.

## 2024-09-17 NOTE — ASSESSMENT & PLAN NOTE
OTC meds for symptom management reviewed. Increase fluids and rest. Notify me with failure to improve or worsening of symptoms. abx to be started.

## 2024-09-25 RX ORDER — ZOLPIDEM TARTRATE 5 MG/1
5 TABLET ORAL NIGHTLY PRN
Qty: 30 TABLET | Refills: 1 | Status: SHIPPED | OUTPATIENT
Start: 2024-09-25 | End: 2024-12-20

## 2024-10-14 DIAGNOSIS — F41.9 ANXIETY: ICD-10-CM

## 2024-10-14 DIAGNOSIS — F32.1 CURRENT MODERATE EPISODE OF MAJOR DEPRESSIVE DISORDER WITHOUT PRIOR EPISODE (CMS/HCC): ICD-10-CM

## 2024-10-14 RX ORDER — ESCITALOPRAM OXALATE 10 MG/1
10 TABLET ORAL DAILY
Qty: 30 TABLET | Refills: 1 | Status: SHIPPED | OUTPATIENT
Start: 2024-10-14 | End: 2024-12-09

## 2024-10-14 RX ORDER — BUPROPION HYDROCHLORIDE 150 MG/1
TABLET ORAL
Qty: 30 TABLET | Refills: 2 | Status: SHIPPED | OUTPATIENT
Start: 2024-10-14 | End: 2025-01-27

## 2024-10-21 ENCOUNTER — TELEPHONE (OUTPATIENT)
Dept: PRIMARY CARE | Facility: CLINIC | Age: 54
End: 2024-10-21
Payer: COMMERCIAL

## 2024-10-21 DIAGNOSIS — K21.9 GASTROESOPHAGEAL REFLUX DISEASE WITHOUT ESOPHAGITIS: Primary | ICD-10-CM

## 2024-10-21 RX ORDER — PANTOPRAZOLE SODIUM 40 MG/1
40 TABLET, DELAYED RELEASE ORAL DAILY
Qty: 90 TABLET | Refills: 0 | Status: SHIPPED | OUTPATIENT
Start: 2024-10-21 | End: 2025-01-06

## 2024-12-08 DIAGNOSIS — F41.9 ANXIETY: ICD-10-CM

## 2024-12-09 RX ORDER — ESCITALOPRAM OXALATE 10 MG/1
10 TABLET ORAL DAILY
Qty: 30 TABLET | Refills: 1 | Status: SHIPPED | OUTPATIENT
Start: 2024-12-09 | End: 2025-02-25 | Stop reason: SDUPTHER

## 2024-12-20 DIAGNOSIS — K21.9 GASTROESOPHAGEAL REFLUX DISEASE WITHOUT ESOPHAGITIS: ICD-10-CM

## 2024-12-20 RX ORDER — PANTOPRAZOLE SODIUM 40 MG/1
40 TABLET, DELAYED RELEASE ORAL DAILY
Qty: 30 TABLET | Refills: 2 | OUTPATIENT
Start: 2024-12-20

## 2024-12-20 RX ORDER — ZOLPIDEM TARTRATE 5 MG/1
5 TABLET ORAL NIGHTLY PRN
Qty: 30 TABLET | Refills: 1 | Status: SHIPPED | OUTPATIENT
Start: 2024-12-20

## 2024-12-20 NOTE — TELEPHONE ENCOUNTER
"Medication zolpidem (AMBIEN) 5 mg tablet   Filled on 11.8.24  # 30  PDMP checked with no red flags     Medicine Refill Request    Last Office Visit: 9/17/2024   Last Consult Visit: 8/22/2024  Last Telemedicine Visit: 6/29/2024 Lucia Guerra CRNP    Next Appointment: 2/25/2025      Current Outpatient Medications:     aspirin 81 mg enteric coated tablet, Take 81 mg by mouth daily. 1/2 tablet, Disp: , Rfl:     atorvastatin (LIPITOR) 40 mg tablet, Take 1 tablet (40 mg total) by mouth daily., Disp: 90 tablet, Rfl: 3    azelastine (ASTELIN) 137 mcg (0.1 %) nasal spray, SPRAY 2 SPRAYS INTO EACH NOSTRIL TWICE A DAY AS DIRECTED, Disp: 30 mL, Rfl: 5    BD ULTRA-FINE SHORT PEN NEEDLE 31 gauge x 5/16\" needle, 1 EACH DAILY. BD ULTRA FINE, Disp: , Rfl:     blood-glucose transmitter (DEXCOM G6 TRANSMITTER) device, USE ONE TRANSMITTER WITH /SENSORS AND REPLACE EVERY 3 MONTHS, Disp: , Rfl:     buPROPion XL (WELLBUTRIN XL) 150 mg 24 hr tablet, TAKE 1 TABLET BY MOUTH EVERY DAY, Disp: 30 tablet, Rfl: 2    carboxymethylcellulose sodium (ARTIFICIAL TEARS, CMC, OPHT), Administer into affected eye(s)., Disp: , Rfl:     dextromethorphan-guaifenesin (MUCINEX DM)  mg tablet extended release 12 hr, Take 1 tablet by mouth every 12 (twelve) hours. Prn cough congestion ( one 1 tab q 12 hrs), Disp: 14 each, Rfl: 0    escitalopram (LEXAPRO) 10 mg tablet, TAKE 1 TABLET BY MOUTH EVERY DAY, Disp: 30 tablet, Rfl: 1    fexofenadine (ALLEGRA) 180 mg tablet, Take 1 tablet (180 mg total) by mouth daily., Disp: 30 tablet, Rfl: 5    insulin aspart (NovoLOG) 100 unit/mL patient supplied pump, Inject under the skin continuously., Disp: , Rfl:     levothyroxine (SYNTHROID) 75 mcg tablet, 1 TABLET ON AN EMPTY STOMACH IN THE MORNING, Disp: , Rfl: 3    mometasone furoate (NASONEX NASL), Administer into affected nostril(s)., Disp: , Rfl:     montelukast (SINGULAIR) 10 mg tablet, Take 1 tablet (10 mg total) by mouth nightly., Disp: 30 tablet, Rfl: " 5    NovoLIN N NPH U-100 Insulin 100 unit/mL injection, INJECT 40 UNITS SUBCUTANEOUS DAILY AT BEDTIME (Patient not taking: Reported on 9/17/2024), Disp: , Rfl:     pantoprazole (PROTONIX) 40 mg EC tablet, Take 1 tablet (40 mg total) by mouth daily., Disp: 90 tablet, Rfl: 0    zolpidem (AMBIEN) 5 mg tablet, TAKE 1 TABLET BY MOUTH NIGHTLY AS NEEDED FOR SLEEP, Disp: 30 tablet, Rfl: 1      BP Readings from Last 3 Encounters:   09/17/24 120/62   08/22/24 120/70   07/30/24 (!) 142/78       Recent Lab results:  Lab Results   Component Value Date    CHOL 118 06/17/2024   ,   Lab Results   Component Value Date    HDL 51 06/17/2024   ,   Lab Results   Component Value Date    LDLCALC 52 06/17/2024   ,   Lab Results   Component Value Date    TRIG 75 06/17/2024        Lab Results   Component Value Date    GLUCOSE 104 (H) 08/22/2024   ,   Lab Results   Component Value Date    HGBA1C 7.4 (H) 05/02/2024         Lab Results   Component Value Date    CREATININE 1.21 (H) 08/22/2024       Lab Results   Component Value Date    TSH 1.810 07/08/2022           Lab Results   Component Value Date    HGBA1C 7.4 (H) 05/02/2024

## 2024-12-27 ENCOUNTER — TELEPHONE (OUTPATIENT)
Dept: CARDIOLOGY | Facility: CLINIC | Age: 54
End: 2024-12-27
Payer: COMMERCIAL

## 2024-12-27 NOTE — TELEPHONE ENCOUNTER
----- Message from Sadia Pack sent at 6/26/2024 12:13 PM EDT -----  Regarding: RE: Appointment/coronary artery CTA/lipids and LFTs  Orders are placed. Blood work attached.   ----- Message -----  From: Diamante Johnson MD  Sent: 6/26/2024  11:47 AM EDT  To: Sadia Pack MA; Radha Sanford  Subject: Appointment/coronary artery CTA/lipids and L#    She needs an appointment in 18 months with coronary artery CTA prior    She needs lipids and LFTs in a year

## 2025-01-01 LAB
ALBUMIN SERPL-MCNC: 4.1 G/DL (ref 3.8–4.9)
ALP SERPL-CCNC: 105 IU/L (ref 44–121)
ALT SERPL-CCNC: 13 IU/L (ref 0–32)
AST SERPL-CCNC: 19 IU/L (ref 0–40)
BILIRUB DIRECT SERPL-MCNC: 0.15 MG/DL (ref 0–0.4)
BILIRUB SERPL-MCNC: 0.3 MG/DL (ref 0–1.2)
CHOLEST SERPL-MCNC: 146 MG/DL (ref 100–199)
HDLC SERPL-MCNC: 55 MG/DL
LDLC SERPL CALC-MCNC: 71 MG/DL (ref 0–99)
PROT SERPL-MCNC: 6 G/DL (ref 6–8.5)
TRIGL SERPL-MCNC: 112 MG/DL (ref 0–149)
VLDLC SERPL CALC-MCNC: 20 MG/DL (ref 5–40)

## 2025-01-02 DIAGNOSIS — E78.00 PURE HYPERCHOLESTEROLEMIA: Primary | ICD-10-CM

## 2025-01-02 RX ORDER — ATORVASTATIN CALCIUM 80 MG/1
80 TABLET, FILM COATED ORAL DAILY
Qty: 90 TABLET | Refills: 3 | Status: SHIPPED | OUTPATIENT
Start: 2025-01-02 | End: 2025-12-28

## 2025-01-02 NOTE — TELEPHONE ENCOUNTER
----- Message from Diamante Johnson sent at 1/2/2025 11:52 AM EST -----  Regarding: Lipitor/lipids and LFTs  Her LDL is gone from 51-72.  We need to double her Lipitor and repeat lipids and LFTs in 3 months.

## 2025-01-02 NOTE — TELEPHONE ENCOUNTER
Spoke with patient. Patient aware Dr. Johnson is increasing her Lipitor to 80mg; and would like repeat Lipid Panel/LFT x 3mo. Also, lab slips mailed to patient.

## 2025-01-06 DIAGNOSIS — R94.39 ABNORMAL STRESS ECG: Primary | ICD-10-CM

## 2025-01-06 DIAGNOSIS — I21.4 NSTEMI (NON-ST ELEVATED MYOCARDIAL INFARCTION) (CMS/HCC): ICD-10-CM

## 2025-01-06 DIAGNOSIS — R93.1 ELEVATED CORONARY ARTERY CALCIUM SCORE: ICD-10-CM

## 2025-01-06 DIAGNOSIS — K21.9 GASTROESOPHAGEAL REFLUX DISEASE WITHOUT ESOPHAGITIS: ICD-10-CM

## 2025-01-06 RX ORDER — PANTOPRAZOLE SODIUM 40 MG/1
40 TABLET, DELAYED RELEASE ORAL DAILY
Qty: 30 TABLET | Refills: 2 | Status: SHIPPED | OUTPATIENT
Start: 2025-01-06 | End: 2025-02-25 | Stop reason: SDUPTHER

## 2025-01-09 ENCOUNTER — TELEPHONE (OUTPATIENT)
Dept: SCHEDULING | Facility: CLINIC | Age: 55
End: 2025-01-09
Payer: COMMERCIAL

## 2025-01-09 NOTE — TELEPHONE ENCOUNTER
[employer] Auth Scheduling:     Name: Annette Montes De Oca    Auth Location:     Auth Number: [id number]    Valid dates: 01/09/2025 - 04/08/2025    Procedure: CTA    Scheduling notes:Please call pt thank you!

## 2025-01-09 NOTE — TELEPHONE ENCOUNTER
LVM  to inform PT that their CT order was authorized and they can proceed to schedule their appointment.    A  msg was also sent

## 2025-01-15 NOTE — TELEPHONE ENCOUNTER
LVM  to inform PT that their CT order was authorized and they can proceed to schedule their appointment.

## 2025-01-24 LAB — MLHC DIABETIC EYE EXAM (EXTERNAL): NORMAL

## 2025-01-26 DIAGNOSIS — F41.9 ANXIETY: ICD-10-CM

## 2025-01-26 DIAGNOSIS — F32.1 CURRENT MODERATE EPISODE OF MAJOR DEPRESSIVE DISORDER WITHOUT PRIOR EPISODE (CMS/HCC): ICD-10-CM

## 2025-01-27 RX ORDER — BUPROPION HYDROCHLORIDE 150 MG/1
TABLET ORAL
Qty: 30 TABLET | Refills: 0 | Status: SHIPPED | OUTPATIENT
Start: 2025-01-27 | End: 2025-02-25 | Stop reason: SDUPTHER

## 2025-02-24 PROBLEM — H65.02 ACUTE SEROUS OTITIS MEDIA OF LEFT EAR: Status: RESOLVED | Noted: 2024-09-17 | Resolved: 2025-02-24

## 2025-02-25 ENCOUNTER — OFFICE VISIT (OUTPATIENT)
Dept: PRIMARY CARE | Facility: CLINIC | Age: 55
End: 2025-02-25
Payer: COMMERCIAL

## 2025-02-25 VITALS
WEIGHT: 170 LBS | DIASTOLIC BLOOD PRESSURE: 68 MMHG | RESPIRATION RATE: 18 BRPM | SYSTOLIC BLOOD PRESSURE: 124 MMHG | TEMPERATURE: 97.2 F | OXYGEN SATURATION: 97 % | BODY MASS INDEX: 30.11 KG/M2 | HEART RATE: 89 BPM

## 2025-02-25 DIAGNOSIS — J02.9 SORE THROAT: ICD-10-CM

## 2025-02-25 DIAGNOSIS — E10.3599 TYPE 1 DIABETES MELLITUS WITH PROLIFERATIVE RETINOPATHY, MACULAR EDEMA PRESENCE UNSPECIFIED, UNSPECIFIED LATERALITY, UNSPECIFIED PROLIFERATIVE RETINOPATHY TYPE (CMS/HCC): Primary | ICD-10-CM

## 2025-02-25 DIAGNOSIS — Z12.31 ENCOUNTER FOR SCREENING MAMMOGRAM FOR MALIGNANT NEOPLASM OF BREAST: ICD-10-CM

## 2025-02-25 DIAGNOSIS — F41.9 ANXIETY: ICD-10-CM

## 2025-02-25 DIAGNOSIS — I21.4 NSTEMI (NON-ST ELEVATED MYOCARDIAL INFARCTION) (CMS/HCC): ICD-10-CM

## 2025-02-25 DIAGNOSIS — E10.3553 STABLE PROLIFERATIVE DIABETIC RETINOPATHY OF BOTH EYES ASSOCIATED WITH TYPE 1 DIABETES MELLITUS (CMS/HCC): ICD-10-CM

## 2025-02-25 DIAGNOSIS — F32.1 CURRENT MODERATE EPISODE OF MAJOR DEPRESSIVE DISORDER WITHOUT PRIOR EPISODE (CMS/HCC): ICD-10-CM

## 2025-02-25 DIAGNOSIS — K21.9 GASTROESOPHAGEAL REFLUX DISEASE WITHOUT ESOPHAGITIS: ICD-10-CM

## 2025-02-25 DIAGNOSIS — E03.9 ACQUIRED HYPOTHYROIDISM: ICD-10-CM

## 2025-02-25 DIAGNOSIS — E78.00 PURE HYPERCHOLESTEROLEMIA: ICD-10-CM

## 2025-02-25 LAB
EXPIRATION DATE: NORMAL
Lab: NORMAL
POCT MANUFACTURER: NORMAL
S PYO AG THROAT QL: NEGATIVE

## 2025-02-25 PROCEDURE — 87637 SARSCOV2&INF A&B&RSV AMP PRB: CPT | Performed by: FAMILY MEDICINE

## 2025-02-25 PROCEDURE — 87880 STREP A ASSAY W/OPTIC: CPT | Performed by: FAMILY MEDICINE

## 2025-02-25 PROCEDURE — 3008F BODY MASS INDEX DOCD: CPT | Performed by: FAMILY MEDICINE

## 2025-02-25 PROCEDURE — 99214 OFFICE O/P EST MOD 30 MIN: CPT | Performed by: FAMILY MEDICINE

## 2025-02-25 RX ORDER — BUPROPION HYDROCHLORIDE 150 MG/1
150 TABLET ORAL DAILY
Qty: 90 TABLET | Refills: 1 | Status: SHIPPED | OUTPATIENT
Start: 2025-02-25 | End: 2025-03-12

## 2025-02-25 RX ORDER — PANTOPRAZOLE SODIUM 40 MG/1
40 TABLET, DELAYED RELEASE ORAL DAILY
Qty: 90 TABLET | Refills: 1 | Status: SHIPPED | OUTPATIENT
Start: 2025-02-25

## 2025-02-25 RX ORDER — ESCITALOPRAM OXALATE 10 MG/1
10 TABLET ORAL DAILY
Qty: 90 TABLET | Refills: 1 | Status: SHIPPED | OUTPATIENT
Start: 2025-02-25

## 2025-02-25 NOTE — PROGRESS NOTES
"Subjective      Patient ID: Annette Montes De Oca is a 55 y.o. female.    HPI    The following have been reviewed and updated as appropriate in this visit:     Tobacco  Allergies  Meds  Problems  Med Hx  Surg Hx  Fam Hx       Pt with diabetes ( type 1), hypothyroidism,  CAD depression high chol presents for routine followup.        sore throat little congestion no fever no Cough no SOB     Endo Dr Vega -  has insulin pump    She is frustrated has not been able to get roula sensors from endo office but there is now rx waiting for her at pharmacy     Last hga1c was higher per endo 9.5  had been better at 7.4    [place]  MAMMOGRAM GYN had  ORDERD      Cardio Dr Diamante Johnson   has followup in April 2025   LHC/CORONARY ANGIOGRAM FINDINGS:  1. Non-obstructive coronary artery disease.  2. Mid LAD with 40-50% disease. IFR negative 0.92.  RECOMMENDATIONS  1. Medical therapy optimization.  2. Cardiovascular risk factor modification.         Lab Results   Component Value Date    WBC 5.9 06/17/2024    HGB 12.4 06/17/2024    HCT 37.7 06/17/2024     06/17/2024    CHOL 146 12/31/2024    TRIG 112 12/31/2024    HDL 55 12/31/2024    ALT 13 12/31/2024    AST 19 12/31/2024     08/22/2024    K 5.1 08/22/2024     08/22/2024    CREATININE 1.21 (H) 08/22/2024    BUN 13 08/22/2024    CO2 26 08/22/2024    TSH 1.810 07/08/2022    HGBA1C 9.5 (H) 11/22/2024    MICROALBUR <3.0 12/29/2023    LDLCALC 71 12/31/2024       Review of Systems      Current Outpatient Medications:     aspirin 81 mg enteric coated tablet, Take 81 mg by mouth daily. 1/2 tablet, Disp: , Rfl:     atorvastatin (LIPITOR) 80 mg tablet, Take 1 tablet (80 mg total) by mouth daily., Disp: 90 tablet, Rfl: 3    azelastine (ASTELIN) 137 mcg (0.1 %) nasal spray, SPRAY 2 SPRAYS INTO EACH NOSTRIL TWICE A DAY AS DIRECTED, Disp: 30 mL, Rfl: 5    BD ULTRA-FINE SHORT PEN NEEDLE 31 gauge x 5/16\" needle, 1 EACH DAILY. BD ULTRA FINE, Disp: , Rfl:     blood-glucose " June 9, 2023      Tavon Burgos  [address] [facility] [address]        Dear ,    We are writing to inform you of your test results.    The blood sugar called glucose was a little bit higher than ideal, this does not mean you have diabetes but it is a good reminder to try to cut back on sugar and carbohydrates in your diet along with getting regular exercise.     All of the other labs fall into a normal range including electrolytes, kidney function and liver function.  The testosterone level is well within the normal range.  The thyroid gland function test (TSH) is normal.  The blood counts including red blood cells, white blood cells, platelets as well as all associated blood count measurements are normal.     I recommend at this time that we proceed with the sleep evaluation as we discussed.     Resulted Orders   Comprehensive metabolic panel   Result Value Ref Range    Sodium 137 136 - 145 mmol/L    Potassium 4.5 3.4 - 5.3 mmol/L    Chloride 103 98 - 107 mmol/L    Carbon Dioxide (CO2) 20 (L) 22 - 29 mmol/L    Anion Gap 14 7 - 15 mmol/L    Urea Nitrogen 15.0 6.0 - 20.0 mg/dL    Creatinine 1.06 0.67 - 1.17 mg/dL    Calcium 9.5 8.6 - 10.0 mg/dL    Glucose 108 (H) 70 - 99 mg/dL    Alkaline Phosphatase 75 40 - 129 U/L    AST 29 10 - 50 U/L    ALT 31 10 - 50 U/L    Protein Total 8.0 6.4 - 8.3 g/dL    Albumin 4.4 3.5 - 5.2 g/dL    Bilirubin Total 0.5 <=1.2 mg/dL    GFR Estimate >90 >60 mL/min/1.73m2      Comment:      eGFR calculated using 2021 CKD-EPI equation.   TSH with free T4 reflex   Result Value Ref Range    TSH 0.92 0.30 - 4.20 uIU/mL   Sex Hormone Binding Globulin   Result Value Ref Range    Sex Hormone Binding Globulin 51 11 - 80 nmol/L   Testosterone Free and Total   Result Value Ref Range    Free Testosterone Calculated 9.20 ng/dL      Comment:      Male Hayes Ranges:  Hayes Stage I: Less than or equal to 0.37 ng/dL  Hayes Stage II: 0.03-2.1 ng/dL  Hayes Stage III: 0.10-9.8  ng/dL  Hayes Stage IV: 3.5-16.9 ng/dL  Hayes Stage V: 4.1-23.9 ng/dL    Testosterone Total 571 240 - 950 ng/dL    Narrative    This test was developed and its performance characteristics determined by the [facility],  Special Chemistry Laboratory. It has not been cleared or approved by the FDA. The laboratory is regulated under CLIA as qualified to perform high-complexity testing. This test is used for clinical purposes. It should not be regarded as investigational or for research.   CBC with platelets and differential   Result Value Ref Range    WBC Count 7.7 4.0 - 11.0 10e3/uL    RBC Count 4.98 4.40 - 5.90 10e6/uL    Hemoglobin 15.3 13.3 - 17.7 g/dL    Hematocrit 42.2 40.0 - 53.0 %    MCV 85 78 - 100 fL    MCH 30.7 26.5 - 33.0 pg    MCHC 36.3 31.5 - 36.5 g/dL    RDW 12.2 10.0 - 15.0 %    Platelet Count 438 150 - 450 10e3/uL    % Neutrophils 48 %    % Lymphocytes 34 %    % Monocytes 11 %    % Eosinophils 6 %    % Basophils 1 %    % Immature Granulocytes 0 %    Absolute Neutrophils 3.7 1.6 - 8.3 10e3/uL    Absolute Lymphocytes 2.6 0.8 - 5.3 10e3/uL    Absolute Monocytes 0.9 0.0 - 1.3 10e3/uL    Absolute Eosinophils 0.5 0.0 - 0.7 10e3/uL    Absolute Basophils 0.0 0.0 - 0.2 10e3/uL    Absolute Immature Granulocytes 0.0 <=0.4 10e3/uL       If you have any questions or concerns, please call the clinic at the number listed above.       Sincerely,      Anibal Staton MD             transmitter (DEXCOM G6 TRANSMITTER) device, USE ONE TRANSMITTER WITH /SENSORS AND REPLACE EVERY 3 MONTHS, Disp: , Rfl:     buPROPion XL (WELLBUTRIN XL) 150 mg 24 hr tablet, Take 1 tablet (150 mg total) by mouth daily., Disp: 90 tablet, Rfl: 1    carboxymethylcellulose sodium (ARTIFICIAL TEARS, CMC, OPHT), Administer into affected eye(s)., Disp: , Rfl:     dextromethorphan-guaifenesin (MUCINEX DM)  mg tablet extended release 12 hr, Take 1 tablet by mouth every 12 (twelve) hours. Prn cough congestion ( one 1 tab q 12 hrs), Disp: 14 each, Rfl: 0    escitalopram (LEXAPRO) 10 mg tablet, Take 1 tablet (10 mg total) by mouth daily., Disp: 90 tablet, Rfl: 1    fexofenadine (ALLEGRA) 180 mg tablet, Take 1 tablet (180 mg total) by mouth daily., Disp: 30 tablet, Rfl: 5    insulin aspart (NovoLOG) 100 unit/mL patient supplied pump, Inject under the skin continuously., Disp: , Rfl:     levothyroxine (SYNTHROID) 75 mcg tablet, 1 TABLET ON AN EMPTY STOMACH IN THE MORNING, Disp: , Rfl: 3    mometasone furoate (NASONEX NASL), Administer into affected nostril(s)., Disp: , Rfl:     montelukast (SINGULAIR) 10 mg tablet, Take 1 tablet (10 mg total) by mouth nightly., Disp: 30 tablet, Rfl: 5    pantoprazole (PROTONIX) 40 mg EC tablet, Take 1 tablet (40 mg total) by mouth daily., Disp: 90 tablet, Rfl: 1    zolpidem (AMBIEN) 5 mg tablet, TAKE 1 TABLET BY MOUTH EVERY DAY AT BEDTIME AS NEEDED FOR SLEEP, Disp: 30 tablet, Rfl: 1    NovoLIN N NPH U-100 Insulin 100 unit/mL injection, INJECT 40 UNITS SUBCUTANEOUS DAILY AT BEDTIME (Patient not taking: Reported on 2/25/2025), Disp: , Rfl:   Codeine, Cortisone, Oxycodone, Mold, and Penicillins  Past Medical History:   Diagnosis Date    Acute deep vein thrombosis (DVT) of brachial vein of right upper extremity (CMS/HCC) 8/28/2022    Allergic     Coronary artery disease     Diabetes mellitus type I (CMS/HCC)     Disease of thyroid gland     Grand mal seizure (CMS/HCC)      Past Surgical  History   Procedure Laterality Date    Carpal tunnel release      Foot surgery      Hernia repair      iFR - initial vessel N/A 6/25/2024    [place] [facility] [place]    LEFT HEART CATH WITH CORONARY ANGIOGRAPHY N/A 6/25/2024    [place] [facility] [place]    Mouth surgery      Oophorectomy      Sinus surgery      Tooth extraction      [place] tooth extraction       Objective     Vitals:    02/25/25 1044   BP: 124/68   BP Location: Right upper arm   Patient Position: Sitting   Pulse: 89   Resp: 18   Temp: 36.2 °C (97.2 °F)   TempSrc: Temporal   SpO2: 97%   Weight: 77.1 kg (170 lb)       Body mass index is 30.11 kg/m².    Physical Exam  Constitutional:       General: She is not in acute distress.     Appearance: Normal appearance. She is well-developed. She is not toxic-appearing.      Comments: congested   HENT:      Head: Normocephalic.      Right Ear: Tympanic membrane, ear canal and external ear normal.      Left Ear: Tympanic membrane, ear canal and external ear normal.      Nose: Congestion present. Mucosal edema: clear nasal discharge.     Mouth/Throat:      Pharynx: Uvula midline. Posterior oropharyngeal erythema present. No oropharyngeal exudate.      Tonsils: No tonsillar exudate.   Eyes:      General: Lids are normal. No scleral icterus.     Conjunctiva/sclera: Conjunctivae normal.      Right eye: Right conjunctiva is not injected.      Left eye: Left conjunctiva is not injected.   Neck:      Thyroid: No thyroid mass.      Vascular: No carotid bruit.   Cardiovascular:      Rate and Rhythm: Normal rate and regular rhythm.      Heart sounds: Normal heart sounds, S1 normal and S2 normal.      No gallop.   Pulmonary:      Effort: No tachypnea or respiratory distress.      Breath sounds: Normal breath sounds. No decreased breath sounds, wheezing, rhonchi or rales.   Musculoskeletal:      Cervical back: Normal range of motion and neck supple.    Lymphadenopathy:      Head:      Right side of head: No submental, submandibular or preauricular adenopathy.      Left side of head: No submental or submandibular adenopathy.      Cervical: No cervical adenopathy.      Right cervical: No superficial, deep or posterior cervical adenopathy.     Left cervical: No superficial, deep or posterior cervical adenopathy.   Skin:     Findings: No rash.   Neurological:      Mental Status: She is alert.   Psychiatric:         Mood and Affect: Mood normal.         Behavior: Behavior normal. Behavior is cooperative.         Thought Content: Thought content normal.         Judgment: Judgment normal.         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 (CMS/HCC)    Type 1 diabetes mellitus with proliferative retinopathy (CMS/HCC) - Primary    Relevant Orders    Hemoglobin A1c    Lipid panel    Microalbumin / creatinine urine ratio    Comprehensive metabolic panel    TSH w reflex FT4    HIV 1,2 AB P24 AG    Hepatitis C Antibody With Reflex       Mental Health    Anxiety    Relevant Medications    escitalopram (LEXAPRO) 10 mg tablet    buPROPion XL (WELLBUTRIN XL) 150 mg 24 hr tablet    Current moderate episode of major depressive disorder without prior episode (CMS/HCC)    Relevant Medications    escitalopram (LEXAPRO) 10 mg tablet    buPROPion XL (WELLBUTRIN XL) 150 mg 24 hr tablet       Other    Pure hypercholesterolemia    Relevant Orders    Hemoglobin A1c    Lipid panel    Microalbumin / creatinine urine ratio    Comprehensive metabolic panel    TSH w reflex FT4    HIV 1,2 AB P24 AG    Hepatitis C Antibody With  Reflex     Other Visit Diagnoses       Sore throat        Relevant Orders    POCT rapid strep A (Completed)    COVID-19, FLU A+B AND RSV [facility] (Completed)    Encounter for screening mammogram for malignant neoplasm of breast        Relevant Orders    BI SCREENING MAMMOGRAM BILATERAL(TOMOSYNTHESIS)          Disc higher BG per endo is getting freestyle roula sensors at pharm today so she can get back on tract     Disc uri sx mild will get covid flu rsv swab ;  rapid strep neg- disc likely viral but if sx persist let me know     Had diab eye exam optho utd     Disc mood stable        There are no Patient Instructions on file for this visit.    Ruba Beavers MD

## 2025-02-27 PROBLEM — H26.9 CATARACT OF RIGHT EYE: Status: RESOLVED | Noted: 2022-09-20 | Resolved: 2025-02-27

## 2025-03-01 LAB
ALBUMIN SERPL-MCNC: 4.3 G/DL (ref 3.8–4.9)
ALP SERPL-CCNC: 100 IU/L (ref 44–121)
ALT SERPL-CCNC: 9 IU/L (ref 0–32)
AST SERPL-CCNC: 14 IU/L (ref 0–40)
BILIRUB DIRECT SERPL-MCNC: 0.28 MG/DL (ref 0–0.4)
BILIRUB SERPL-MCNC: 0.7 MG/DL (ref 0–1.2)
BUN SERPL-MCNC: 14 MG/DL (ref 6–24)
CHOLEST SERPL-MCNC: 119 MG/DL (ref 100–199)
CREAT SERPL-MCNC: 1.16 MG/DL (ref 0.57–1)
EGFRCR SERPLBLD CKD-EPI 2021: 55 ML/MIN/1.73
HDLC SERPL-MCNC: 55 MG/DL
LDLC SERPL CALC-MCNC: 47 MG/DL (ref 0–99)
PROT SERPL-MCNC: 6.5 G/DL (ref 6–8.5)
TRIGL SERPL-MCNC: 89 MG/DL (ref 0–149)
VLDLC SERPL CALC-MCNC: 17 MG/DL (ref 5–40)

## 2025-03-04 ENCOUNTER — TELEPHONE (OUTPATIENT)
Dept: CARDIOLOGY | Facility: CLINIC | Age: 55
End: 2025-03-04
Payer: COMMERCIAL

## 2025-03-04 NOTE — TELEPHONE ENCOUNTER
----- Message from Diamante Johnson sent at 3/3/2025 12:37 PM EST -----  Regarding: Lipids and LFTs  Tell her that her lipids look great and that I will see her in April  ----- Message -----  From: Interface, [facility] Lab Results In  Sent: 3/1/2025   4:05 AM EST  To: Diamante Johnson MD

## 2025-03-07 ENCOUNTER — TELEPHONE (OUTPATIENT)
Dept: PRIMARY CARE | Facility: CLINIC | Age: 55
End: 2025-03-07

## 2025-03-07 NOTE — TELEPHONE ENCOUNTER
[facility] Appointment Request   Provider: Dr. Beavers    Appointment Type: ED discharge f/u    Reason for Visit: dehydration, seizures. Pt reports ED said f/u with PCP to review medications.     Available Day and Time: any    Best Contact Number: [phone]     The practice will reach out to schedule your appointment within the next 2 business days.

## 2025-03-12 ENCOUNTER — OFFICE VISIT (OUTPATIENT)
Dept: PRIMARY CARE | Facility: CLINIC | Age: 55
End: 2025-03-12
Payer: COMMERCIAL

## 2025-03-12 VITALS
HEART RATE: 78 BPM | SYSTOLIC BLOOD PRESSURE: 124 MMHG | DIASTOLIC BLOOD PRESSURE: 68 MMHG | BODY MASS INDEX: 29.76 KG/M2 | TEMPERATURE: 98.1 F | OXYGEN SATURATION: 98 % | WEIGHT: 168 LBS | RESPIRATION RATE: 18 BRPM

## 2025-03-12 DIAGNOSIS — E10.3599 TYPE 1 DIABETES MELLITUS WITH PROLIFERATIVE RETINOPATHY, MACULAR EDEMA PRESENCE UNSPECIFIED, UNSPECIFIED LATERALITY, UNSPECIFIED PROLIFERATIVE RETINOPATHY TYPE (CMS/HCC): ICD-10-CM

## 2025-03-12 DIAGNOSIS — I21.4 NSTEMI (NON-ST ELEVATED MYOCARDIAL INFARCTION) (CMS/HCC): ICD-10-CM

## 2025-03-12 DIAGNOSIS — R56.9 SEIZURE (CMS/HCC): Primary | ICD-10-CM

## 2025-03-12 DIAGNOSIS — E10.3553 STABLE PROLIFERATIVE DIABETIC RETINOPATHY OF BOTH EYES ASSOCIATED WITH TYPE 1 DIABETES MELLITUS (CMS/HCC): ICD-10-CM

## 2025-03-12 DIAGNOSIS — F32.1 CURRENT MODERATE EPISODE OF MAJOR DEPRESSIVE DISORDER WITHOUT PRIOR EPISODE (CMS/HCC): ICD-10-CM

## 2025-03-12 PROCEDURE — 3008F BODY MASS INDEX DOCD: CPT | Performed by: FAMILY MEDICINE

## 2025-03-12 PROCEDURE — 99214 OFFICE O/P EST MOD 30 MIN: CPT | Performed by: FAMILY MEDICINE

## 2025-03-12 RX ORDER — LEVETIRACETAM 500 MG/1
500 TABLET ORAL EVERY 12 HOURS
COMMUNITY
Start: 2025-03-05 | End: 2025-04-25

## 2025-03-12 NOTE — PROGRESS NOTES
Subjective      Patient ID: Annette Montes De Oca is a 55 y.o. female.    HPI    The following have been reviewed and updated as appropriate in this visit:     Tobacco  Allergies  Meds  Problems  Med Hx  Surg Hx  Fam Hx       Pt presents for hosp followup     Patient is 55-year-old female with past medical history of hyperlipidemia, hypothyroidism, type 1 diabetes mellitus, anxiety, depression presented to hospital with complaint of nausea and vomiting for 3 days. Patient is seen her PCP a week ago for possible ear infection. Also reports having fever at that time. She is unable to keep anything down, promoted ER visit. While patient was in triage, she became unresponsive and was unable to speak for about 15-20 sec, the same episode happened in the ED room, unresponsive and unable to speak for less than 1 minute. Post episode she was confused. Stat stroke alert neurology consultation done, neurologist thinks patient had seizure episodes, recommended IV Keppra. CT of head was unremarkable.patient had a seizure in 2022 due to hypoglycemia and she was also found a blood clot in her upper extremity and she was treated with anticoagulant for 6 months.   She had had previous seizure 2022 in [place]      Provoked seizures (HCC)  Likely provoked by otitis media.  CTA head and neck and MRI of brain were unremarkable.  No neck stiffness, afebrile meningitis was unlikely therefore LP was not performed. Confusion resolved.   Evaluated by Neurology.   Evaluated by Psychiatry. Discontinue wellbutrin. Wellbutrin decreases seizure threshold.  Continue Keppra 500 mg b.i.d.   Follow up with Neurology as outpatient.    Acute otitis media  Pt with frequent left ear infections, after having sinus operation. N&V likely secondary to otitis media.  Clarithromycin has been prescribed per PCP a week ago for acute suppurative otitis media of left ear.   Received 3 days of IV ceftriaxone. Continue with 4 days of cefuroxime.    Acute metabolic  "encephalopathy  Resolved  Most likely related with postictal state    Hypokalemia  Improved     Type 1 diabetes (HCC)  Continue insulin pump at discharge.     Nausea and vomiting -resolved  Likely secondary to otitis media.   CT of abdomen and pelvis are unremarkable.   Received IV hydration.    Hyperlipidemia  Continue atorvastatin 80 mg    Anxiety and depression  Discontinue bupropion due to reduced seizure threshold. Continue with Lexapro 10 mg     Since discharge she had finished course of cefuroxime and she has started Keppra  500 bid   She has stopped bupropion  montelukast  Follow up with neuro Friday  11:30 sourav scheduled  Lab Results   Component Value Date    WBC 5.9 06/17/2024    HGB 12.4 06/17/2024    HCT 37.7 06/17/2024     06/17/2024    CHOL 119 02/28/2025    TRIG 89 02/28/2025    HDL 55 02/28/2025    ALT 9 02/28/2025    AST 14 02/28/2025     08/22/2024    K 5.1 08/22/2024     08/22/2024    CREATININE 1.16 (H) 02/28/2025    BUN 14 02/28/2025    CO2 26 08/22/2024    TSH 1.810 07/08/2022    HGBA1C 9.5 (H) 11/22/2024    MICROALBUR <3.0 12/29/2023    LDLCALC 47 02/28/2025     Review of Systems      Current Outpatient Medications:     aspirin 81 mg enteric coated tablet, Take 81 mg by mouth daily. 1/2 tablet, Disp: , Rfl:     atorvastatin (LIPITOR) 80 mg tablet, Take 1 tablet (80 mg total) by mouth daily., Disp: 90 tablet, Rfl: 3    azelastine (ASTELIN) 137 mcg (0.1 %) nasal spray, SPRAY 2 SPRAYS INTO EACH NOSTRIL TWICE A DAY AS DIRECTED, Disp: 30 mL, Rfl: 5    BD ULTRA-FINE SHORT PEN NEEDLE 31 gauge x 5/16\" needle, 1 EACH DAILY. BD ULTRA FINE, Disp: , Rfl:     blood-glucose transmitter (DEXCOM G6 TRANSMITTER) device, USE ONE TRANSMITTER WITH /SENSORS AND REPLACE EVERY 3 MONTHS, Disp: , Rfl:     carboxymethylcellulose sodium (ARTIFICIAL TEARS, CMC, OPHT), Administer into affected eye(s)., Disp: , Rfl:     dextromethorphan-guaifenesin (MUCINEX DM)  mg tablet extended " release 12 hr, Take 1 tablet by mouth every 12 (twelve) hours. Prn cough congestion ( one 1 tab q 12 hrs), Disp: 14 each, Rfl: 0    escitalopram (LEXAPRO) 10 mg tablet, Take 1 tablet (10 mg total) by mouth daily., Disp: 90 tablet, Rfl: 1    fexofenadine (ALLEGRA) 180 mg tablet, Take 1 tablet (180 mg total) by mouth daily., Disp: 30 tablet, Rfl: 5    insulin aspart (NovoLOG) 100 unit/mL patient supplied pump, Inject under the skin continuously., Disp: , Rfl:     levETIRAcetam (KEPPRA) 500 mg tablet, Take 500 mg by mouth every 12 hours., Disp: , Rfl:     levothyroxine (SYNTHROID) 75 mcg tablet, 1 TABLET ON AN EMPTY STOMACH IN THE MORNING, Disp: , Rfl: 3    pantoprazole (PROTONIX) 40 mg EC tablet, Take 1 tablet (40 mg total) by mouth daily., Disp: 90 tablet, Rfl: 1    zolpidem (AMBIEN) 5 mg tablet, TAKE 1 TABLET BY MOUTH EVERY DAY AT BEDTIME AS NEEDED FOR SLEEP, Disp: 30 tablet, Rfl: 1  Bee pollen, Codeine, Cortisone, Oxycodone, Pollen extracts, Wellbutrin [bupropion hcl], Mold, and Penicillins  Past Medical History:   Diagnosis Date    Acute deep vein thrombosis (DVT) of brachial vein of right upper extremity (CMS/HCC) 8/28/2022    Allergic     Coronary artery disease     Diabetes mellitus type I (CMS/HCC)     Disease of thyroid gland     Grand mal seizure (CMS/HCC)      Past Surgical History   Procedure Laterality Date    Carpal tunnel release      Foot surgery      Hernia repair      iFR - initial vessel N/A 6/25/2024    Performed by Lucy Grande MD at [facility] [place]    LEFT HEART CATH WITH CORONARY ANGIOGRAPHY N/A 6/25/2024    Performed by Lucy Grande MD at [facility] [place]    Mouth surgery      Oophorectomy      Sinus surgery      Tooth extraction      [place] tooth extraction       Objective     Vitals:    03/12/25 1239   BP: 124/68   BP Location: Right upper arm   Patient Position: Sitting   Pulse: 78   Resp: 18   Temp: 36.7 °C (98.1 °F)   TempSrc: Temporal   SpO2: 98%   Weight: 76.2 kg (168 lb)        Body mass index is 29.76 kg/m².    Physical Exam  Constitutional:       General: She is not in acute distress.     Appearance: Normal appearance. She is well-developed. She is not ill-appearing.   HENT:      Head: Normocephalic and atraumatic.      Right Ear: Tympanic membrane, ear canal and external ear normal.      Left Ear: Tympanic membrane, ear canal and external ear normal.   Neck:      Thyroid: No thyromegaly.   Cardiovascular:      Rate and Rhythm: Normal rate and regular rhythm.      Heart sounds: Normal heart sounds. No murmur heard.     No friction rub. No gallop.   Pulmonary:      Effort: Pulmonary effort is normal. No respiratory distress.      Breath sounds: Normal breath sounds. No wheezing or rales.   Musculoskeletal:      Cervical back: Normal range of motion and neck supple.   Lymphadenopathy:      Cervical: No cervical adenopathy.   Neurological:      Mental Status: She is alert and oriented to person, place, and time.   Psychiatric:         Mood and Affect: Mood normal.         Behavior: Behavior normal.         Thought Content: Thought content normal.         Judgment: Judgment normal.         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    Ruba Beavers MD

## 2025-03-13 PROBLEM — J06.9 UPPER RESPIRATORY TRACT INFECTION: Status: RESOLVED | Noted: 2024-09-17 | Resolved: 2025-03-13

## 2025-03-29 DIAGNOSIS — J30.2 SEASONAL ALLERGIES: ICD-10-CM

## 2025-03-31 RX ORDER — MINERAL OIL
180 ENEMA (ML) RECTAL DAILY
Qty: 30 TABLET | Refills: 5 | Status: SHIPPED | OUTPATIENT
Start: 2025-03-31

## 2025-04-02 RX ORDER — ZOLPIDEM TARTRATE 5 MG/1
5 TABLET ORAL NIGHTLY PRN
Qty: 30 TABLET | Refills: 1 | Status: SHIPPED | OUTPATIENT
Start: 2025-04-02 | End: 2025-05-29

## 2025-04-02 NOTE — TELEPHONE ENCOUNTER
"Medication zolpidem (AMBIEN) 5 mg tablet   Filled on 2.15.25  # 30  PDMP checked with no red flags     Medicine Refill Request    Last Office Visit: 3/12/2025   Last Consult Visit: 8/22/2024  Last Telemedicine Visit: 6/29/2024 Lucia Guerra CRNP    Next Appointment: 8/27/2025      Current Outpatient Medications:     aspirin 81 mg enteric coated tablet, Take 81 mg by mouth daily. 1/2 tablet, Disp: , Rfl:     atorvastatin (LIPITOR) 80 mg tablet, Take 1 tablet (80 mg total) by mouth daily., Disp: 90 tablet, Rfl: 3    azelastine (ASTELIN) 137 mcg (0.1 %) nasal spray, SPRAY 2 SPRAYS INTO EACH NOSTRIL TWICE A DAY AS DIRECTED, Disp: 30 mL, Rfl: 5    BD ULTRA-FINE SHORT PEN NEEDLE 31 gauge x 5/16\" needle, 1 EACH DAILY. BD ULTRA FINE, Disp: , Rfl:     blood-glucose transmitter (DEXCOM G6 TRANSMITTER) device, USE ONE TRANSMITTER WITH /SENSORS AND REPLACE EVERY 3 MONTHS, Disp: , Rfl:     carboxymethylcellulose sodium (ARTIFICIAL TEARS, CMC, OPHT), Administer into affected eye(s)., Disp: , Rfl:     dextromethorphan-guaifenesin (MUCINEX DM)  mg tablet extended release 12 hr, Take 1 tablet by mouth every 12 (twelve) hours. Prn cough congestion ( one 1 tab q 12 hrs), Disp: 14 each, Rfl: 0    escitalopram (LEXAPRO) 10 mg tablet, Take 1 tablet (10 mg total) by mouth daily., Disp: 90 tablet, Rfl: 1    fexofenadine (ALLEGRA) 180 mg tablet, TAKE 1 TABLET BY MOUTH EVERY DAY, Disp: 30 tablet, Rfl: 5    insulin aspart (NovoLOG) 100 unit/mL patient supplied pump, Inject under the skin continuously., Disp: , Rfl:     levETIRAcetam (KEPPRA) 500 mg tablet, Take 500 mg by mouth every 12 hours., Disp: , Rfl:     levothyroxine (SYNTHROID) 75 mcg tablet, 1 TABLET ON AN EMPTY STOMACH IN THE MORNING, Disp: , Rfl: 3    pantoprazole (PROTONIX) 40 mg EC tablet, Take 1 tablet (40 mg total) by mouth daily., Disp: 90 tablet, Rfl: 1    zolpidem (AMBIEN) 5 mg tablet, TAKE 1 TABLET BY MOUTH EVERY DAY AT BEDTIME AS NEEDED FOR SLEEP, Disp: " 30 tablet, Rfl: 1    BP Readings from Last 3 Encounters:   03/12/25 124/68   02/25/25 124/68   09/17/24 120/62       Recent Lab results:  Lab Results   Component Value Date    CHOL 119 02/28/2025   ,   Lab Results   Component Value Date    HDL 55 02/28/2025   ,   Lab Results   Component Value Date    LDLCALC 47 02/28/2025   ,   Lab Results   Component Value Date    TRIG 89 02/28/2025        Lab Results   Component Value Date    GLUCOSE 104 (H) 08/22/2024   ,   Lab Results   Component Value Date    HGBA1C 9.5 (H) 11/22/2024         Lab Results   Component Value Date    CREATININE 1.16 (H) 02/28/2025       Lab Results   Component Value Date    TSH 1.810 07/08/2022           Lab Results   Component Value Date    HGBA1C 9.5 (H) 11/22/2024

## 2025-04-25 ENCOUNTER — OFFICE VISIT (OUTPATIENT)
Dept: CARDIOLOGY | Facility: CLINIC | Age: 55
End: 2025-04-25
Payer: COMMERCIAL

## 2025-04-25 VITALS
RESPIRATION RATE: 18 BRPM | SYSTOLIC BLOOD PRESSURE: 128 MMHG | HEIGHT: 63 IN | HEART RATE: 88 BPM | DIASTOLIC BLOOD PRESSURE: 80 MMHG | OXYGEN SATURATION: 99 % | BODY MASS INDEX: 29.77 KG/M2 | WEIGHT: 168 LBS

## 2025-04-25 DIAGNOSIS — E78.00 PURE HYPERCHOLESTEROLEMIA: ICD-10-CM

## 2025-04-25 DIAGNOSIS — E10.3599 TYPE 1 DIABETES MELLITUS WITH PROLIFERATIVE RETINOPATHY, MACULAR EDEMA PRESENCE UNSPECIFIED, UNSPECIFIED LATERALITY, UNSPECIFIED PROLIFERATIVE RETINOPATHY TYPE (CMS/HCC): ICD-10-CM

## 2025-04-25 DIAGNOSIS — I21.4 NSTEMI (NON-ST ELEVATED MYOCARDIAL INFARCTION) (CMS/HCC): Primary | ICD-10-CM

## 2025-04-25 LAB
ATRIAL RATE: 82
P AXIS: 59
PR INTERVAL: 128
QRS DURATION: 82
QT INTERVAL: 334
QTC CALCULATION(BAZETT): 390
R AXIS: 11
T WAVE AXIS: 30
VENTRICULAR RATE: 82

## 2025-04-25 PROCEDURE — 99214 OFFICE O/P EST MOD 30 MIN: CPT | Performed by: INTERNAL MEDICINE

## 2025-04-25 PROCEDURE — 93000 ELECTROCARDIOGRAM COMPLETE: CPT | Performed by: INTERNAL MEDICINE

## 2025-04-25 PROCEDURE — 3008F BODY MASS INDEX DOCD: CPT | Performed by: INTERNAL MEDICINE

## 2025-04-25 NOTE — PROGRESS NOTES
"     Diamante Johnson MD       Reason for visit : Follow up  HPI   Annette Montes De Oca is a 55 y.o. female who presents to the office for cardiovascular follow up.      Dear Ruba,    On April 25, 2025 I saw Annette Montenegro in the office for her cardiovascular follow-up.  She is now 57 years old.  She has type 1 diabetes with associated retinopathy.  She has hypothyroidism, hyperlipidemia and known coronary disease.  In August 2022 she had an episode of DKA and while hospitalized was found to have a type II ST segment elevation MI.  She also developed a DVT of her upper extremity at that time.    In November 2022 she had a coronary artery CTA that showed coronary artery calcium score of 110 with nonobstructive disease.    About a year ago she had a stress echo that was nondiagnostic and a pharmacologic stress test that showed apical ischemia.    Ultimately in June 2024 she had a cardiac catheterization that showed moderate LAD disease without obstruction.    She tells me that since I have seen her she has no cardiovascular symptoms.  She denies chest pain, shortness of breath, lightheadedness, palpitations, or syncope.  She is not terribly active, but can do a flight or 2 of stairs without difficulty.  She was hospitalized in March 2025 for UTI and apparently had some type of \"stress seizures\" secondary to drug interactions.  She was on Keppra for short period of time and this has been discontinued.    Her most recent cholesterol done in March 2025 was 119 with an LDL of 47.  She had normal liver function studies.    I have reviewed all of her medications.  Aside from the above-mentioned there are no new medical or surgical issues.  The rest of her review of systems is negative       Problem List:  2024-09: Upper respiratory tract infection  2024-09: Acute serous otitis media of left ear  2024-06: Gastroesophageal reflux disease without esophagitis  2024-06: Abnormal stress ECG  2024-04: Allergic rhinitis  2024-04: " Chronic cough  2024-03: Cold virus  2024-01: Type 1 diabetes mellitus with proliferative retinopathy (CMS/  HCC)  2023-10: Acute cystitis without hematuria  2023-05: Urinary frequency  2023-05: Upper respiratory tract infection  2023-04: Current moderate episode of major depressive disorder   without prior episode (CMS/HCC)  2023-02: Left acute otitis media  2022-11: Cellulitis of abdominal wall  2022-10: Acute non-recurrent maxillary sinusitis  2022-10: Pure hypercholesterolemia  2022-09: Family history of cerebrovascular accident (CVA)  2022-09: Cataract of right eye  2022-08: Acute deep vein thrombosis (DVT) of brachial vein of right   upper extremity (CMS/HCC)  2022-08: NSTEMI (non-ST elevated myocardial infarction) (CMS/HCC)  2022-08: Diabetic ketoacidosis without coma associated with type 1   diabetes mellitus (CMS/HCC)  2022-08: Syncope  2022-07: Right acute otitis media  2021-04: Type 1 diabetes mellitus with hyperglycemia (CMS/HCC)  2021-04: Stable proliferative diabetic retinopathy of both eyes   associated with type 1 diabetes mellitus (CMS/HCC)  2020-10: Seasonal allergies  2019-07: Dysuria  2019-01: Paronychia of toe of left foot  2018-09: Abdominal wall abscess  2015-05: Anxiety  2015-05: Hypothyroid  2015-05: Iron deficiency anemia  2015-05: Type 1 diabetes mellitus (CMS/HCC)           Current Medications[1]       Allergies:  Bee pollen, Codeine, Cortisone, Oxycodone, Pollen extracts, Wellbutrin [bupropion hcl], Mold, and Penicillins    Surgical History:  Past Surgical History   Procedure Laterality Date    Carpal tunnel release      Foot surgery      Hernia repair      iFR - initial vessel N/A 6/25/2024    Performed by Lucy Grande MD at [facility] [place] CARDIAC CATH/EP    LEFT HEART CATH WITH CORONARY ANGIOGRAPHY N/A 6/25/2024    Performed by Lucy Grande MD at [facility] [place] CARDIAC CATH/EP    Mouth surgery      Oophorectomy      Sinus surgery      Tooth extraction      [place] tooth extraction          Family  "History:  Family History   Problem Relation Name Age of Onset    Stroke Biological Brother      Heart disease Maternal Grandmother      Kidney disease Maternal Grandmother      Prostate cancer Paternal Grandmother          Social History:  Social History     Socioeconomic History    Marital status:      Spouse name: None    Number of children: None    Years of education: None    Highest education level: None   Tobacco Use    Smoking status: Never    Smokeless tobacco: Never   Vaping Use    Vaping status: Never Used   Substance and Sexual Activity    Alcohol use: No    Drug use: No    Sexual activity: Not Currently   Social History Narrative    [employer]           Review of Systems  The rest of her review of systems is negative          Objective   Vitals:    04/25/25 1335   BP: 128/80   BP Location: Left upper arm   Patient Position: Sitting   Pulse: 88   Resp: 18   SpO2: 99%   Weight: 76.2 kg (168 lb)   Height: 1.6 m (5' 3\")     Physical Exam  Blood pressure is 128/80.  Heart rate is 88 and regular.  She is comfortable.  Skin is warm and dry.  Conjunctiva are pink.  Affect is appropriate.  No JVD or HJR.  Carotids are unremarkable.  Chest is clear.  Regular rhythm.  Normal S1 and S2.  No obvious murmurs or gallops.  Abdomen is soft with good bowel sounds.  No organomegaly.  No clubbing, cyanosis, or edema.    Good peripheral pulses.  No rash  No obvious musculoskeletal deformities         Labs:   Lab Results   Component Value Date    WBC 5.9 06/17/2024    HGB 12.4 06/17/2024    HCT 37.7 06/17/2024     06/17/2024    CHOL 119 02/28/2025    TRIG 89 02/28/2025    HDL 55 02/28/2025    LDLCALC 47 02/28/2025    ALT 9 02/28/2025    AST 14 02/28/2025     08/22/2024    K 5.1 08/22/2024     08/22/2024    CREATININE 1.16 (H) 02/28/2025    BUN 14 02/28/2025    CO2 26 08/22/2024    TSH 1.810 07/08/2022    HGBA1C 9.5 (H) 11/22/2024       ECG :  Her EKG shows nonspecific T wave abnormalities that are " "unchanged             Problem List Items Addressed This Visit       NSTEMI (non-ST elevated myocardial infarction) (CMS/[facility]) - Primary    Relevant Orders    [facility] MUSE ECG 12 lead (clinic performed) (Completed)    CV Nuclear PET/CT myocardial perfusion    BUN    Creatinine, serum    Pure hypercholesterolemia    Relevant Orders    [facility] MUSE ECG 12 lead (clinic performed) (Completed)    Hepatic function panel    Lipid panel    Type 1 diabetes mellitus with proliferative retinopathy (CMS/[facility])    Relevant Orders    [facility] MUSE ECG 12 lead (clinic performed) (Completed)       Impression:    Annette is cardiovascularly stable.  She has no angina.  Her last catheterization done in June 2024 did not show any obstructive coronary disease.    Her last lipids done in March were excellent with an LDL of 47.    I have encouraged her to increase her level of activity if she can.  I will see her again in a year with a PET scan prior since neither stress testing or coronary artery CTA has been reliable.  Lipids and LFTs will be done at that time.  I will be happy to see her at any time if she should have symptoms    I thank you for allowing me to participate in the care of your patient.  If I can furnish you with any further details, please do not hesitate to contact me.     Diamante Johnson MD  4/26/2025    This document was generated utilizing voice recognition technology. A reasonable attempt at proofreading has been made to minimize errors but please excuse any typographical errors which may be present. Please call with any questions.       [1]   Current Outpatient Medications   Medication Sig    aspirin 81 mg enteric coated tablet Take 81 mg by mouth daily. 1/2 tablet    atorvastatin (LIPITOR) 80 mg tablet Take 1 tablet (80 mg total) by mouth daily.    azelastine (ASTELIN) 137 mcg (0.1 %) nasal spray SPRAY 2 SPRAYS INTO EACH NOSTRIL TWICE A DAY AS DIRECTED    BD ULTRA-FINE SHORT PEN NEEDLE 31 gauge x 5/16\" needle 1 " EACH DAILY. BD ULTRA FINE    blood-glucose transmitter (DEXCOM G6 TRANSMITTER) device USE ONE TRANSMITTER WITH /SENSORS AND REPLACE EVERY 3 MONTHS    carboxymethylcellulose sodium (ARTIFICIAL TEARS, CMC, OPHT) Administer into affected eye(s).    dextromethorphan-guaifenesin (MUCINEX DM)  mg tablet extended release 12 hr Take 1 tablet by mouth every 12 (twelve) hours. Prn cough congestion ( one 1 tab q 12 hrs)    escitalopram (LEXAPRO) 10 mg tablet Take 1 tablet (10 mg total) by mouth daily.    fexofenadine (ALLEGRA) 180 mg tablet TAKE 1 TABLET BY MOUTH EVERY DAY    insulin aspart (NovoLOG) 100 unit/mL patient supplied pump Inject under the skin continuously.    levothyroxine (SYNTHROID) 75 mcg tablet 1 TABLET ON AN EMPTY STOMACH IN THE MORNING    pantoprazole (PROTONIX) 40 mg EC tablet Take 1 tablet (40 mg total) by mouth daily.    zolpidem (AMBIEN) 5 mg tablet Take 1 tablet (5 mg total) by mouth nightly as needed for sleep. for sleep     No current facility-administered medications for this visit.

## 2025-04-25 NOTE — LETTER
"April 26, 2025     Ruba Beavers MD  [address]  [address]  [facility] [address]    Patient: Annette Montes De Oca  YOB: 1970  Date of Visit: 4/25/2025      Dear Dr. Beavers:    Thank you for referring Annette Montes De Oca to me for evaluation. Below are my notes for this consultation.    If you have questions, please do not hesitate to call me. I look forward to following your patient along with you.         Sincerely,        Diamante Johnson MD        CC: No Recipients    Diamante Johnson MD  4/26/2025  8:12 AM  Sign when Signing Visit       Diamante Johnson MD       Reason for visit : Follow up  HPI  Annette Montes De Oca is a 55 y.o. female who presents to the office for cardiovascular follow up.      Dear Ruba,    On April 25, 2025 I saw Annette Montenegro in the office for her cardiovascular follow-up.  She is now 57 years old.  She has type 1 diabetes with associated retinopathy.  She has hypothyroidism, hyperlipidemia and known coronary disease.  In August 2022 she had an episode of DKA and while hospitalized was found to have a type II ST segment elevation MI.  She also developed a DVT of her upper extremity at that time.    In November 2022 she had a coronary artery CTA that showed coronary artery calcium score of 110 with nonobstructive disease.    About a year ago she had a stress echo that was nondiagnostic and a pharmacologic stress test that showed apical ischemia.    Ultimately in June 2024 she had a cardiac catheterization that showed moderate LAD disease without obstruction.    She tells me that since I have seen her she has no cardiovascular symptoms.  She denies chest pain, shortness of breath, lightheadedness, palpitations, or syncope.  She is not terribly active, but can do a flight or 2 of stairs without difficulty.  She was hospitalized in March 2025 for UTI and apparently had some type of \"stress seizures\" secondary to drug interactions.  She was on Keppra for short period " of time and this has been discontinued.    Her most recent cholesterol done in March 2025 was 119 with an LDL of 47.  She had normal liver function studies.    I have reviewed all of her medications.  Aside from the above-mentioned there are no new medical or surgical issues.  The rest of her review of systems is negative       Problem List:  2024-09: Upper respiratory tract infection  2024-09: Acute serous otitis media of left ear  2024-06: Gastroesophageal reflux disease without esophagitis  2024-06: Abnormal stress ECG  2024-04: Allergic rhinitis  2024-04: Chronic cough  2024-03: Cold virus  2024-01: Type 1 diabetes mellitus with proliferative retinopathy (CMS/  [facility])  2023-10: Acute cystitis without hematuria  2023-05: Urinary frequency  2023-05: Upper respiratory tract infection  2023-04: Current moderate episode of major depressive disorder   without prior episode (CMS/[facility])  2023-02: Left acute otitis media  2022-11: Cellulitis of abdominal wall  2022-10: Acute non-recurrent maxillary sinusitis  2022-10: Pure hypercholesterolemia  2022-09: Family history of cerebrovascular accident (CVA)  2022-09: Cataract of right eye  2022-08: Acute deep vein thrombosis (DVT) of brachial vein of right   upper extremity (CMS/[facility])  2022-08: NSTEMI (non-ST elevated myocardial infarction) (CMS/[facility])  2022-08: Diabetic ketoacidosis without coma associated with type 1   diabetes mellitus (CMS/[facility])  2022-08: Syncope  2022-07: Right acute otitis media  2021-04: Type 1 diabetes mellitus with hyperglycemia (CMS/[facility])  2021-04: Stable proliferative diabetic retinopathy of both eyes   associated with type 1 diabetes mellitus (CMS/[facility])  2020-10: Seasonal allergies  2019-07: Dysuria  2019-01: Paronychia of toe of left foot  2018-09: Abdominal wall abscess  2015-05: Anxiety  2015-05: Hypothyroid  2015-05: Iron deficiency anemia  2015-05: Type 1 diabetes mellitus (CMS/[facility])           Current Medications[1]       Allergies:  Bee pollen, Codeine,  "Cortisone, Oxycodone, Pollen extracts, Wellbutrin [bupropion hcl], Mold, and Penicillins    Surgical History:  Past Surgical History   Procedure Laterality Date    Carpal tunnel release      Foot surgery      Hernia repair      iFR - initial vessel N/A 6/25/2024    Performed by Lucy [facility] MD at [facility] [place] [facility]ARDIAC CATH/EP    LEFT HEART CATH WITH CORONARY ANGIOGRAPHY N/A 6/25/2024    Performed by Lucy Grande MD at [facility] [place] [facility]ARDIAC CATH/EP    Mouth surgery      Oophorectomy      Sinus surgery      Tooth extraction      [place] tooth extraction          Family History:  Family History   Problem Relation Name Age of Onset    Stroke Biological Brother      Heart disease Maternal Grandmother      Kidney disease Maternal Grandmother      Prostate cancer Paternal Grandmother          Social History:  Social History     Socioeconomic History    Marital status:      Spouse name: None    Number of children: None    Years of education: None    Highest education level: None   Tobacco Use    Smoking status: Never    Smokeless tobacco: Never   Vaping Use    Vaping status: Never Used   Substance and Sexual Activity    Alcohol use: No    Drug use: No    Sexual activity: Not Currently   Social History Narrative    [employer]           Review of Systems  The rest of her review of systems is negative          Objective  Vitals:    04/25/25 1335   BP: 128/80   BP Location: Left upper arm   Patient Position: Sitting   Pulse: 88   Resp: 18   SpO2: 99%   Weight: 76.2 kg (168 lb)   Height: 1.6 m (5' 3\")     Physical Exam  Blood pressure is 128/80.  Heart rate is 88 and regular.  She is comfortable.  Skin is warm and dry.  Conjunctiva are pink.  Affect is appropriate.  No JVD or HJR.  Carotids are unremarkable.  Chest is clear.  Regular rhythm.  Normal S1 and S2.  No obvious murmurs or gallops.  Abdomen is soft with good bowel sounds.  No organomegaly.  No clubbing, cyanosis, or edema.    Good peripheral " pulses.  No rash  No obvious musculoskeletal deformities         Labs:   Lab Results   Component Value Date    WBC 5.9 06/17/2024    HGB 12.4 06/17/2024    HCT 37.7 06/17/2024     06/17/2024    CHOL 119 02/28/2025    TRIG 89 02/28/2025    HDL 55 02/28/2025    LDLCALC 47 02/28/2025    ALT 9 02/28/2025    AST 14 02/28/2025     08/22/2024    K 5.1 08/22/2024     08/22/2024    CREATININE 1.16 (H) 02/28/2025    BUN 14 02/28/2025    CO2 26 08/22/2024    TSH 1.810 07/08/2022    HGBA1C 9.5 (H) 11/22/2024       ECG :  Her EKG shows nonspecific T wave abnormalities that are unchanged             Problem List Items Addressed This Visit       NSTEMI (non-ST elevated myocardial infarction) (CMS/[facility]) - Primary    Relevant Orders    [facility] MUSE ECG 12 lead (clinic performed) (Completed)    CV Nuclear PET/CT myocardial perfusion    BUN    Creatinine, serum    Pure hypercholesterolemia    Relevant Orders    [facility] MUSE ECG 12 lead (clinic performed) (Completed)    Hepatic function panel    Lipid panel    Type 1 diabetes mellitus with proliferative retinopathy (CMS/[facility])    Relevant Orders    [facility] MUSE ECG 12 lead (clinic performed) (Completed)       Impression:    Annette is cardiovascularly stable.  She has no angina.  Her last catheterization done in June 2024 did not show any obstructive coronary disease.    Her last lipids done in March were excellent with an LDL of 47.    I have encouraged her to increase her level of activity if she can.  I will see her again in a year with a PET scan prior since neither stress testing or coronary artery CTA has been reliable.  Lipids and LFTs will be done at that time.  I will be happy to see her at any time if she should have symptoms    I thank you for allowing me to participate in the care of your patient.  If I can furnish you with any further details, please do not hesitate to contact me.     Diamante Johnson MD  4/26/2025    This document was generated  "utilizing voice recognition technology. A reasonable attempt at proofreading has been made to minimize errors but please excuse any typographical errors which may be present. Please call with any questions.         [1]  Current Outpatient Medications   Medication Sig    aspirin 81 mg enteric coated tablet Take 81 mg by mouth daily. 1/2 tablet    atorvastatin (LIPITOR) 80 mg tablet Take 1 tablet (80 mg total) by mouth daily.    azelastine (ASTELIN) 137 mcg (0.1 %) nasal spray SPRAY 2 SPRAYS INTO EACH NOSTRIL TWICE A DAY AS DIRECTED    BD ULTRA-FINE SHORT PEN NEEDLE 31 gauge x 5/16\" needle 1 EACH DAILY. BD ULTRA FINE    blood-glucose transmitter (DEXCOM G6 TRANSMITTER) device USE ONE TRANSMITTER WITH /SENSORS AND REPLACE EVERY 3 MONTHS    carboxymethylcellulose sodium (ARTIFICIAL TEARS, CMC, OPHT) Administer into affected eye(s).    dextromethorphan-guaifenesin (MUCINEX DM)  mg tablet extended release 12 hr Take 1 tablet by mouth every 12 (twelve) hours. Prn cough congestion ( one 1 tab q 12 hrs)    escitalopram (LEXAPRO) 10 mg tablet Take 1 tablet (10 mg total) by mouth daily.    fexofenadine (ALLEGRA) 180 mg tablet TAKE 1 TABLET BY MOUTH EVERY DAY    insulin aspart (NovoLOG) 100 unit/mL patient supplied pump Inject under the skin continuously.    levothyroxine (SYNTHROID) 75 mcg tablet 1 TABLET ON AN EMPTY STOMACH IN THE MORNING    pantoprazole (PROTONIX) 40 mg EC tablet Take 1 tablet (40 mg total) by mouth daily.    zolpidem (AMBIEN) 5 mg tablet Take 1 tablet (5 mg total) by mouth nightly as needed for sleep. for sleep     No current facility-administered medications for this visit.   "

## 2025-05-02 ENCOUNTER — OFFICE VISIT (OUTPATIENT)
Dept: PRIMARY CARE | Facility: CLINIC | Age: 55
End: 2025-05-02
Payer: COMMERCIAL

## 2025-05-02 VITALS
WEIGHT: 168.6 LBS | TEMPERATURE: 97.9 F | HEART RATE: 100 BPM | DIASTOLIC BLOOD PRESSURE: 64 MMHG | BODY MASS INDEX: 29.88 KG/M2 | OXYGEN SATURATION: 98 % | HEIGHT: 63 IN | RESPIRATION RATE: 12 BRPM | SYSTOLIC BLOOD PRESSURE: 108 MMHG

## 2025-05-02 DIAGNOSIS — F32.1 CURRENT MODERATE EPISODE OF MAJOR DEPRESSIVE DISORDER WITHOUT PRIOR EPISODE (CMS/HCC): ICD-10-CM

## 2025-05-02 DIAGNOSIS — Z79.4 TYPE 2 DIABETES MELLITUS WITH HYPERGLYCEMIA, WITH LONG-TERM CURRENT USE OF INSULIN (CMS/HCC): ICD-10-CM

## 2025-05-02 DIAGNOSIS — E11.65 TYPE 2 DIABETES MELLITUS WITH HYPERGLYCEMIA, WITH LONG-TERM CURRENT USE OF INSULIN (CMS/HCC): ICD-10-CM

## 2025-05-02 DIAGNOSIS — I21.4 NSTEMI (NON-ST ELEVATED MYOCARDIAL INFARCTION) (CMS/HCC): ICD-10-CM

## 2025-05-02 DIAGNOSIS — J30.2 SEASONAL ALLERGIES: Primary | ICD-10-CM

## 2025-05-02 DIAGNOSIS — E10.3553 STABLE PROLIFERATIVE DIABETIC RETINOPATHY OF BOTH EYES ASSOCIATED WITH TYPE 1 DIABETES MELLITUS (CMS/HCC): ICD-10-CM

## 2025-05-03 LAB
ALBUMIN SERPL-MCNC: 4.1 G/DL (ref 3.8–4.9)
ALBUMIN/CREAT UR: <3 MG/G CREAT (ref 0–29)
ALP SERPL-CCNC: 94 IU/L (ref 44–121)
ALT SERPL-CCNC: 17 IU/L (ref 0–32)
AST SERPL-CCNC: 20 IU/L (ref 0–40)
BILIRUB SERPL-MCNC: 0.4 MG/DL (ref 0–1.2)
BUN SERPL-MCNC: 15 MG/DL (ref 6–24)
BUN/CREAT SERPL: 16 (ref 9–23)
CALCIUM SERPL-MCNC: 9.7 MG/DL (ref 8.7–10.2)
CHLORIDE SERPL-SCNC: 103 MMOL/L (ref 96–106)
CHOLEST SERPL-MCNC: 123 MG/DL (ref 100–199)
CO2 SERPL-SCNC: 20 MMOL/L (ref 20–29)
CREAT SERPL-MCNC: 0.95 MG/DL (ref 0.57–1)
CREAT UR-MCNC: 104.5 MG/DL
EGFRCR SERPLBLD CKD-EPI 2021: 70 ML/MIN/1.73
GLOBULIN SER CALC-MCNC: 2 G/DL (ref 1.5–4.5)
GLUCOSE SERPL-MCNC: 230 MG/DL (ref 70–99)
HBA1C MFR BLD: 8.2 % (ref 4.8–5.6)
HCV AB SERPL QL IA: NON REACTIVE
HCV AB SERPL QL IA: NORMAL
HDLC SERPL-MCNC: 43 MG/DL
HIV 1+2 AB+HIV1 P24 AG SERPL QL IA: NON REACTIVE
LDLC SERPL CALC-MCNC: 68 MG/DL (ref 0–99)
MICROALBUMIN UR-MCNC: <3 UG/ML
POTASSIUM SERPL-SCNC: 4.7 MMOL/L (ref 3.5–5.2)
PROT SERPL-MCNC: 6.1 G/DL (ref 6–8.5)
SODIUM SERPL-SCNC: 138 MMOL/L (ref 134–144)
T4 FREE SERPL-MCNC: 1.72 NG/DL (ref 0.82–1.77)
TRIGL SERPL-MCNC: 56 MG/DL (ref 0–149)
TSH SERPL DL<=0.005 MIU/L-ACNC: 0.85 UIU/ML (ref 0.45–4.5)
VLDLC SERPL CALC-MCNC: 12 MG/DL (ref 5–40)

## 2025-05-04 ENCOUNTER — RESULTS FOLLOW-UP (OUTPATIENT)
Dept: PRIMARY CARE | Facility: CLINIC | Age: 55
End: 2025-05-04

## 2025-05-05 ENCOUNTER — TELEPHONE (OUTPATIENT)
Dept: PRIMARY CARE | Facility: CLINIC | Age: 55
End: 2025-05-05
Payer: COMMERCIAL

## 2025-05-05 RX ORDER — DOXYCYCLINE HYCLATE 100 MG
100 TABLET ORAL 2 TIMES DAILY
Qty: 10 TABLET | Refills: 0 | Status: SHIPPED | OUTPATIENT
Start: 2025-05-05 | End: 2025-05-10

## 2025-05-29 RX ORDER — ZOLPIDEM TARTRATE 5 MG/1
5 TABLET ORAL NIGHTLY PRN
Qty: 30 TABLET | Refills: 1 | Status: SHIPPED | OUTPATIENT
Start: 2025-05-29

## 2025-06-18 ENCOUNTER — TELEPHONE (OUTPATIENT)
Dept: OBGYN CLINIC | Facility: CLINIC | Age: 55
End: 2025-06-18

## 2025-07-11 LAB — MLHC DIABETIC EYE EXAM (EXTERNAL): NORMAL

## 2025-08-02 DIAGNOSIS — F41.9 ANXIETY: ICD-10-CM

## 2025-08-04 RX ORDER — ESCITALOPRAM OXALATE 10 MG/1
10 TABLET ORAL DAILY
Qty: 30 TABLET | Refills: 5 | Status: SHIPPED | OUTPATIENT
Start: 2025-08-04

## 2025-08-26 ENCOUNTER — TELEPHONE (OUTPATIENT)
Dept: PRIMARY CARE | Facility: CLINIC | Age: 55
End: 2025-08-26
Payer: COMMERCIAL

## 2025-08-27 ENCOUNTER — OFFICE VISIT (OUTPATIENT)
Dept: PRIMARY CARE | Facility: CLINIC | Age: 55
End: 2025-08-27
Payer: COMMERCIAL

## 2025-08-27 VITALS
WEIGHT: 174.4 LBS | SYSTOLIC BLOOD PRESSURE: 122 MMHG | TEMPERATURE: 97.9 F | BODY MASS INDEX: 30.89 KG/M2 | HEART RATE: 82 BPM | OXYGEN SATURATION: 97 % | DIASTOLIC BLOOD PRESSURE: 68 MMHG | RESPIRATION RATE: 18 BRPM

## 2025-08-27 DIAGNOSIS — F32.1 CURRENT MODERATE EPISODE OF MAJOR DEPRESSIVE DISORDER WITHOUT PRIOR EPISODE (CMS/HCC): ICD-10-CM

## 2025-08-27 DIAGNOSIS — R30.0 DYSURIA: Primary | ICD-10-CM

## 2025-08-27 DIAGNOSIS — I21.4 NSTEMI (NON-ST ELEVATED MYOCARDIAL INFARCTION) (CMS/HCC): ICD-10-CM

## 2025-08-27 DIAGNOSIS — E03.9 ACQUIRED HYPOTHYROIDISM: ICD-10-CM

## 2025-08-27 DIAGNOSIS — Z79.4 TYPE 2 DIABETES MELLITUS WITH HYPERGLYCEMIA, WITH LONG-TERM CURRENT USE OF INSULIN (CMS/HCC): ICD-10-CM

## 2025-08-27 DIAGNOSIS — E10.3553 STABLE PROLIFERATIVE DIABETIC RETINOPATHY OF BOTH EYES ASSOCIATED WITH TYPE 1 DIABETES MELLITUS (CMS/HCC): ICD-10-CM

## 2025-08-27 DIAGNOSIS — E11.65 TYPE 2 DIABETES MELLITUS WITH HYPERGLYCEMIA, WITH LONG-TERM CURRENT USE OF INSULIN (CMS/HCC): ICD-10-CM

## 2025-08-27 DIAGNOSIS — E78.00 PURE HYPERCHOLESTEROLEMIA: ICD-10-CM

## 2025-08-27 LAB
BILIRUBIN, POC: NEGATIVE
BLOOD URINE, POC: POSITIVE
CLARITY, POC: CLEAR
COLOR, POC: YELLOW
EXPIRATION DATE: ABNORMAL
GLUCOSE URINE, POC: ABNORMAL
KETONES, POC: NEGATIVE
LEUKOCYTE EST, POC: NEGATIVE
Lab: ABNORMAL
NITRITE, POC: NEGATIVE
PH, POC: 6
POCT MANUFACTURER: ABNORMAL
PROTEIN, POC: NEGATIVE
SPECIFIC GRAVITY, POC: 1
UROBILINOGEN, POC: 0.2

## 2025-08-27 PROCEDURE — 3008F BODY MASS INDEX DOCD: CPT | Performed by: FAMILY MEDICINE

## 2025-08-27 PROCEDURE — 81002 URINALYSIS NONAUTO W/O SCOPE: CPT | Performed by: FAMILY MEDICINE

## 2025-08-27 PROCEDURE — 99214 OFFICE O/P EST MOD 30 MIN: CPT | Performed by: FAMILY MEDICINE

## 2025-08-27 RX ORDER — CIPROFLOXACIN 500 MG/1
500 TABLET, FILM COATED ORAL 2 TIMES DAILY
Qty: 10 TABLET | Refills: 0 | Status: SHIPPED | OUTPATIENT
Start: 2025-08-27 | End: 2025-09-01

## 2025-08-27 RX ORDER — CETIRIZINE HYDROCHLORIDE 10 MG/1
10 TABLET ORAL DAILY
COMMUNITY

## 2025-08-27 RX ORDER — FLUTICASONE PROPIONATE 50 MCG
1 SPRAY, SUSPENSION (ML) NASAL DAILY
COMMUNITY

## 2025-08-27 RX ORDER — MELOXICAM 7.5 MG/1
7.5 TABLET ORAL DAILY
COMMUNITY
Start: 2025-08-18

## 2025-08-27 ASSESSMENT — PATIENT HEALTH QUESTIONNAIRE - PHQ9: SUM OF ALL RESPONSES TO PHQ9 QUESTIONS 1 & 2: 0

## 2025-08-28 PROBLEM — E10.3599: Status: RESOLVED | Noted: 2024-01-02 | Resolved: 2025-08-28

## 2025-08-29 LAB
BACTERIA UR CULT: NORMAL
BACTERIA UR CULT: NORMAL

## 2025-09-01 LAB
BACTERIA UR CULT: NORMAL
BACTERIA UR CULT: NORMAL

## (undated) DEVICE — CATH INTROCAN SAFETY FEP 20G X 1IN

## (undated) DEVICE — GUIDEWIRE DIAGNOSTIC J .035. 150 (ORDER IN 5'S)

## (undated) DEVICE — KIT ACIST MULTIUSE SYRINGE

## (undated) DEVICE — GLIDESHEATH SLENDER SS (.021) 6FR 10CM

## (undated) DEVICE — CATH D 5F IMPULSE FL3.5 (5PK)

## (undated) DEVICE — CATH LAUNCHER GUIDE EBU 3.0/6FR

## (undated) DEVICE — ***USE 121412***PACK DEVICE IMPLANT TLH

## (undated) DEVICE — GUIDEWIRE PRESSURE OMNI WIRE JTIP 185CM

## (undated) DEVICE — CATH D 5F IMPULSE FR4 125CM (5PK)

## (undated) DEVICE — KIT CATH LAB ANGIO

## (undated) DEVICE — TR BAND REGULAR

## (undated) DEVICE — GUIDEWIRE DIAGNOSTIC 035-260 EXCHANGE

## (undated) DEVICE — SYRINGE INFLATION W/ MAP152